# Patient Record
Sex: MALE | Race: BLACK OR AFRICAN AMERICAN | NOT HISPANIC OR LATINO | Employment: OTHER | URBAN - METROPOLITAN AREA
[De-identification: names, ages, dates, MRNs, and addresses within clinical notes are randomized per-mention and may not be internally consistent; named-entity substitution may affect disease eponyms.]

---

## 2023-11-25 ENCOUNTER — HOSPITAL ENCOUNTER (INPATIENT)
Facility: HOSPITAL | Age: 48
LOS: 3 days | Discharge: HOME | DRG: 177 | End: 2023-11-28
Attending: EMERGENCY MEDICINE | Admitting: STUDENT IN AN ORGANIZED HEALTH CARE EDUCATION/TRAINING PROGRAM
Payer: MEDICARE

## 2023-11-25 ENCOUNTER — APPOINTMENT (OUTPATIENT)
Dept: RADIOLOGY | Facility: HOSPITAL | Age: 48
DRG: 177 | End: 2023-11-25
Payer: MEDICARE

## 2023-11-25 ENCOUNTER — DOCUMENTATION (OUTPATIENT)
Dept: INTENSIVE CARE | Facility: HOSPITAL | Age: 48
End: 2023-11-25

## 2023-11-25 DIAGNOSIS — I50.33 ACUTE ON CHRONIC DIASTOLIC CONGESTIVE HEART FAILURE (MULTI): ICD-10-CM

## 2023-11-25 DIAGNOSIS — U07.1 COVID: Primary | ICD-10-CM

## 2023-11-25 DIAGNOSIS — J44.9 CHRONIC OBSTRUCTIVE PULMONARY DISEASE, UNSPECIFIED COPD TYPE (MULTI): ICD-10-CM

## 2023-11-25 PROBLEM — J45.909 UNSPECIFIED ASTHMA, UNCOMPLICATED (HHS-HCC): Status: ACTIVE | Noted: 2023-07-19

## 2023-11-25 PROBLEM — I50.9 CONGESTIVE HEART FAILURE (MULTI): Status: ACTIVE | Noted: 2023-07-12

## 2023-11-25 LAB
ANION GAP BLDV CALCULATED.4IONS-SCNC: -1 MMOL/L (ref 10–25)
ANION GAP BLDV CALCULATED.4IONS-SCNC: 2 MMOL/L (ref 10–25)
ANION GAP BLDV CALCULATED.4IONS-SCNC: 3 MMOL/L (ref 10–25)
ANION GAP BLDV CALCULATED.4IONS-SCNC: 5 MMOL/L (ref 10–25)
ANION GAP SERPL CALC-SCNC: 12 MMOL/L (ref 10–20)
BASE EXCESS BLDV CALC-SCNC: 10.1 MMOL/L (ref -2–3)
BASE EXCESS BLDV CALC-SCNC: 7.5 MMOL/L (ref -2–3)
BASE EXCESS BLDV CALC-SCNC: 9.2 MMOL/L (ref -2–3)
BASE EXCESS BLDV CALC-SCNC: 9.7 MMOL/L (ref -2–3)
BASOPHILS # BLD AUTO: 0.03 X10*3/UL (ref 0–0.1)
BASOPHILS NFR BLD AUTO: 0.5 %
BNP SERPL-MCNC: 59 PG/ML (ref 0–99)
BODY TEMPERATURE: 37 DEGREES CELSIUS
BUN SERPL-MCNC: 17 MG/DL (ref 6–23)
CA-I BLDV-SCNC: 1.02 MMOL/L (ref 1.1–1.33)
CA-I BLDV-SCNC: 1.05 MMOL/L (ref 1.1–1.33)
CA-I BLDV-SCNC: 1.07 MMOL/L (ref 1.1–1.33)
CA-I BLDV-SCNC: 1.11 MMOL/L (ref 1.1–1.33)
CALCIUM SERPL-MCNC: 8.4 MG/DL (ref 8.6–10.6)
CARDIAC TROPONIN I PNL SERPL HS: 39 NG/L (ref 0–53)
CARDIAC TROPONIN I PNL SERPL HS: 47 NG/L (ref 0–53)
CHLORIDE BLDV-SCNC: 95 MMOL/L (ref 98–107)
CHLORIDE BLDV-SCNC: 95 MMOL/L (ref 98–107)
CHLORIDE BLDV-SCNC: 96 MMOL/L (ref 98–107)
CHLORIDE BLDV-SCNC: 97 MMOL/L (ref 98–107)
CHLORIDE SERPL-SCNC: 96 MMOL/L (ref 98–107)
CO2 SERPL-SCNC: 35 MMOL/L (ref 21–32)
CREAT SERPL-MCNC: 1.15 MG/DL (ref 0.5–1.3)
EOSINOPHIL # BLD AUTO: 0.14 X10*3/UL (ref 0–0.7)
EOSINOPHIL NFR BLD AUTO: 2.4 %
ERYTHROCYTE [DISTWIDTH] IN BLOOD BY AUTOMATED COUNT: 12.6 % (ref 11.5–14.5)
FLUAV RNA RESP QL NAA+PROBE: NOT DETECTED
FLUBV RNA RESP QL NAA+PROBE: NOT DETECTED
GFR SERPL CREATININE-BSD FRML MDRD: 79 ML/MIN/1.73M*2
GLUCOSE BLDV-MCNC: 192 MG/DL (ref 74–99)
GLUCOSE BLDV-MCNC: 205 MG/DL (ref 74–99)
GLUCOSE BLDV-MCNC: 219 MG/DL (ref 74–99)
GLUCOSE BLDV-MCNC: 231 MG/DL (ref 74–99)
GLUCOSE SERPL-MCNC: 183 MG/DL (ref 74–99)
HCO3 BLDV-SCNC: 39.5 MMOL/L (ref 22–26)
HCO3 BLDV-SCNC: 40.1 MMOL/L (ref 22–26)
HCO3 BLDV-SCNC: 41.3 MMOL/L (ref 22–26)
HCO3 BLDV-SCNC: 42.5 MMOL/L (ref 22–26)
HCT VFR BLD AUTO: 51.5 % (ref 41–52)
HCT VFR BLD EST: 50 % (ref 41–52)
HCT VFR BLD EST: 51 % (ref 41–52)
HCT VFR BLD EST: 53 % (ref 41–52)
HCT VFR BLD EST: 53 % (ref 41–52)
HGB BLD-MCNC: 16.2 G/DL (ref 13.5–17.5)
HGB BLDV-MCNC: 16.6 G/DL (ref 13.5–17.5)
HGB BLDV-MCNC: 17.1 G/DL (ref 13.5–17.5)
HGB BLDV-MCNC: 17.5 G/DL (ref 13.5–17.5)
HGB BLDV-MCNC: 17.8 G/DL (ref 13.5–17.5)
HOLD SPECIMEN: NORMAL
HOLD SPECIMEN: NORMAL
IMM GRANULOCYTES # BLD AUTO: 0.03 X10*3/UL (ref 0–0.7)
IMM GRANULOCYTES NFR BLD AUTO: 0.5 % (ref 0–0.9)
INHALED O2 CONCENTRATION: 100 %
LACTATE BLDV-SCNC: 0.9 MMOL/L (ref 0.4–2)
LACTATE BLDV-SCNC: 0.9 MMOL/L (ref 0.4–2)
LACTATE BLDV-SCNC: 1.2 MMOL/L (ref 0.4–2)
LACTATE BLDV-SCNC: 1.4 MMOL/L (ref 0.4–2)
LYMPHOCYTES # BLD AUTO: 1.93 X10*3/UL (ref 1.2–4.8)
LYMPHOCYTES NFR BLD AUTO: 33.2 %
MAGNESIUM SERPL-MCNC: 1.67 MG/DL (ref 1.6–2.4)
MCH RBC QN AUTO: 31.6 PG (ref 26–34)
MCHC RBC AUTO-ENTMCNC: 31.5 G/DL (ref 32–36)
MCV RBC AUTO: 101 FL (ref 80–100)
MONOCYTES # BLD AUTO: 0.56 X10*3/UL (ref 0.1–1)
MONOCYTES NFR BLD AUTO: 9.6 %
NEUTROPHILS # BLD AUTO: 3.13 X10*3/UL (ref 1.2–7.7)
NEUTROPHILS NFR BLD AUTO: 53.8 %
NRBC BLD-RTO: 0.9 /100 WBCS (ref 0–0)
OXYHGB MFR BLDV: 76 % (ref 45–75)
OXYHGB MFR BLDV: 81.4 % (ref 45–75)
OXYHGB MFR BLDV: 92.9 % (ref 45–75)
OXYHGB MFR BLDV: 96 % (ref 45–75)
PCO2 BLDV: 76 MM HG (ref 41–51)
PCO2 BLDV: 90 MM HG (ref 41–51)
PCO2 BLDV: 90 MM HG (ref 41–51)
PCO2 BLDV: 97 MM HG (ref 41–51)
PH BLDV: 7.25 PH (ref 7.33–7.43)
PH BLDV: 7.25 PH (ref 7.33–7.43)
PH BLDV: 7.27 PH (ref 7.33–7.43)
PH BLDV: 7.33 PH (ref 7.33–7.43)
PLATELET # BLD AUTO: 239 X10*3/UL (ref 150–450)
PO2 BLDV: 122 MM HG (ref 35–45)
PO2 BLDV: 54 MM HG (ref 35–45)
PO2 BLDV: 63 MM HG (ref 35–45)
PO2 BLDV: 89 MM HG (ref 35–45)
POTASSIUM BLDV-SCNC: 4.3 MMOL/L (ref 3.5–5.3)
POTASSIUM BLDV-SCNC: 4.3 MMOL/L (ref 3.5–5.3)
POTASSIUM BLDV-SCNC: 4.6 MMOL/L (ref 3.5–5.3)
POTASSIUM BLDV-SCNC: 4.7 MMOL/L (ref 3.5–5.3)
POTASSIUM SERPL-SCNC: 3.9 MMOL/L (ref 3.5–5.3)
RBC # BLD AUTO: 5.12 X10*6/UL (ref 4.5–5.9)
SAO2 % BLDV: 79 % (ref 45–75)
SAO2 % BLDV: 84 % (ref 45–75)
SAO2 % BLDV: 96 % (ref 45–75)
SAO2 % BLDV: 99 % (ref 45–75)
SARS-COV-2 RNA RESP QL NAA+PROBE: DETECTED
SODIUM BLDV-SCNC: 133 MMOL/L (ref 136–145)
SODIUM BLDV-SCNC: 134 MMOL/L (ref 136–145)
SODIUM BLDV-SCNC: 135 MMOL/L (ref 136–145)
SODIUM BLDV-SCNC: 136 MMOL/L (ref 136–145)
SODIUM SERPL-SCNC: 139 MMOL/L (ref 136–145)
WBC # BLD AUTO: 5.8 X10*3/UL (ref 4.4–11.3)

## 2023-11-25 PROCEDURE — 99223 1ST HOSP IP/OBS HIGH 75: CPT

## 2023-11-25 PROCEDURE — 84132 ASSAY OF SERUM POTASSIUM: CPT

## 2023-11-25 PROCEDURE — 85025 COMPLETE CBC W/AUTO DIFF WBC: CPT | Performed by: EMERGENCY MEDICINE

## 2023-11-25 PROCEDURE — 94762 N-INVAS EAR/PLS OXIMTRY CONT: CPT

## 2023-11-25 PROCEDURE — 84484 ASSAY OF TROPONIN QUANT: CPT | Performed by: EMERGENCY MEDICINE

## 2023-11-25 PROCEDURE — 83735 ASSAY OF MAGNESIUM: CPT | Performed by: EMERGENCY MEDICINE

## 2023-11-25 PROCEDURE — 80048 BASIC METABOLIC PNL TOTAL CA: CPT | Performed by: EMERGENCY MEDICINE

## 2023-11-25 PROCEDURE — 71045 X-RAY EXAM CHEST 1 VIEW: CPT | Mod: FY

## 2023-11-25 PROCEDURE — 99285 EMERGENCY DEPT VISIT HI MDM: CPT | Performed by: EMERGENCY MEDICINE

## 2023-11-25 PROCEDURE — 93010 ELECTROCARDIOGRAM REPORT: CPT | Performed by: EMERGENCY MEDICINE

## 2023-11-25 PROCEDURE — 36415 COLL VENOUS BLD VENIPUNCTURE: CPT | Performed by: EMERGENCY MEDICINE

## 2023-11-25 PROCEDURE — 5A09357 ASSISTANCE WITH RESPIRATORY VENTILATION, LESS THAN 24 CONSECUTIVE HOURS, CONTINUOUS POSITIVE AIRWAY PRESSURE: ICD-10-PCS | Performed by: INTERNAL MEDICINE

## 2023-11-25 PROCEDURE — 87636 SARSCOV2 & INF A&B AMP PRB: CPT | Performed by: EMERGENCY MEDICINE

## 2023-11-25 PROCEDURE — 94660 CPAP INITIATION&MGMT: CPT

## 2023-11-25 PROCEDURE — 2020000001 HC ICU ROOM DAILY

## 2023-11-25 PROCEDURE — 2500000005 HC RX 250 GENERAL PHARMACY W/O HCPCS: Mod: SE | Performed by: EMERGENCY MEDICINE

## 2023-11-25 PROCEDURE — 36415 COLL VENOUS BLD VENIPUNCTURE: CPT | Performed by: STUDENT IN AN ORGANIZED HEALTH CARE EDUCATION/TRAINING PROGRAM

## 2023-11-25 PROCEDURE — 71045 X-RAY EXAM CHEST 1 VIEW: CPT | Performed by: STUDENT IN AN ORGANIZED HEALTH CARE EDUCATION/TRAINING PROGRAM

## 2023-11-25 PROCEDURE — 84132 ASSAY OF SERUM POTASSIUM: CPT | Performed by: EMERGENCY MEDICINE

## 2023-11-25 PROCEDURE — 83880 ASSAY OF NATRIURETIC PEPTIDE: CPT | Performed by: EMERGENCY MEDICINE

## 2023-11-25 PROCEDURE — 99291 CRITICAL CARE FIRST HOUR: CPT

## 2023-11-25 RX ORDER — IPRATROPIUM BROMIDE AND ALBUTEROL SULFATE 2.5; .5 MG/3ML; MG/3ML
SOLUTION RESPIRATORY (INHALATION)
Status: DISPENSED
Start: 2023-11-25 | End: 2023-11-26

## 2023-11-25 RX ORDER — FUROSEMIDE 10 MG/ML
INJECTION INTRAMUSCULAR; INTRAVENOUS
Status: DISPENSED
Start: 2023-11-25 | End: 2023-11-26

## 2023-11-25 RX ADMIN — Medication 60 PERCENT: at 19:15

## 2023-11-25 ASSESSMENT — COLUMBIA-SUICIDE SEVERITY RATING SCALE - C-SSRS
1. IN THE PAST MONTH, HAVE YOU WISHED YOU WERE DEAD OR WISHED YOU COULD GO TO SLEEP AND NOT WAKE UP?: NO
6. HAVE YOU EVER DONE ANYTHING, STARTED TO DO ANYTHING, OR PREPARED TO DO ANYTHING TO END YOUR LIFE?: NO
2. HAVE YOU ACTUALLY HAD ANY THOUGHTS OF KILLING YOURSELF?: NO

## 2023-11-26 ENCOUNTER — APPOINTMENT (OUTPATIENT)
Dept: CARDIOLOGY | Facility: HOSPITAL | Age: 48
DRG: 177 | End: 2023-11-26
Payer: MEDICARE

## 2023-11-26 LAB
ALBUMIN SERPL BCP-MCNC: 3.7 G/DL (ref 3.4–5)
ALBUMIN SERPL BCP-MCNC: 3.7 G/DL (ref 3.4–5)
ALP SERPL-CCNC: 67 U/L (ref 33–120)
ALT SERPL W P-5'-P-CCNC: 17 U/L (ref 10–52)
ANION GAP BLDV CALCULATED.4IONS-SCNC: 0 MMOL/L (ref 10–25)
ANION GAP BLDV CALCULATED.4IONS-SCNC: 2 MMOL/L (ref 10–25)
ANION GAP BLDV CALCULATED.4IONS-SCNC: 3 MMOL/L (ref 10–25)
ANION GAP BLDV CALCULATED.4IONS-SCNC: 3 MMOL/L (ref 10–25)
ANION GAP BLDV CALCULATED.4IONS-SCNC: 6 MMOL/L (ref 10–25)
ANION GAP SERPL CALC-SCNC: 15 MMOL/L (ref 10–20)
ANION GAP SERPL CALC-SCNC: 15 MMOL/L (ref 10–20)
AST SERPL W P-5'-P-CCNC: 12 U/L (ref 9–39)
ATRIAL RATE: 106 BPM
BASE EXCESS BLDV CALC-SCNC: 11.8 MMOL/L (ref -2–3)
BASE EXCESS BLDV CALC-SCNC: 7.3 MMOL/L (ref -2–3)
BASE EXCESS BLDV CALC-SCNC: 7.3 MMOL/L (ref -2–3)
BASE EXCESS BLDV CALC-SCNC: 9 MMOL/L (ref -2–3)
BASE EXCESS BLDV CALC-SCNC: 9.3 MMOL/L (ref -2–3)
BASOPHILS # BLD AUTO: 0.02 X10*3/UL (ref 0–0.1)
BASOPHILS NFR BLD AUTO: 0.4 %
BILIRUB DIRECT SERPL-MCNC: 0.1 MG/DL (ref 0–0.3)
BILIRUB SERPL-MCNC: 0.5 MG/DL (ref 0–1.2)
BODY TEMPERATURE: 37 DEGREES CELSIUS
BUN SERPL-MCNC: 21 MG/DL (ref 6–23)
BUN SERPL-MCNC: 26 MG/DL (ref 6–23)
CA-I BLDV-SCNC: 1.04 MMOL/L (ref 1.1–1.33)
CA-I BLDV-SCNC: 1.06 MMOL/L (ref 1.1–1.33)
CA-I BLDV-SCNC: 1.07 MMOL/L (ref 1.1–1.33)
CA-I BLDV-SCNC: 1.08 MMOL/L (ref 1.1–1.33)
CA-I BLDV-SCNC: 1.08 MMOL/L (ref 1.1–1.33)
CALCIUM SERPL-MCNC: 8.3 MG/DL (ref 8.6–10.6)
CALCIUM SERPL-MCNC: 8.5 MG/DL (ref 8.6–10.6)
CHLORIDE BLDV-SCNC: 94 MMOL/L (ref 98–107)
CHLORIDE BLDV-SCNC: 95 MMOL/L (ref 98–107)
CHLORIDE BLDV-SCNC: 96 MMOL/L (ref 98–107)
CHLORIDE SERPL-SCNC: 95 MMOL/L (ref 98–107)
CHLORIDE SERPL-SCNC: 96 MMOL/L (ref 98–107)
CO2 SERPL-SCNC: 32 MMOL/L (ref 21–32)
CO2 SERPL-SCNC: 34 MMOL/L (ref 21–32)
CREAT SERPL-MCNC: 1.19 MG/DL (ref 0.5–1.3)
CREAT SERPL-MCNC: 1.2 MG/DL (ref 0.5–1.3)
EOSINOPHIL # BLD AUTO: 0 X10*3/UL (ref 0–0.7)
EOSINOPHIL NFR BLD AUTO: 0 %
ERYTHROCYTE [DISTWIDTH] IN BLOOD BY AUTOMATED COUNT: 12.6 % (ref 11.5–14.5)
GFR SERPL CREATININE-BSD FRML MDRD: 75 ML/MIN/1.73M*2
GFR SERPL CREATININE-BSD FRML MDRD: 75 ML/MIN/1.73M*2
GLUCOSE BLD MANUAL STRIP-MCNC: 163 MG/DL (ref 74–99)
GLUCOSE BLD MANUAL STRIP-MCNC: 180 MG/DL (ref 74–99)
GLUCOSE BLD MANUAL STRIP-MCNC: 209 MG/DL (ref 74–99)
GLUCOSE BLD MANUAL STRIP-MCNC: 269 MG/DL (ref 74–99)
GLUCOSE BLDV-MCNC: 214 MG/DL (ref 74–99)
GLUCOSE BLDV-MCNC: 215 MG/DL (ref 74–99)
GLUCOSE BLDV-MCNC: 228 MG/DL (ref 74–99)
GLUCOSE BLDV-MCNC: 236 MG/DL (ref 74–99)
GLUCOSE BLDV-MCNC: 281 MG/DL (ref 74–99)
GLUCOSE SERPL-MCNC: 215 MG/DL (ref 74–99)
GLUCOSE SERPL-MCNC: 259 MG/DL (ref 74–99)
HCO3 BLDV-SCNC: 37.1 MMOL/L (ref 22–26)
HCO3 BLDV-SCNC: 39 MMOL/L (ref 22–26)
HCO3 BLDV-SCNC: 39.9 MMOL/L (ref 22–26)
HCO3 BLDV-SCNC: 40.3 MMOL/L (ref 22–26)
HCO3 BLDV-SCNC: 42.1 MMOL/L (ref 22–26)
HCT VFR BLD AUTO: 54.1 % (ref 41–52)
HCT VFR BLD EST: 50 % (ref 41–52)
HCT VFR BLD EST: 51 % (ref 41–52)
HCT VFR BLD EST: 52 % (ref 41–52)
HGB BLD-MCNC: 16.5 G/DL (ref 13.5–17.5)
HGB BLDV-MCNC: 16.6 G/DL (ref 13.5–17.5)
HGB BLDV-MCNC: 17.1 G/DL (ref 13.5–17.5)
HGB BLDV-MCNC: 17.2 G/DL (ref 13.5–17.5)
HGB BLDV-MCNC: 17.3 G/DL (ref 13.5–17.5)
HGB BLDV-MCNC: 17.4 G/DL (ref 13.5–17.5)
IMM GRANULOCYTES # BLD AUTO: 0.05 X10*3/UL (ref 0–0.7)
IMM GRANULOCYTES NFR BLD AUTO: 1 % (ref 0–0.9)
INHALED O2 CONCENTRATION: 100 %
INHALED O2 CONCENTRATION: 21 %
INHALED O2 CONCENTRATION: 60 %
INHALED O2 CONCENTRATION: 60 %
LACTATE BLDV-SCNC: 0.9 MMOL/L (ref 0.4–2)
LACTATE BLDV-SCNC: 1.1 MMOL/L (ref 0.4–2)
LACTATE BLDV-SCNC: 1.2 MMOL/L (ref 0.4–2)
LYMPHOCYTES # BLD AUTO: 1.44 X10*3/UL (ref 1.2–4.8)
LYMPHOCYTES NFR BLD AUTO: 28.3 %
MAGNESIUM SERPL-MCNC: 1.94 MG/DL (ref 1.6–2.4)
MAGNESIUM SERPL-MCNC: 1.98 MG/DL (ref 1.6–2.4)
MCH RBC QN AUTO: 31.3 PG (ref 26–34)
MCHC RBC AUTO-ENTMCNC: 30.5 G/DL (ref 32–36)
MCV RBC AUTO: 103 FL (ref 80–100)
MONOCYTES # BLD AUTO: 0.21 X10*3/UL (ref 0.1–1)
MONOCYTES NFR BLD AUTO: 4.1 %
NEUTROPHILS # BLD AUTO: 3.37 X10*3/UL (ref 1.2–7.7)
NEUTROPHILS NFR BLD AUTO: 66.2 %
NRBC BLD-RTO: 1.4 /100 WBCS (ref 0–0)
OXYHGB MFR BLDV: 78.2 % (ref 45–75)
OXYHGB MFR BLDV: 83 % (ref 45–75)
OXYHGB MFR BLDV: 91 % (ref 45–75)
OXYHGB MFR BLDV: 92.3 % (ref 45–75)
OXYHGB MFR BLDV: 93.6 % (ref 45–75)
P AXIS: 58 DEGREES
P OFFSET: 202 MS
P ONSET: 147 MS
PCO2 BLDV: 72 MM HG (ref 41–51)
PCO2 BLDV: 78 MM HG (ref 41–51)
PCO2 BLDV: 81 MM HG (ref 41–51)
PCO2 BLDV: 82 MM HG (ref 41–51)
PCO2 BLDV: 87 MM HG (ref 41–51)
PH BLDV: 7.26 PH (ref 7.33–7.43)
PH BLDV: 7.3 PH (ref 7.33–7.43)
PH BLDV: 7.3 PH (ref 7.33–7.43)
PH BLDV: 7.32 PH (ref 7.33–7.43)
PH BLDV: 7.34 PH (ref 7.33–7.43)
PHOSPHATE SERPL-MCNC: 4.2 MG/DL (ref 2.5–4.9)
PHOSPHATE SERPL-MCNC: 5.9 MG/DL (ref 2.5–4.9)
PLATELET # BLD AUTO: 216 X10*3/UL (ref 150–450)
PO2 BLDV: 57 MM HG (ref 35–45)
PO2 BLDV: 62 MM HG (ref 35–45)
PO2 BLDV: 75 MM HG (ref 35–45)
PO2 BLDV: 88 MM HG (ref 35–45)
PO2 BLDV: 89 MM HG (ref 35–45)
POTASSIUM BLDV-SCNC: 4.4 MMOL/L (ref 3.5–5.3)
POTASSIUM BLDV-SCNC: 4.5 MMOL/L (ref 3.5–5.3)
POTASSIUM BLDV-SCNC: 4.6 MMOL/L (ref 3.5–5.3)
POTASSIUM BLDV-SCNC: 4.7 MMOL/L (ref 3.5–5.3)
POTASSIUM BLDV-SCNC: 5.2 MMOL/L (ref 3.5–5.3)
POTASSIUM SERPL-SCNC: 4.6 MMOL/L (ref 3.5–5.3)
POTASSIUM SERPL-SCNC: 5.2 MMOL/L (ref 3.5–5.3)
PR INTERVAL: 134 MS
PROT SERPL-MCNC: 6.8 G/DL (ref 6.4–8.2)
Q ONSET: 214 MS
QRS COUNT: 17 BEATS
QRS DURATION: 82 MS
QT INTERVAL: 316 MS
QTC CALCULATION(BAZETT): 419 MS
QTC FREDERICIA: 381 MS
R AXIS: 5 DEGREES
RBC # BLD AUTO: 5.28 X10*6/UL (ref 4.5–5.9)
SAO2 % BLDV: 85 % (ref 45–75)
SAO2 % BLDV: 86 % (ref 45–75)
SAO2 % BLDV: 94 % (ref 45–75)
SAO2 % BLDV: 96 % (ref 45–75)
SAO2 % BLDV: 97 % (ref 45–75)
SODIUM BLDV-SCNC: 131 MMOL/L (ref 136–145)
SODIUM BLDV-SCNC: 133 MMOL/L (ref 136–145)
SODIUM BLDV-SCNC: 133 MMOL/L (ref 136–145)
SODIUM BLDV-SCNC: 134 MMOL/L (ref 136–145)
SODIUM BLDV-SCNC: 134 MMOL/L (ref 136–145)
SODIUM SERPL-SCNC: 137 MMOL/L (ref 136–145)
SODIUM SERPL-SCNC: 140 MMOL/L (ref 136–145)
T AXIS: 68 DEGREES
T OFFSET: 372 MS
VENTRICULAR RATE: 106 BPM
WBC # BLD AUTO: 5.1 X10*3/UL (ref 4.4–11.3)

## 2023-11-26 PROCEDURE — 94760 N-INVAS EAR/PLS OXIMETRY 1: CPT

## 2023-11-26 PROCEDURE — 83735 ASSAY OF MAGNESIUM: CPT

## 2023-11-26 PROCEDURE — 84132 ASSAY OF SERUM POTASSIUM: CPT

## 2023-11-26 PROCEDURE — 36415 COLL VENOUS BLD VENIPUNCTURE: CPT

## 2023-11-26 PROCEDURE — 3E0D73Z INTRODUCTION OF ANTI-INFLAMMATORY INTO MOUTH AND PHARYNX, VIA NATURAL OR ARTIFICIAL OPENING: ICD-10-PCS

## 2023-11-26 PROCEDURE — 96372 THER/PROPH/DIAG INJ SC/IM: CPT

## 2023-11-26 PROCEDURE — 84100 ASSAY OF PHOSPHORUS: CPT

## 2023-11-26 PROCEDURE — 82248 BILIRUBIN DIRECT: CPT

## 2023-11-26 PROCEDURE — 99291 CRITICAL CARE FIRST HOUR: CPT

## 2023-11-26 PROCEDURE — XW033E5 INTRODUCTION OF REMDESIVIR ANTI-INFECTIVE INTO PERIPHERAL VEIN, PERCUTANEOUS APPROACH, NEW TECHNOLOGY GROUP 5: ICD-10-PCS

## 2023-11-26 PROCEDURE — 2500000001 HC RX 250 WO HCPCS SELF ADMINISTERED DRUGS (ALT 637 FOR MEDICARE OP)

## 2023-11-26 PROCEDURE — 85025 COMPLETE CBC W/AUTO DIFF WBC: CPT

## 2023-11-26 PROCEDURE — 1200000002 HC GENERAL ROOM WITH TELEMETRY DAILY

## 2023-11-26 PROCEDURE — 82947 ASSAY GLUCOSE BLOOD QUANT: CPT

## 2023-11-26 PROCEDURE — 94799 UNLISTED PULMONARY SVC/PX: CPT

## 2023-11-26 PROCEDURE — 94640 AIRWAY INHALATION TREATMENT: CPT

## 2023-11-26 PROCEDURE — 82330 ASSAY OF CALCIUM: CPT

## 2023-11-26 PROCEDURE — 2500000002 HC RX 250 W HCPCS SELF ADMINISTERED DRUGS (ALT 637 FOR MEDICARE OP, ALT 636 FOR OP/ED)

## 2023-11-26 PROCEDURE — 2500000004 HC RX 250 GENERAL PHARMACY W/ HCPCS (ALT 636 FOR OP/ED)

## 2023-11-26 PROCEDURE — 2500000005 HC RX 250 GENERAL PHARMACY W/O HCPCS

## 2023-11-26 PROCEDURE — 94660 CPAP INITIATION&MGMT: CPT

## 2023-11-26 PROCEDURE — 93005 ELECTROCARDIOGRAM TRACING: CPT

## 2023-11-26 RX ORDER — ALBUTEROL SULFATE 90 UG/1
2 AEROSOL, METERED RESPIRATORY (INHALATION) EVERY 4 HOURS PRN
COMMUNITY
Start: 2023-11-06

## 2023-11-26 RX ORDER — DEXAMETHASONE 6 MG/1
6 TABLET ORAL DAILY
Status: DISCONTINUED | OUTPATIENT
Start: 2023-11-26 | End: 2023-11-27

## 2023-11-26 RX ORDER — INSULIN LISPRO 100 [IU]/ML
0-15 INJECTION, SOLUTION INTRAVENOUS; SUBCUTANEOUS
Status: DISCONTINUED | OUTPATIENT
Start: 2023-11-26 | End: 2023-11-29 | Stop reason: HOSPADM

## 2023-11-26 RX ORDER — SENNOSIDES 8.6 MG/1
1 TABLET ORAL
COMMUNITY
Start: 2023-07-26

## 2023-11-26 RX ORDER — CARBAMAZEPINE 100 MG/1
100 TABLET, CHEWABLE ORAL 2 TIMES DAILY
COMMUNITY
Start: 2023-11-06 | End: 2024-02-04

## 2023-11-26 RX ORDER — POLYETHYLENE GLYCOL 3350 17 G/17G
17 POWDER, FOR SOLUTION ORAL
COMMUNITY
Start: 2023-07-26

## 2023-11-26 RX ORDER — DICLOFENAC SODIUM 10 MG/G
2 GEL TOPICAL 4 TIMES DAILY
COMMUNITY
Start: 2023-10-17

## 2023-11-26 RX ORDER — PETROLATUM 420 MG/G
OINTMENT TOPICAL
Status: COMPLETED
Start: 2023-11-26 | End: 2023-11-26

## 2023-11-26 RX ORDER — PANTOPRAZOLE SODIUM 40 MG/1
40 TABLET, DELAYED RELEASE ORAL
Status: DISCONTINUED | OUTPATIENT
Start: 2023-11-26 | End: 2023-11-29 | Stop reason: HOSPADM

## 2023-11-26 RX ORDER — FUROSEMIDE 10 MG/ML
60 INJECTION INTRAMUSCULAR; INTRAVENOUS ONCE
Status: COMPLETED | OUTPATIENT
Start: 2023-11-26 | End: 2023-11-26

## 2023-11-26 RX ORDER — ATORVASTATIN CALCIUM 20 MG/1
20 TABLET, FILM COATED ORAL NIGHTLY
Status: DISCONTINUED | OUTPATIENT
Start: 2023-11-26 | End: 2023-11-29 | Stop reason: HOSPADM

## 2023-11-26 RX ORDER — ASPIRIN 81 MG/1
81 TABLET ORAL
COMMUNITY
Start: 2023-11-06 | End: 2024-05-04

## 2023-11-26 RX ORDER — INSULIN LISPRO 100 [IU]/ML
0-5 INJECTION, SOLUTION INTRAVENOUS; SUBCUTANEOUS
Status: DISCONTINUED | OUTPATIENT
Start: 2023-11-26 | End: 2023-11-26

## 2023-11-26 RX ORDER — IPRATROPIUM BROMIDE AND ALBUTEROL SULFATE 2.5; .5 MG/3ML; MG/3ML
3 SOLUTION RESPIRATORY (INHALATION)
Status: DISCONTINUED | OUTPATIENT
Start: 2023-11-26 | End: 2023-11-26

## 2023-11-26 RX ORDER — FUROSEMIDE 20 MG/1
20 TABLET ORAL
Status: ON HOLD | COMMUNITY
Start: 2023-11-06 | End: 2023-11-28 | Stop reason: SDUPTHER

## 2023-11-26 RX ORDER — HYDROCHLOROTHIAZIDE 25 MG/1
50 TABLET ORAL DAILY
Status: DISCONTINUED | OUTPATIENT
Start: 2023-11-26 | End: 2023-11-29 | Stop reason: HOSPADM

## 2023-11-26 RX ORDER — NAPROXEN SODIUM 220 MG/1
81 TABLET, FILM COATED ORAL DAILY
Status: DISCONTINUED | OUTPATIENT
Start: 2023-11-26 | End: 2023-11-29 | Stop reason: HOSPADM

## 2023-11-26 RX ORDER — MAGNESIUM SULFATE HEPTAHYDRATE 40 MG/ML
2 INJECTION, SOLUTION INTRAVENOUS ONCE
Status: COMPLETED | OUTPATIENT
Start: 2023-11-26 | End: 2023-11-26

## 2023-11-26 RX ORDER — METFORMIN HYDROCHLORIDE 1000 MG/1
1 TABLET ORAL
COMMUNITY
Start: 2023-11-06 | End: 2024-05-04

## 2023-11-26 RX ORDER — ERGOCALCIFEROL 1.25 MG/1
50000 CAPSULE ORAL WEEKLY
COMMUNITY
Start: 2023-11-01

## 2023-11-26 RX ORDER — LOSARTAN POTASSIUM 50 MG/1
50 TABLET ORAL DAILY
Status: DISCONTINUED | OUTPATIENT
Start: 2023-11-26 | End: 2023-11-29 | Stop reason: HOSPADM

## 2023-11-26 RX ORDER — ATORVASTATIN CALCIUM 20 MG/1
20 TABLET, FILM COATED ORAL
COMMUNITY
Start: 2023-11-06

## 2023-11-26 RX ORDER — AMLODIPINE BESYLATE 10 MG/1
10 TABLET ORAL DAILY
Status: DISCONTINUED | OUTPATIENT
Start: 2023-11-26 | End: 2023-11-29 | Stop reason: HOSPADM

## 2023-11-26 RX ORDER — ALBUTEROL SULFATE 90 UG/1
2 AEROSOL, METERED RESPIRATORY (INHALATION) 4 TIMES DAILY PRN
Status: DISCONTINUED | OUTPATIENT
Start: 2023-11-26 | End: 2023-11-29 | Stop reason: HOSPADM

## 2023-11-26 RX ORDER — AMLODIPINE BESYLATE 10 MG/1
10 TABLET ORAL
COMMUNITY
Start: 2023-11-06 | End: 2024-05-04

## 2023-11-26 RX ORDER — DEXTROSE 50 % IN WATER (D50W) INTRAVENOUS SYRINGE
25
Status: DISCONTINUED | OUTPATIENT
Start: 2023-11-26 | End: 2023-11-29 | Stop reason: HOSPADM

## 2023-11-26 RX ORDER — LOSARTAN POTASSIUM 50 MG/1
50 TABLET ORAL 2 TIMES DAILY
COMMUNITY
Start: 2023-11-06 | End: 2024-05-04

## 2023-11-26 RX ORDER — ENOXAPARIN SODIUM 100 MG/ML
60 INJECTION SUBCUTANEOUS EVERY 12 HOURS SCHEDULED
Status: DISCONTINUED | OUTPATIENT
Start: 2023-11-26 | End: 2023-11-29 | Stop reason: HOSPADM

## 2023-11-26 RX ORDER — DEXTROSE MONOHYDRATE 100 MG/ML
0.3 INJECTION, SOLUTION INTRAVENOUS ONCE AS NEEDED
Status: DISCONTINUED | OUTPATIENT
Start: 2023-11-26 | End: 2023-11-29 | Stop reason: HOSPADM

## 2023-11-26 RX ADMIN — LOSARTAN POTASSIUM 50 MG: 50 TABLET, FILM COATED ORAL at 11:34

## 2023-11-26 RX ADMIN — INSULIN LISPRO 1 UNITS: 100 INJECTION, SOLUTION INTRAVENOUS; SUBCUTANEOUS at 11:39

## 2023-11-26 RX ADMIN — INSULIN LISPRO 1 UNITS: 100 INJECTION, SOLUTION INTRAVENOUS; SUBCUTANEOUS at 09:04

## 2023-11-26 RX ADMIN — HYDROCHLOROTHIAZIDE 50 MG: 25 TABLET ORAL at 11:34

## 2023-11-26 RX ADMIN — IPRATROPIUM BROMIDE AND ALBUTEROL SULFATE 3 ML: .5; 3 SOLUTION RESPIRATORY (INHALATION) at 03:52

## 2023-11-26 RX ADMIN — DEXAMETHASONE 6 MG: 6 TABLET ORAL at 11:34

## 2023-11-26 RX ADMIN — FUROSEMIDE 60 MG: 10 INJECTION, SOLUTION INTRAVENOUS at 15:02

## 2023-11-26 RX ADMIN — ATORVASTATIN CALCIUM 20 MG: 20 TABLET, FILM COATED ORAL at 20:08

## 2023-11-26 RX ADMIN — INSULIN LISPRO 3 UNITS: 100 INJECTION, SOLUTION INTRAVENOUS; SUBCUTANEOUS at 16:44

## 2023-11-26 RX ADMIN — ENOXAPARIN SODIUM 60 MG: 100 INJECTION SUBCUTANEOUS at 14:03

## 2023-11-26 RX ADMIN — AMLODIPINE BESYLATE 10 MG: 10 TABLET ORAL at 08:36

## 2023-11-26 RX ADMIN — PANTOPRAZOLE SODIUM 40 MG: 40 TABLET, DELAYED RELEASE ORAL at 08:35

## 2023-11-26 RX ADMIN — MAGNESIUM SULFATE HEPTAHYDRATE 2 G: 40 INJECTION, SOLUTION INTRAVENOUS at 01:22

## 2023-11-26 RX ADMIN — ENOXAPARIN SODIUM 60 MG: 100 INJECTION SUBCUTANEOUS at 02:37

## 2023-11-26 RX ADMIN — FUROSEMIDE 60 MG: 10 INJECTION, SOLUTION INTRAMUSCULAR; INTRAVENOUS at 05:17

## 2023-11-26 RX ADMIN — ATORVASTATIN CALCIUM 20 MG: 20 TABLET, FILM COATED ORAL at 01:22

## 2023-11-26 RX ADMIN — ASPIRIN 81 MG CHEWABLE TABLET 81 MG: 81 TABLET CHEWABLE at 08:35

## 2023-11-26 RX ADMIN — REMDESIVIR 200 MG: 100 INJECTION, POWDER, LYOPHILIZED, FOR SOLUTION INTRAVENOUS at 02:37

## 2023-11-26 RX ADMIN — PETROLATUM: 1 OINTMENT TOPICAL at 09:15

## 2023-11-26 SDOH — SOCIAL STABILITY: SOCIAL INSECURITY: ARE YOU OR HAVE YOU BEEN THREATENED OR ABUSED PHYSICALLY, EMOTIONALLY, OR SEXUALLY BY ANYONE?: NO

## 2023-11-26 SDOH — SOCIAL STABILITY: SOCIAL INSECURITY: DO YOU FEEL UNSAFE GOING BACK TO THE PLACE WHERE YOU ARE LIVING?: NO

## 2023-11-26 SDOH — SOCIAL STABILITY: SOCIAL INSECURITY: DO YOU FEEL ANYONE HAS EXPLOITED OR TAKEN ADVANTAGE OF YOU FINANCIALLY OR OF YOUR PERSONAL PROPERTY?: NO

## 2023-11-26 SDOH — SOCIAL STABILITY: SOCIAL INSECURITY: ABUSE: ADULT

## 2023-11-26 SDOH — SOCIAL STABILITY: SOCIAL INSECURITY: ARE THERE ANY APPARENT SIGNS OF INJURIES/BEHAVIORS THAT COULD BE RELATED TO ABUSE/NEGLECT?: NO

## 2023-11-26 SDOH — SOCIAL STABILITY: SOCIAL INSECURITY: HAVE YOU HAD THOUGHTS OF HARMING ANYONE ELSE?: NO

## 2023-11-26 SDOH — SOCIAL STABILITY: SOCIAL INSECURITY: WERE YOU ABLE TO COMPLETE ALL THE BEHAVIORAL HEALTH SCREENINGS?: YES

## 2023-11-26 SDOH — SOCIAL STABILITY: SOCIAL INSECURITY: HAS ANYONE EVER THREATENED TO HURT YOUR FAMILY OR YOUR PETS?: NO

## 2023-11-26 SDOH — SOCIAL STABILITY: SOCIAL INSECURITY: DOES ANYONE TRY TO KEEP YOU FROM HAVING/CONTACTING OTHER FRIENDS OR DOING THINGS OUTSIDE YOUR HOME?: NO

## 2023-11-26 ASSESSMENT — ACTIVITIES OF DAILY LIVING (ADL)
DRESSING YOURSELF: INDEPENDENT
PATIENT'S MEMORY ADEQUATE TO SAFELY COMPLETE DAILY ACTIVITIES?: YES
FEEDING YOURSELF: INDEPENDENT
WALKS IN HOME: INDEPENDENT
HEARING - RIGHT EAR: FUNCTIONAL
TOILETING: INDEPENDENT
BATHING: INDEPENDENT
LACK_OF_TRANSPORTATION: NO
ADEQUATE_TO_COMPLETE_ADL: YES
JUDGMENT_ADEQUATE_SAFELY_COMPLETE_DAILY_ACTIVITIES: YES
HEARING - LEFT EAR: FUNCTIONAL
GROOMING: INDEPENDENT

## 2023-11-26 ASSESSMENT — PAIN - FUNCTIONAL ASSESSMENT
PAIN_FUNCTIONAL_ASSESSMENT: 0-10

## 2023-11-26 ASSESSMENT — PAIN SCALES - GENERAL
PAINLEVEL_OUTOF10: 0 - NO PAIN

## 2023-11-26 ASSESSMENT — COGNITIVE AND FUNCTIONAL STATUS - GENERAL
PATIENT BASELINE BEDBOUND: NO
DRESSING REGULAR LOWER BODY CLOTHING: A LOT
PERSONAL GROOMING: A LOT
DAILY ACTIVITIY SCORE: 12
DRESSING REGULAR UPPER BODY CLOTHING: A LOT
MOVING TO AND FROM BED TO CHAIR: A LOT
STANDING UP FROM CHAIR USING ARMS: A LOT
HELP NEEDED FOR BATHING: A LOT
TURNING FROM BACK TO SIDE WHILE IN FLAT BAD: A LOT
CLIMB 3 TO 5 STEPS WITH RAILING: A LOT
EATING MEALS: A LOT
TOILETING: A LOT
MOVING FROM LYING ON BACK TO SITTING ON SIDE OF FLAT BED WITH BEDRAILS: A LOT
WALKING IN HOSPITAL ROOM: A LOT
MOBILITY SCORE: 12

## 2023-11-26 ASSESSMENT — PATIENT HEALTH QUESTIONNAIRE - PHQ9
1. LITTLE INTEREST OR PLEASURE IN DOING THINGS: NOT AT ALL
SUM OF ALL RESPONSES TO PHQ9 QUESTIONS 1 & 2: 0
2. FEELING DOWN, DEPRESSED OR HOPELESS: NOT AT ALL

## 2023-11-26 ASSESSMENT — LIFESTYLE VARIABLES
AUDIT-C TOTAL SCORE: 0
SKIP TO QUESTIONS 9-10: 1
HOW OFTEN DO YOU HAVE A DRINK CONTAINING ALCOHOL: NEVER
HOW MANY STANDARD DRINKS CONTAINING ALCOHOL DO YOU HAVE ON A TYPICAL DAY: PATIENT DOES NOT DRINK
HOW OFTEN DO YOU HAVE 6 OR MORE DRINKS ON ONE OCCASION: NEVER
AUDIT-C TOTAL SCORE: 0

## 2023-11-26 NOTE — PROGRESS NOTES
"Eugene Hutson is a 48 y.o. male on day 1 of admission presenting with COVID.    Subjective   Stable overnight, transitioned to HFNC at 6L from BiPAP.    Objective     Physical Exam  Vitals reviewed.   Constitutional:       Appearance: He is not ill-appearing or toxic-appearing.   HENT:      Mouth/Throat:      Mouth: Mucous membranes are moist.   Cardiovascular:      Rate and Rhythm: Normal rate and regular rhythm.      Pulses: Normal pulses.      Heart sounds: No murmur heard.     No friction rub. No gallop.   Pulmonary:      Effort: Pulmonary effort is normal. No respiratory distress.      Breath sounds: No stridor. Rhonchi present. No wheezing or rales.      Comments: BiPAP in place on AM exam, mask not covering entire mouth  Diffuse rhonchi throughout with good air movement, no wheeze  Abdominal:      General: Abdomen is flat. There is no distension.      Palpations: Abdomen is soft. There is no mass.      Tenderness: There is no abdominal tenderness. There is no guarding.   Musculoskeletal:      Right lower leg: Edema present.      Left lower leg: Edema present.      Comments: 1+   Skin:     General: Skin is warm and dry.      Capillary Refill: Capillary refill takes less than 2 seconds.   Neurological:      General: No focal deficit present.      Mental Status: He is oriented to person, place, and time. Mental status is at baseline.         Last Recorded Vitals  Blood pressure 123/76, pulse 86, temperature 36.2 °C (97.2 °F), resp. rate 18, height 1.93 m (6' 4\"), weight (!) 222 kg (489 lb 10.3 oz), SpO2 94 %.  Intake/Output last 3 Shifts:  I/O last 3 completed shifts:  In: 375 (1.7 mL/kg) [P.O.:50; I.V.:75 (0.3 mL/kg); IV Piggyback:250]  Out: 400 (1.8 mL/kg) [Urine:400 (0.1 mL/kg/hr)]  Weight: 222.1 kg     Relevant Results  Please see results tab.    Assessment/Plan   A/P  Mr. Eugene Hutson is a 47 yo M w/ PMHx most pertinent for HFpEF (EF 55-60% 7/12/2023) likely 2/2 chronic HTN, chronic hypoxic/hypercapnic " respiratory failure likely 2/2 chart dx COPD versus chronic volume overload now on baseline LFNC 4 L/min, YANI now on CPAP qhs, and pre-T2DM/Hx T2DM. Presented to [facility] ED (11/26) w/ CC progressive dyspnea. Found to have acute on chronic hypoxic/hypercapnic respiratory failure (VBG: pH 7.25, pCO2 90 from baseline on ABG ) in s/o CXR w/ cardiomegaly + mild pulmonary venous congestion and covid pos. Most likely 2/2 acute decompensated HFpEF or acute covid infection versus less likely COPD exacerbation (no wheezing), ACS (troponin WNL x2), or PE (no CP, no sequelae DVT). Quickly weaned from BiPAP to NC over several hours - now stable on 6L and diuresing well, + managing COVID as below.     NEURO  -no active concerns     PULM  Acute on chronic hypoxic/hypercapnic respiratory failure  -chronic likely 2/2 chart dx COPD +/- chronic volume overload (baseline: LFNC 4 L/min, HCO3 >40, ABG pCO2 )  -acute likely 2/2 ADHF versus covid versus COPD exacerbation (however no wheezing on exam)  -no PFTs in EPIC  -on admission, BPAP 22/5, now weaning to daytime LFNC 4 L/min  > Will need BiPAP qhs for YANI/chronic hypercapnia)     Chart dx COPD  -no PFT in [facility], no CT C in EPIC  -pt not on home inhalers  -not wheezy on exam  -s/p methylprednisolone in ED  -duonebs q4 PRN     YANI  -adherent to auto-CPAP at bedtime at home  -Will switch to BiPAP qhs d/t chronic hypercapnia     Covid infection (pos 11/25)  -no evidence pneumonia on CXR  -acute on chronic hypoxic respiratory failure unclear if purely d/t overload or some contribution from covid  -high-risk comorbidities including HF, morbid obesity  -start remdesivir x5 days pending clinical improvement (*daily LFTs on remdesivir)  -start dexamethasone 6mg PO x5 days pending clinical improvement (*q4h SS, PPI while on CS)     CV  Acute decompensated HFpEF  -likely 2/2 chronic HTN  -last TTE (7/12/2023) w/ EF 55-60%, severely increased LV septal thickness, moderately enlarged RV  w/ mildly reduced RV systolic function, severely dilated IVC  -seemingly not on home diuresis 2/2 issue with medication access  -BNP 59 in s/o BMI 61 (baseline 88), Hx HFpEF not HFrEF  -f/u TTE  -hold home furosemide 20mg PO daily (*pt not actively taking so effectively diuretic naive)  -diuresis PRN for goal net -1 to -2 L in 24 hours   > S/p total of 120 IV lasix     HTN  -continue amlodipine 10mg PO daily  -continue losartan 50mg PO daily (Cr at baseline)  -continue hydrochlorothiazide 50mg PO daily     HLD  -continue atorvastatin 20mg PO daily     Primary prevention aspirin?  -continue aspirin 81mg PO daily     GI  -diabetic diet now that off BiPAP     RENAL  -no active concerns       -no active concerns     ENDO  Pre-T2DM, Hx T2DM  -A1c 6.2 (10/17/2023), no indication to recheck  -hold home metformin, dulaglutide while admitted  -start lispo SS q4h (while on CS), diabetic diet (when off NIPAP), hypoglycemia protocol     HEME/ONC  -no active concerns     ID  -covid as above     PSYCH/SOC  -no active concerns     Disposition  -barriers: new daytime BPAP  -destination: floor  -f/u: PCP, pulm     Diet: Diabetic diet  Electrolytes: K >4; Mg >2  DVT ppx: enoxaparin  GI ppx: pantoprazole (while on CS)  Lines/tubes: PIV x3  O2: BPAP 25/8/24/60 (weaning)  ggt: none  Restraints: none  Baseline Cr: 0.9-1.1  T+S: not indicated  Code status: full  Surrogate decision maker: Ysabel Hutson (mother) [phone]    Jeniffer Farooq MD  Internal Medicine and Pediatrics, PGY-1  Epic Chat

## 2023-11-26 NOTE — CARE PLAN
Fall prevention continued, bed wheels locked, side rails up x3, bed in lowest position, bed alarm on, room door open and lights on for ease of visibility.    Skin care plan continued, site care performed, minimal linens placed underneath and PT turned every two hours and PT checked for incontinence.        PT is in DROPLET Precaution. Personal protective equipment available outside of the pt room for application prior to entry. Isolation sign posted on door. Visitors have been educated and demonstrate understanding of isolation precautions.   COVID-19

## 2023-11-26 NOTE — ED TRIAGE NOTES
Pt to ED with c/o SOB and bilateral leg swelling x 2-3 days. Pt has hx of COPD and HF. Pt was sating 70% on RA when EMS arrived, placed on NR. Pt intermittently wears his 4L NC. Pt only endorsing SOB. Diminished lower lobes and wheezing in upper lobes. Pt 70% on 4L.

## 2023-11-26 NOTE — H&P
S  CC  Acute on chronic hypoxic/hypercapnic respiratory failure 2/2 acute decompensated HFpEF versus covid infection    HPI  Mr. Eugene Hutson is a 47 yo M w/ PMH most pertinent for HFpEF (EF 55-60% 7/12/2023) likely 2/2 chronic HTN, chronic hypoxic/hypercapnic respiratory failure likely 2/2 chart dx COPD versus chronic volume overload now on baseline LFNC 4 L/min, YANI now on CPAP qhs, morbid obesity, and pre-T2DM/Hx T2DM.    C/o acute dyspnea this AM. Lives w/ mom who called EMS. No recent sick contacts (covid pos in ED). Has had few days of new mild productive cough (clear sputum) w/o associated fevers, chills, sweats. Denies wheezing. Not actively taking home inhaler. Denies smoking hx. Denies clothes fitting tighter, denies worsening LE edema. Doesn't sleep on back but says for comfort not SOB. Does endorse orthopnea. No exertional dyspnea.    No LH/dizziness. No CP. SOB improved at time of interview. No abdominal pain, n/v/d/c. No dysuria. No LE pain.    Pt not sure what meds actively taking. Thinks taking some BP meds and dulaglutide weekly. Says not actively taking water pill (just recently picked up meds, haven't started yet). Wears auto-CPAP at bedtime, says adherent, not sure typical settings. Wears LFNC 4 L/min at night only (pt not sure if O2 is a bleed or if wears LFNC).    [facility] med list (11/6/2023):  -atorvastatin 20mg daily  -albuterol q4h PRN  -aspirin 81mg daily  -furosemide 20mg daily  -losartan 50mg daily  -metformin 1000mg BID  -amlodipine 10mg daily  -hydrochlorothiazide 50mg daily  -dulaglutide 0.75mg subcutaneous weekly  -carbamazepine 100mg BID (for diabetic neuropathy)    ED course:  VT: T 36.8, P 102, /80, SpO2 96 (on non-rebreather). CBC w/o leukocytosis (WBC 5.8), Hb at baseline (16.2, baseline 15-16), . BMP notable for HCO3 35 (baseline >40), Cr at baseline (1.05, baseline 0.9-1.1). VBG: pH 7.25, pCO2 90 (baseline on ABG ), lactate 1.4. BNP 59 in s/o BMI 61 (baseline  88). Troponin 39 to 47. Covid pos, flu A/B neg. CXR w/ cardiomegaly, mild pulmonary venous congestion. Received furosemide 60mg IV, methylprednisolone 125mg IV, duo-neb.    CARDs Hx:  TTE (7/12/2023):  1. Left ventricular systolic function is normal with a 55-60% estimated ejection fraction.  2. Severely increased left ventricular septal thickness.  3. The left ventricular posterior wall thickness is moderately increased.  4. Moderately enlarged right ventricle.  5. There is mildly reduced right ventricular systolic function.  6. The inferior vena cava appears severely dilated.  7. No previous available for comparison.    *No hx cardiac stress testing.    ROS  10-point ROS otherwise negative except for above.    PMH  -see HPI    ALL  No Known Allergies     MEDs  Current Outpatient Medications   Medication Instructions    hydroCHLOROthiazide (HYDRODiuril) 50 mg tablet TAKE 1 TABLET BY MOUTH ONCE DAILY      PSH  -reviewed (non-contributory)    SOC  -tobacco: denies  -alcohol: denies  -substance: denies  -barriers to healthcare: denies  -code status: full  -surrogate decision maker: Ysabel Hutson (mother) [phone]      O  VT  Temp:  [36.8 °C (98.2 °F)] 36.8 °C (98.2 °F)  Heart Rate:  [] 86  Resp:  [10-25] 24  BP: (105-142)/(75-90) 140/90  FiO2 (%):  [60 %] 60 %     RESP  FiO2 (%):  [60 %] 60 %  S RR:  [22] 22     I/O  No intake or output data in the 24 hours ending 11/25/23 2359     PE  General: no acute distress, on BPAP, mentating normally  Cardio: normal rate, regular rhythm, no rubs/murmurs, no S3/S4  Pulm: non-labored, no accessory muscle usage, BS symmetric, no crackles/wheezing/stridor  GI: non-distended, appropriately soft, no masses, non-tender  : no CVA tenderness  MSK: no joint swelling, grossly full active ROM  HEENT: grossly normocephalic  Neuro: grossly oriented, CN grossly intact, extremities moving symmetrically  Psych: appropriate affect  Volume: mucous membranes moist, mild LE pitting  edema  Perfusion: no cyanosis, distal extremities warm    LAB  Results for orders placed or performed during the hospital encounter of 11/25/23 (from the past 24 hour(s))   Blood Gas Venous Full Panel Unsolicited   Result Value Ref Range    POCT pH, Venous 7.25 (LL) 7.33 - 7.43 pH    POCT pCO2, Venous 90 (HH) 41 - 51 mm Hg    POCT pO2, Venous 63 (H) 35 - 45 mm Hg    POCT SO2, Venous 84 (H) 45 - 75 %    POCT Oxy Hemoglobin, Venous 81.4 (H) 45.0 - 75.0 %    POCT Hematocrit Calculated, Venous 51.0 41.0 - 52.0 %    POCT Sodium, Venous 135 (L) 136 - 145 mmol/L    POCT Potassium, Venous 4.3 3.5 - 5.3 mmol/L    POCT Chloride, Venous 95 (L) 98 - 107 mmol/L    POCT Ionized Calicum, Venous 1.02 (L) 1.10 - 1.33 mmol/L    POCT Glucose, Venous 192 (H) 74 - 99 mg/dL    POCT Lactate, Venous 1.4 0.4 - 2.0 mmol/L    POCT Base Excess, Venous 7.5 (H) -2.0 - 3.0 mmol/L    POCT HCO3 Calculated, Venous 39.5 (H) 22.0 - 26.0 mmol/L    POCT Hemoglobin, Venous 17.1 13.5 - 17.5 g/dL    POCT Anion Gap, Venous 5.0 (L) 10.0 - 25.0 mmol/L    Patient Temperature 37.0 degrees Celsius   CBC and Auto Differential   Result Value Ref Range    WBC 5.8 4.4 - 11.3 x10*3/uL    nRBC 0.9 (H) 0.0 - 0.0 /100 WBCs    RBC 5.12 4.50 - 5.90 x10*6/uL    Hemoglobin 16.2 13.5 - 17.5 g/dL    Hematocrit 51.5 41.0 - 52.0 %     (H) 80 - 100 fL    MCH 31.6 26.0 - 34.0 pg    MCHC 31.5 (L) 32.0 - 36.0 g/dL    RDW 12.6 11.5 - 14.5 %    Platelets 239 150 - 450 x10*3/uL    Neutrophils % 53.8 40.0 - 80.0 %    Immature Granulocytes %, Automated 0.5 0.0 - 0.9 %    Lymphocytes % 33.2 13.0 - 44.0 %    Monocytes % 9.6 2.0 - 10.0 %    Eosinophils % 2.4 0.0 - 6.0 %    Basophils % 0.5 0.0 - 2.0 %    Neutrophils Absolute 3.13 1.20 - 7.70 x10*3/uL    Immature Granulocytes Absolute, Automated 0.03 0.00 - 0.70 x10*3/uL    Lymphocytes Absolute 1.93 1.20 - 4.80 x10*3/uL    Monocytes Absolute 0.56 0.10 - 1.00 x10*3/uL    Eosinophils Absolute 0.14 0.00 - 0.70 x10*3/uL    Basophils  Absolute 0.03 0.00 - 0.10 x10*3/uL   Basic Metabolic Panel   Result Value Ref Range    Glucose 183 (H) 74 - 99 mg/dL    Sodium 139 136 - 145 mmol/L    Potassium 3.9 3.5 - 5.3 mmol/L    Chloride 96 (L) 98 - 107 mmol/L    Bicarbonate 35 (H) 21 - 32 mmol/L    Anion Gap 12 10 - 20 mmol/L    Urea Nitrogen 17 6 - 23 mg/dL    Creatinine 1.15 0.50 - 1.30 mg/dL    eGFR 79 >60 mL/min/1.73m*2    Calcium 8.4 (L) 8.6 - 10.6 mg/dL   Magnesium   Result Value Ref Range    Magnesium 1.67 1.60 - 2.40 mg/dL   B-Type Natriuretic Peptide   Result Value Ref Range    BNP 59 0 - 99 pg/mL   Troponin I, High Sensitivity, Initial   Result Value Ref Range    Troponin I, High Sensitivity 39 0 - 53 ng/L   Light Blue Top   Result Value Ref Range    Extra Tube Hold for add-ons.    SST TOP   Result Value Ref Range    Extra Tube Hold for add-ons.    Sars-CoV-2 PCR, Screen Asymptomatic   Result Value Ref Range    Coronavirus 2019, PCR Detected (A) Not Detected   Influenza A, and B PCR   Result Value Ref Range    Flu A Result Not Detected Not Detected    Flu B Result Not Detected Not Detected   Troponin, High Sensitivity, 1 Hour   Result Value Ref Range    Troponin I, High Sensitivity 47 0 - 53 ng/L   BLOOD GAS VENOUS FULL PANEL   Result Value Ref Range    POCT pH, Venous 7.33 7.33 - 7.43 pH    POCT pCO2, Venous 76 (HH) 41 - 51 mm Hg    POCT pO2, Venous 54 (H) 35 - 45 mm Hg    POCT SO2, Venous 79 (H) 45 - 75 %    POCT Oxy Hemoglobin, Venous 76.0 (H) 45.0 - 75.0 %    POCT Hematocrit Calculated, Venous 50.0 41.0 - 52.0 %    POCT Sodium, Venous 134 (L) 136 - 145 mmol/L    POCT Potassium, Venous 4.3 3.5 - 5.3 mmol/L    POCT Chloride, Venous 96 (L) 98 - 107 mmol/L    POCT Ionized Calicum, Venous 1.05 (L) 1.10 - 1.33 mmol/L    POCT Glucose, Venous 205 (H) 74 - 99 mg/dL    POCT Lactate, Venous 1.2 0.4 - 2.0 mmol/L    POCT Base Excess, Venous 10.1 (H) -2.0 - 3.0 mmol/L    POCT HCO3 Calculated, Venous 40.1 (H) 22.0 - 26.0 mmol/L    POCT Hemoglobin, Venous 16.6  13.5 - 17.5 g/dL    POCT Anion Gap, Venous 2.0 (L) 10.0 - 25.0 mmol/L    Patient Temperature 37.0 degrees Celsius    FiO2 100 %   Blood Gas Venous Full Panel Unsolicited   Result Value Ref Range    POCT pH, Venous 7.27 (L) 7.33 - 7.43 pH    POCT pCO2, Venous 90 (HH) 41 - 51 mm Hg    POCT pO2, Venous 89 (H) 35 - 45 mm Hg    POCT SO2, Venous 96 (H) 45 - 75 %    POCT Oxy Hemoglobin, Venous 92.9 (H) 45.0 - 75.0 %    POCT Hematocrit Calculated, Venous 53.0 (H) 41.0 - 52.0 %    POCT Sodium, Venous 133 (L) 136 - 145 mmol/L    POCT Potassium, Venous 4.6 3.5 - 5.3 mmol/L    POCT Chloride, Venous 97 (L) 98 - 107 mmol/L    POCT Ionized Calicum, Venous 1.07 (L) 1.10 - 1.33 mmol/L    POCT Glucose, Venous 219 (H) 74 - 99 mg/dL    POCT Lactate, Venous 0.9 0.4 - 2.0 mmol/L    POCT Base Excess, Venous 9.2 (H) -2.0 - 3.0 mmol/L    POCT HCO3 Calculated, Venous 41.3 (H) 22.0 - 26.0 mmol/L    POCT Hemoglobin, Venous 17.8 (H) 13.5 - 17.5 g/dL    POCT Anion Gap, Venous -1.0 (L) 10.0 - 25.0 mmol/L    Patient Temperature 37.0 degrees Celsius   Blood Gas Venous Full Panel Unsolicited   Result Value Ref Range    POCT pH, Venous 7.25 (LL) 7.33 - 7.43 pH    POCT pCO2, Venous 97 (HH) 41 - 51 mm Hg    POCT pO2, Venous 122 (H) 35 - 45 mm Hg    POCT SO2, Venous 99 (H) 45 - 75 %    POCT Oxy Hemoglobin, Venous 96.0 (H) 45.0 - 75.0 %    POCT Hematocrit Calculated, Venous 53.0 (H) 41.0 - 52.0 %    POCT Sodium, Venous 136 136 - 145 mmol/L    POCT Potassium, Venous 4.7 3.5 - 5.3 mmol/L    POCT Chloride, Venous 95 (L) 98 - 107 mmol/L    POCT Ionized Calicum, Venous 1.11 1.10 - 1.33 mmol/L    POCT Glucose, Venous 231 (H) 74 - 99 mg/dL    POCT Lactate, Venous 0.9 0.4 - 2.0 mmol/L    POCT Base Excess, Venous 9.7 (H) -2.0 - 3.0 mmol/L    POCT HCO3 Calculated, Venous 42.5 (H) 22.0 - 26.0 mmol/L    POCT Hemoglobin, Venous 17.5 13.5 - 17.5 g/dL    POCT Anion Gap, Venous 3.0 (L) 10.0 - 25.0 mmol/L    Patient Temperature 37.0 degrees Celsius      IMG  XR chest 1  view  Result Date: 11/25/2023  1. Cardiomegaly with suggestion of mild pulmonary venous congestion similar to prior.        A/P  Mr. Eugene Hutson is a 49 yo M w/ PMH most pertinent for HFpEF (EF 55-60% 7/12/2023) likely 2/2 chronic HTN, chronic hypoxic/hypercapnic respiratory failure likely 2/2 chart dx COPD versus chronic volume overload now on baseline LFNC 4 L/min, YANI now on CPAP qhs, and pre-T2DM/Hx T2DM. Presented to [facility] ED (11/26) w/ CC progressive dyspnea. Found to have acute on chronic hypoxic/hypercapnic respiratory failure (VBG: pH 7.25, pCO2 90 from baseline on ABG ) in s/o CXR w/ cardiomegaly + mild pulmonary venous congestion and covid pos. Most likely 2/2 acute decompensated HFpEF or acute covid infection versus less likely COPD exacerbation (no wheezing), ACS (troponin WNL x2), or PE (no CP, no sequelae DVT). Now diuresing, treating covid d/t acute hypoxia and high risk-factors. Weaning daytime BPAP as able.    NEURO  -no active concerns    PULM  Acute on chronic hypoxic/hypercapnic respiratory failure  -chronic likely 2/2 chart dx COPD +/- chronic volume overload (baseline: LFNC 4 L/min, HCO3 >40, ABG pCO2 )  -acute likely 2/2 ADHF versus covid versus COPD exacerbation (however no wheezing on exam)  -no PFTs in EPIC  -on admission, BPAP 22/5 (will try to wean to daytime LFNC 4 L/min, consider BPAP qhs for YANI/chronic hypercapnia)    Chart dx COPD  -no PFT in [facility], no CT C in EPIC  -pt not on home inhalers  -not wheezy on exam  -s/p methylprednisolone in ED  -start duobeb q4h (can change to PRN as clinical status improves)    YANI  -adherent to auto-CPAP at bedtime at home  -consider switching to BPAP qhs d/t chronic hypercapnia    Covid infection (pos 11/25)  -no evidence pneumonia on CXR  -acute on chronic hypoxic respiratory failure unclear if purely d/t overload or some contribution from covid  -high-risk comorbidities including HF, morbid obesity  -start remdesivir x5 days  pending clinical improvement (*daily LFTs on remdesivir)  -start dexamethasone 6mg PO x5 days pending clinical improvement (*q4h SS, PPI while on CS)    CV  Acute decompensated HFpEF  -likely 2/2 chronic HTN  -last TTE (7/12/2023) w/ EF 55-60%, severely increased LV septal thickness, moderately enlarged RV w/ mildly reduced RV systolic function, severely dilated IVC  -seemingly not on home diuresis  -BNP 59 in s/o BMI 61 (baseline 88), Hx HFpEF not HFrEF  -f/u TTE  -hold home furosemide 20mg PO daily (*pt not actively taking so effectively diuretic naive)  -diuresis PRN for goal net -1 to -2 L in 24 hours    HTN  -continue amlodipine 10mg PO daily  -continue losartan 50mg PO daily (Cr at baseline)  -continue hydrochlorothiazide 50mg PO daily    HLD  -continue atorvastatin 20mg PO daily    Primary prevention aspirin?  -continue aspirin 81mg PO daily    GI  -no active concerns    RENAL  -no active concerns      -no active concerns    ENDO  Pre-T2DM, Hx T2DM  -A1c 6.2 (10/17/2023), no indication to recheck  -hold home metformin, dulaglutide while admitted  -start lispo SS q4h (while on CS), diabetic diet (when off NIPAP), hypoglycemia protocol    HEME/ONC  -no active concerns    ID  -covid as above    PSYCH/SOC  -no active concerns    Disposition  -barriers: new daytime BPAP  -destination: floor  -f/u: PCP, pulm    Diet: NPO (diabetic when off daytime NIPAP)  Electrolytes: K >4; Mg >2  DVT ppx: enoxaparin  GI ppx: pantoprazole (while on CS)  Lines/tubes: PIV x3  O2: BPAP 25/8/24/60 (weaning)  ggt: none  Restraints: none  Baseline Cr: 0.9-1.1  T+S: not indicated  Code status: full  Surrogate decision maker: Ysabel Hutson (mother) [phone]      Wilmer ELAINE, PGY2

## 2023-11-26 NOTE — ED PROVIDER NOTES
HPI  Patient is a 48-year-old male with PMH of COPD (baseline 4 L NC), CHF (EF 55 to 60% July 2023), DM2, and YANI requiring CPAP presenting to the emergency department for shortness of breath.  Patient states that he has been feeling like this for the last 4 days or so.  He had a home pulse ox that he noticed that he was approximately 70% on his baseline 4 L.  Does state that he feels like he has been getting weight despite taking his diuretics    Physical Exam  VITALS: Vital signs reviewed in nursing triage note, EMR flow sheets, and at patient's bedside  CONSTITUTIONAL: Well-appearing, in no apparent distress  HEAD: NCAT  EYES: PERRL, EOMI  NECK: Supple, non-tender, full ROM  CARD: RRR, no m/r/g, no JVD  RESP: Diminished respiratory effort, diminished breath sounds bilaterally, intercostal retractions appreciated, wheezing appreciated in the left upper lobe  ABD: Bowel sounds present, non-distended, soft and non-tender, no palpable organomegaly, no masses, no guarding or rebound tenderness  BACK: No vertebral point or CVA tenderness, no obvious bony deformities, no spinal step-offs   EXT: Normal ROM in all four extremities, 2+ radial and DP pulses bilaterally, no edema  SKIN: Dry with appropriate turgor, no apparent rashes, suspicious lesions, or masses   NEURO: CNs II-XII grossly intact, AAOx4, sensation intact bilaterally, strength 5/5 on UEs and LEs bilaterally, speech is fluent and comprehensible  PSYCH: Appropriate mood and affect, no HI/SI, not responding to internal stimuli    Vitals:    11/25/23 2140   BP: 131/85   Pulse: 86   Resp: 22   Temp:    SpO2: 93%       Assessment/Plan/MDM  Patient in critical condition upon arrival to the emergency department.  Initial O2 sats noted to be 50% on room air.  He was put on nonrebreather, however only incremented to the mid 70%s.  Decision made to place on BiPAP and later switched to PCV at 20/5.  Bedside POCUS was performed that demonstrated B-lines in the left  lobe, and given the fact that he was in respiratory distress with a history of CHF was provided with Lasix 60 mg IV.  Additionally provided with DuoNebs x 3 and Solu-Medrol 125 mg IV for COPD coverage.  VBG at that time demonstrated respiratory acidosis, however patient was awake and oriented at that time.  Repeat VBG did demonstrate slight improvement of his acidosis.  EKG obtained without any significant ischemic changes.  Patient was sent for CBC, CMP, magnesium, BNP, delta troponin, as well as flu/COVID aside from respiratory acidosis, most of his laboratory workup was largely unremarkable.  Was noted to be COVID-positive, which was corroborated on x-ray with multiple bilateral infiltrates.  Afebrile, therefore do not believe that he requires antibiotics at this time.  Patient did have an episode of becoming slightly more somnolent at which point a VBG was obtained that demonstrated slight worsening of his acidosis as well as hypercapnia.  PCV was taken to 24/5 and patient was repositioned anatomically to improve ventilation with plans to repeat VBG.  Given his likelihood for deterioration should he come off BiPAP, persistent acidosis with hypercapnic respiratory failure, and COVID diagnosis, believe that patient is best suited in the MICU.  Will be admitted to them in stable condition for continued management of all the above.    Results  *See section(s) entitled ``Lab Results´´, ``Diagnostic Imaging Results Review´´ for entirety.  Notable results listed below  - Labs: I independently interpreted as initial VBG demonstrates pH 7.25 and pCO2 of 90, subsequent VBG demonstrates pH 7.33 with pCO2 76, after mentation change VBG was pH 7.3 with pCO2 of 86, COVID-positive, remainder of laboratory workup unremarkable  - Images: I independently interpreted as bilateral pulmonary infiltrates with pulmonary congestion    ED Course as of 11/25/23 2149   Sat Nov 25, 2023 2113 EKG obtained at 1908, normal sinus uncovered  106, all intervals normal length, normal axis, no ST elevations, isolated T wave version lead V1, no significant change compared to prior EKGs [JV]   2113 Coronavirus 2019, PCR(!): Detected [JV]   2113 POCT pCO2, Venous(!!): 76  VBG is improving, however given the degree of pulmonary comorbidities he has combined with his COVID diagnosis, patient would not benefit from being taken off BiPAP at this time.  If he would be taken off BiPAP, it would be to Airvo, in settings of still being appropriate for the floor.  Given his potential for decompensation, do not believe that he is floor appropriate, and as such we will admit to the MICU. [JV]      ED Course User Index  [JV] Cleveland Melton MD        Clinical Impression  COVID  COPD Exacerbation    Dispo  Admit to MICU    Patient seen and discussed with attending physician Dr. Ma.    Cleveland Melton MD  Emergency Medicine, PGY-3    Was dictated using Dragon dictation. Please excuse any errors found in the note.     Cleveland Melton MD  Resident  11/25/23 1838

## 2023-11-26 NOTE — HOSPITAL COURSE
Mr. Eugene Hutson is a 47 yo M w/ PMHx most pertinent for HFpEF (EF 55-60% 7/12/2023) likely 2/2 chronic HTN, chronic hypoxic/hypercapnic respiratory failure likely 2/2 chart dx COPD versus chronic volume overload now on baseline LFNC 4 L/min, YANI now on CPAP qhs, and pre-T2DM/Hx T2DM. Presented to [facility] ED (11/26) w/ CC progressive dyspnea. Found to have acute on chronic hypoxic/hypercapnic respiratory failure (VBG: pH 7.25, pCO2 90 from baseline on ABG ) in s/o CXR w/ cardiomegaly + mild pulmonary venous congestion and covid pos. Most likely 2/2 acute decompensated HFpEF or acute covid infection versus less likely COPD exacerbation (no wheezing), ACS (troponin WNL x2), or PE (no CP, no sequelae DVT). Received 125 of IV methylpred and duonebs in the ED, and began aggressive diuresis, with good UOP. Initially placed on BiPAP, weaned to NC after several hours. Course of dexamethasone and remdesivir started for COVID. Tolerating diet and on 6L NC (from baseline of 4L) on transfer to the floor. Pt improved with treatment. States his symptoms are better. He is back to his baseline oxygen requiremnet. Will increased his lasix from 20mg to 40mg on discharge.

## 2023-11-27 ENCOUNTER — APPOINTMENT (OUTPATIENT)
Dept: CARDIOLOGY | Facility: HOSPITAL | Age: 48
DRG: 177 | End: 2023-11-27
Payer: MEDICARE

## 2023-11-27 LAB
ALBUMIN SERPL BCP-MCNC: 3.2 G/DL (ref 3.4–5)
ANION GAP SERPL CALC-SCNC: 11 MMOL/L (ref 10–20)
BUN SERPL-MCNC: 25 MG/DL (ref 6–23)
CALCIUM SERPL-MCNC: 8.4 MG/DL (ref 8.6–10.6)
CHLORIDE SERPL-SCNC: 97 MMOL/L (ref 98–107)
CO2 SERPL-SCNC: 37 MMOL/L (ref 21–32)
CREAT SERPL-MCNC: 0.99 MG/DL (ref 0.5–1.3)
GFR SERPL CREATININE-BSD FRML MDRD: >90 ML/MIN/1.73M*2
GLUCOSE BLD MANUAL STRIP-MCNC: 184 MG/DL (ref 74–99)
GLUCOSE BLD MANUAL STRIP-MCNC: 192 MG/DL (ref 74–99)
GLUCOSE BLD MANUAL STRIP-MCNC: 257 MG/DL (ref 74–99)
GLUCOSE BLD MANUAL STRIP-MCNC: 446 MG/DL (ref 74–99)
GLUCOSE SERPL-MCNC: 217 MG/DL (ref 74–99)
MAGNESIUM SERPL-MCNC: 2.01 MG/DL (ref 1.6–2.4)
PHOSPHATE SERPL-MCNC: 4.8 MG/DL (ref 2.5–4.9)
POTASSIUM SERPL-SCNC: 4.4 MMOL/L (ref 3.5–5.3)
SODIUM SERPL-SCNC: 141 MMOL/L (ref 136–145)

## 2023-11-27 PROCEDURE — 99233 SBSQ HOSP IP/OBS HIGH 50: CPT | Performed by: INTERNAL MEDICINE

## 2023-11-27 PROCEDURE — 36415 COLL VENOUS BLD VENIPUNCTURE: CPT | Performed by: STUDENT IN AN ORGANIZED HEALTH CARE EDUCATION/TRAINING PROGRAM

## 2023-11-27 PROCEDURE — 1200000002 HC GENERAL ROOM WITH TELEMETRY DAILY

## 2023-11-27 PROCEDURE — 80069 RENAL FUNCTION PANEL: CPT | Performed by: STUDENT IN AN ORGANIZED HEALTH CARE EDUCATION/TRAINING PROGRAM

## 2023-11-27 PROCEDURE — 94660 CPAP INITIATION&MGMT: CPT

## 2023-11-27 PROCEDURE — 2500000005 HC RX 250 GENERAL PHARMACY W/O HCPCS

## 2023-11-27 PROCEDURE — 97165 OT EVAL LOW COMPLEX 30 MIN: CPT | Mod: GO | Performed by: OCCUPATIONAL THERAPIST

## 2023-11-27 PROCEDURE — 97161 PT EVAL LOW COMPLEX 20 MIN: CPT | Mod: GP | Performed by: PHYSICAL THERAPIST

## 2023-11-27 PROCEDURE — 82947 ASSAY GLUCOSE BLOOD QUANT: CPT

## 2023-11-27 PROCEDURE — 96372 THER/PROPH/DIAG INJ SC/IM: CPT

## 2023-11-27 PROCEDURE — 2500000004 HC RX 250 GENERAL PHARMACY W/ HCPCS (ALT 636 FOR OP/ED): Performed by: STUDENT IN AN ORGANIZED HEALTH CARE EDUCATION/TRAINING PROGRAM

## 2023-11-27 PROCEDURE — 2500000004 HC RX 250 GENERAL PHARMACY W/ HCPCS (ALT 636 FOR OP/ED)

## 2023-11-27 PROCEDURE — 83735 ASSAY OF MAGNESIUM: CPT | Performed by: STUDENT IN AN ORGANIZED HEALTH CARE EDUCATION/TRAINING PROGRAM

## 2023-11-27 PROCEDURE — 2500000002 HC RX 250 W HCPCS SELF ADMINISTERED DRUGS (ALT 637 FOR MEDICARE OP, ALT 636 FOR OP/ED): Performed by: STUDENT IN AN ORGANIZED HEALTH CARE EDUCATION/TRAINING PROGRAM

## 2023-11-27 PROCEDURE — 2500000001 HC RX 250 WO HCPCS SELF ADMINISTERED DRUGS (ALT 637 FOR MEDICARE OP): Performed by: STUDENT IN AN ORGANIZED HEALTH CARE EDUCATION/TRAINING PROGRAM

## 2023-11-27 PROCEDURE — 96372 THER/PROPH/DIAG INJ SC/IM: CPT | Performed by: STUDENT IN AN ORGANIZED HEALTH CARE EDUCATION/TRAINING PROGRAM

## 2023-11-27 RX ORDER — FUROSEMIDE 10 MG/ML
60 INJECTION INTRAMUSCULAR; INTRAVENOUS ONCE
Status: COMPLETED | OUTPATIENT
Start: 2023-11-27 | End: 2023-11-27

## 2023-11-27 RX ORDER — DEXAMETHASONE 6 MG/1
6 TABLET ORAL DAILY
Status: DISCONTINUED | OUTPATIENT
Start: 2023-11-28 | End: 2023-11-28

## 2023-11-27 RX ADMIN — INSULIN LISPRO 3 UNITS: 100 INJECTION, SOLUTION INTRAVENOUS; SUBCUTANEOUS at 10:29

## 2023-11-27 RX ADMIN — LOSARTAN POTASSIUM 50 MG: 50 TABLET, FILM COATED ORAL at 10:30

## 2023-11-27 RX ADMIN — ENOXAPARIN SODIUM 60 MG: 100 INJECTION SUBCUTANEOUS at 01:57

## 2023-11-27 RX ADMIN — INSULIN LISPRO 3 UNITS: 100 INJECTION, SOLUTION INTRAVENOUS; SUBCUTANEOUS at 18:19

## 2023-11-27 RX ADMIN — FUROSEMIDE 60 MG: 10 INJECTION, SOLUTION INTRAVENOUS at 13:32

## 2023-11-27 RX ADMIN — HYDROCHLOROTHIAZIDE 50 MG: 25 TABLET ORAL at 10:29

## 2023-11-27 RX ADMIN — ENOXAPARIN SODIUM 60 MG: 100 INJECTION SUBCUTANEOUS at 13:32

## 2023-11-27 RX ADMIN — ATORVASTATIN CALCIUM 20 MG: 20 TABLET, FILM COATED ORAL at 20:10

## 2023-11-27 RX ADMIN — AMLODIPINE BESYLATE 10 MG: 10 TABLET ORAL at 10:29

## 2023-11-27 RX ADMIN — INSULIN LISPRO 9 UNITS: 100 INJECTION, SOLUTION INTRAVENOUS; SUBCUTANEOUS at 13:32

## 2023-11-27 RX ADMIN — REMDESIVIR 100 MG: 100 INJECTION, POWDER, LYOPHILIZED, FOR SOLUTION INTRAVENOUS at 00:14

## 2023-11-27 RX ADMIN — ASPIRIN 81 MG CHEWABLE TABLET 81 MG: 81 TABLET CHEWABLE at 10:28

## 2023-11-27 ASSESSMENT — COGNITIVE AND FUNCTIONAL STATUS - GENERAL
HELP NEEDED FOR BATHING: A LITTLE
MOVING TO AND FROM BED TO CHAIR: A LITTLE
DRESSING REGULAR LOWER BODY CLOTHING: A LITTLE
STANDING UP FROM CHAIR USING ARMS: A LITTLE
CLIMB 3 TO 5 STEPS WITH RAILING: A LOT
TOILETING: A LITTLE
MOVING FROM LYING ON BACK TO SITTING ON SIDE OF FLAT BED WITH BEDRAILS: A LITTLE
DAILY ACTIVITIY SCORE: 22
HELP NEEDED FOR BATHING: A LITTLE
WALKING IN HOSPITAL ROOM: A LITTLE
DRESSING REGULAR UPPER BODY CLOTHING: A LITTLE
CLIMB 3 TO 5 STEPS WITH RAILING: A LOT
MOBILITY SCORE: 17
MOBILITY SCORE: 22
TURNING FROM BACK TO SIDE WHILE IN FLAT BAD: A LITTLE
DAILY ACTIVITIY SCORE: 20
DRESSING REGULAR LOWER BODY CLOTHING: A LITTLE

## 2023-11-27 ASSESSMENT — PAIN SCALES - GENERAL
PAINLEVEL_OUTOF10: 0 - NO PAIN

## 2023-11-27 ASSESSMENT — PAIN - FUNCTIONAL ASSESSMENT
PAIN_FUNCTIONAL_ASSESSMENT: 0-10

## 2023-11-27 ASSESSMENT — ACTIVITIES OF DAILY LIVING (ADL)
ADL_ASSISTANCE: INDEPENDENT
ADL_ASSISTANCE: INDEPENDENT

## 2023-11-27 NOTE — PROGRESS NOTES
Eugene Hutson is a 48 y.o. male on day 2 of admission presenting with COVID.    Hospital Course    Mr. Eugene Hutson is a 49 yo M w/ PMHx most pertinent for HFpEF (EF 55-60% 7/12/2023) likely 2/2 chronic HTN, YANI on cpap at night, chronic hypoxic/hypercapnic respiratory failure on 4L NC at baseline who presented with acute on chronic hypoxic/hypercapnic respiratory requiring micu admission for BIPAP.  Found to be covid positive.  As well concern for acute decompensated heart failure.  Received IV diuresis in the ED.  In the MICU he was started on remdesivir and dexamethasone.  He was shortly weaned off BIPAP in the MICU down to 6L NC.  Received in additional 60mg of IV Lasix in the MICU with good response.      Subjective   Patient is resting comfortably in bed on 6L by NC.  States he is feeling much better from when he first arrived.  Endorsed some occasional muscle spasms in his right arm that have been going on prior to this admission.       Objective     Physical Exam  Constitutional:       General: He is not in acute distress.     Appearance: He is obese.   HENT:      Head: Normocephalic and atraumatic.      Mouth/Throat:      Mouth: Mucous membranes are moist.   Eyes:      Extraocular Movements: Extraocular movements intact.      Conjunctiva/sclera: Conjunctivae normal.   Cardiovascular:      Rate and Rhythm: Normal rate and regular rhythm.      Heart sounds: No murmur heard.     No friction rub. No gallop.   Pulmonary:      Effort: Pulmonary effort is normal. No respiratory distress.      Breath sounds: No wheezing.   Abdominal:      General: Bowel sounds are normal. There is no distension.      Palpations: Abdomen is soft.      Tenderness: There is no abdominal tenderness.   Musculoskeletal:         General: Normal range of motion.      Cervical back: Normal range of motion.      Right lower leg: No edema.      Left lower leg: Edema present.      Comments: Trace pitting edema in left foot and below knee  "  Skin:     General: Skin is warm.   Neurological:      General: No focal deficit present.      Mental Status: He is alert.   Psychiatric:         Mood and Affect: Mood normal.         Last Recorded Vitals  Blood pressure 121/64, pulse 80, temperature 36.1 °C (97 °F), resp. rate 13, height 1.93 m (6' 4\"), weight (!) 222 kg (489 lb 10.3 oz), SpO2 96 %.  Intake/Output last 3 Shifts:  I/O last 3 completed shifts:  In: 785 (3.5 mL/kg) [P.O.:450; I.V.:85 (0.4 mL/kg); IV Piggyback:250]  Out: 1450 (6.5 mL/kg) [Urine:1450 (0.2 mL/kg/hr)]  Weight: 222.1 kg     Relevant Results  Results for orders placed or performed during the hospital encounter of 11/25/23 (from the past 24 hour(s))   ECG 12 lead   Result Value Ref Range    Ventricular Rate 106 BPM    Atrial Rate 106 BPM    IL Interval 134 ms    QRS Duration 82 ms    QT Interval 316 ms    QTC Calculation(Bazett) 419 ms    P Axis 58 degrees    R Axis 5 degrees    T Axis 68 degrees    QRS Count 17 beats    Q Onset 214 ms    P Onset 147 ms    P Offset 202 ms    T Offset 372 ms    QTC Fredericia 381 ms   CBC and Auto Differential   Result Value Ref Range    WBC 5.1 4.4 - 11.3 x10*3/uL    nRBC 1.4 (H) 0.0 - 0.0 /100 WBCs    RBC 5.28 4.50 - 5.90 x10*6/uL    Hemoglobin 16.5 13.5 - 17.5 g/dL    Hematocrit 54.1 (H) 41.0 - 52.0 %     (H) 80 - 100 fL    MCH 31.3 26.0 - 34.0 pg    MCHC 30.5 (L) 32.0 - 36.0 g/dL    RDW 12.6 11.5 - 14.5 %    Platelets 216 150 - 450 x10*3/uL    Neutrophils % 66.2 40.0 - 80.0 %    Immature Granulocytes %, Automated 1.0 (H) 0.0 - 0.9 %    Lymphocytes % 28.3 13.0 - 44.0 %    Monocytes % 4.1 2.0 - 10.0 %    Eosinophils % 0.0 0.0 - 6.0 %    Basophils % 0.4 0.0 - 2.0 %    Neutrophils Absolute 3.37 1.20 - 7.70 x10*3/uL    Immature Granulocytes Absolute, Automated 0.05 0.00 - 0.70 x10*3/uL    Lymphocytes Absolute 1.44 1.20 - 4.80 x10*3/uL    Monocytes Absolute 0.21 0.10 - 1.00 x10*3/uL    Eosinophils Absolute 0.00 0.00 - 0.70 x10*3/uL    Basophils Absolute " 0.02 0.00 - 0.10 x10*3/uL   Magnesium   Result Value Ref Range    Magnesium 1.98 1.60 - 2.40 mg/dL   Hepatic function panel   Result Value Ref Range    Albumin 3.7 3.4 - 5.0 g/dL    Bilirubin, Total 0.5 0.0 - 1.2 mg/dL    Bilirubin, Direct 0.1 0.0 - 0.3 mg/dL    Alkaline Phosphatase 67 33 - 120 U/L    ALT 17 10 - 52 U/L    AST 12 9 - 39 U/L    Total Protein 6.8 6.4 - 8.2 g/dL   Blood Gas Venous Full Panel   Result Value Ref Range    POCT pH, Venous 7.26 (L) 7.33 - 7.43 pH    POCT pCO2, Venous 87 (HH) 41 - 51 mm Hg    POCT pO2, Venous 75 (H) 35 - 45 mm Hg    POCT SO2, Venous 94 (H) 45 - 75 %    POCT Oxy Hemoglobin, Venous 91.0 (H) 45.0 - 75.0 %    POCT Hematocrit Calculated, Venous 52.0 41.0 - 52.0 %    POCT Sodium, Venous 133 (L) 136 - 145 mmol/L    POCT Potassium, Venous 4.5 3.5 - 5.3 mmol/L    POCT Chloride, Venous 96 (L) 98 - 107 mmol/L    POCT Ionized Calicum, Venous 1.06 (L) 1.10 - 1.33 mmol/L    POCT Glucose, Venous 236 (H) 74 - 99 mg/dL    POCT Lactate, Venous 1.1 0.4 - 2.0 mmol/L    POCT Base Excess, Venous 7.3 (H) -2.0 - 3.0 mmol/L    POCT HCO3 Calculated, Venous 39.0 (H) 22.0 - 26.0 mmol/L    POCT Hemoglobin, Venous 17.2 13.5 - 17.5 g/dL    POCT Anion Gap, Venous 3.0 (L) 10.0 - 25.0 mmol/L    Patient Temperature 37.0 degrees Celsius    FiO2 60 %   Phosphorus   Result Value Ref Range    Phosphorus 5.9 (H) 2.5 - 4.9 mg/dL   Basic Metabolic Panel   Result Value Ref Range    Glucose 215 (H) 74 - 99 mg/dL    Sodium 140 136 - 145 mmol/L    Potassium 4.6 3.5 - 5.3 mmol/L    Chloride 96 (L) 98 - 107 mmol/L    Bicarbonate 34 (H) 21 - 32 mmol/L    Anion Gap 15 10 - 20 mmol/L    Urea Nitrogen 21 6 - 23 mg/dL    Creatinine 1.19 0.50 - 1.30 mg/dL    eGFR 75 >60 mL/min/1.73m*2    Calcium 8.5 (L) 8.6 - 10.6 mg/dL   POCT GLUCOSE   Result Value Ref Range    POCT Glucose 209 (H) 74 - 99 mg/dL   Blood Gas Venous Full Panel   Result Value Ref Range    POCT pH, Venous 7.30 (L) 7.33 - 7.43 pH    POCT pCO2, Venous 82 (HH) 41 -  51 mm Hg    POCT pO2, Venous 57 (H) 35 - 45 mm Hg    POCT SO2, Venous 85 (H) 45 - 75 %    POCT Oxy Hemoglobin, Venous 83.0 (H) 45.0 - 75.0 %    POCT Hematocrit Calculated, Venous 51.0 41.0 - 52.0 %    POCT Sodium, Venous 134 (L) 136 - 145 mmol/L    POCT Potassium, Venous 4.6 3.5 - 5.3 mmol/L    POCT Chloride, Venous 96 (L) 98 - 107 mmol/L    POCT Ionized Calicum, Venous 1.07 (L) 1.10 - 1.33 mmol/L    POCT Glucose, Venous 214 (H) 74 - 99 mg/dL    POCT Lactate, Venous 1.2 0.4 - 2.0 mmol/L    POCT Base Excess, Venous 9.3 (H) -2.0 - 3.0 mmol/L    POCT HCO3 Calculated, Venous 40.3 (H) 22.0 - 26.0 mmol/L    POCT Hemoglobin, Venous 17.1 13.5 - 17.5 g/dL    POCT Anion Gap, Venous 2.0 (L) 10.0 - 25.0 mmol/L    Patient Temperature 37.0 degrees Celsius    FiO2 60 %   POCT GLUCOSE   Result Value Ref Range    POCT Glucose 180 (H) 74 - 99 mg/dL   POCT GLUCOSE   Result Value Ref Range    POCT Glucose 163 (H) 74 - 99 mg/dL   POCT GLUCOSE   Result Value Ref Range    POCT Glucose 269 (H) 74 - 99 mg/dL   Renal function panel   Result Value Ref Range    Glucose 259 (H) 74 - 99 mg/dL    Sodium 137 136 - 145 mmol/L    Potassium 5.2 3.5 - 5.3 mmol/L    Chloride 95 (L) 98 - 107 mmol/L    Bicarbonate 32 21 - 32 mmol/L    Anion Gap 15 10 - 20 mmol/L    Urea Nitrogen 26 (H) 6 - 23 mg/dL    Creatinine 1.20 0.50 - 1.30 mg/dL    eGFR 75 >60 mL/min/1.73m*2    Calcium 8.3 (L) 8.6 - 10.6 mg/dL    Phosphorus 4.2 2.5 - 4.9 mg/dL    Albumin 3.7 3.4 - 5.0 g/dL   Magnesium   Result Value Ref Range    Magnesium 1.94 1.60 - 2.40 mg/dL   Blood Gas Venous Full Panel   Result Value Ref Range    POCT pH, Venous 7.34 7.33 - 7.43 pH    POCT pCO2, Venous 78 (HH) 41 - 51 mm Hg    POCT pO2, Venous 62 (H) 35 - 45 mm Hg    POCT SO2, Venous 86 (H) 45 - 75 %    POCT Oxy Hemoglobin, Venous 78.2 (H) 45.0 - 75.0 %    POCT Hematocrit Calculated, Venous 50.0 41.0 - 52.0 %    POCT Sodium, Venous 131 (L) 136 - 145 mmol/L    POCT Potassium, Venous 5.2 3.5 - 5.3 mmol/L    POCT  Chloride, Venous 94 (L) 98 - 107 mmol/L    POCT Ionized Calicum, Venous 1.08 (L) 1.10 - 1.33 mmol/L    POCT Glucose, Venous 281 (H) 74 - 99 mg/dL    POCT Lactate, Venous 1.2 0.4 - 2.0 mmol/L    POCT Base Excess, Venous 11.8 (H) -2.0 - 3.0 mmol/L    POCT HCO3 Calculated, Venous 42.1 (H) 22.0 - 26.0 mmol/L    POCT Hemoglobin, Venous 16.6 13.5 - 17.5 g/dL    POCT Anion Gap, Venous 0.0 (L) 10.0 - 25.0 mmol/L    Patient Temperature 37.0 degrees Celsius    FiO2 21 %        ECG 12 lead    Result Date: 11/26/2023  Sinus tachycardia Otherwise normal ECG When compared with ECG of 11-JUL-2023 08:53, No significant change was found See ED provider note for full interpretation and clinical correlation Confirmed by Mecca Simons ([id number]) on 11/26/2023 1:40:37 AM    XR chest 1 view    Result Date: 11/25/2023       1. Cardiomegaly with suggestion of mild pulmonary venous congestion similar to prior.     Signed by: Lamin Allen 11/25/2023 7:46 PM Dictation workstation:   [id number]      Assessment/Plan   Principal Problem:    COVID    Mr. Eugene Hutson is a 49 yo M w/ PMHx most pertinent for HFpEF (EF 55-60% 7/12/2023) likely 2/2 chronic HTN, chronic hypoxic/hypercapnic respiratory failure likely 2/2 chart dx COPD versus chronic volume overload now on baseline LFNC 4 L/min, YANI now on CPAP qhs, and pre-T2DM/Hx T2DM. Presented to [facility] ED (11/26) w/ CC progressive dyspnea. Found to have acute on chronic hypoxic/hypercapnic respiratory failure (VBG: pH 7.25, pCO2 90 from baseline on ABG ) in s/o CXR w/ cardiomegaly + mild pulmonary venous congestion and covid pos. Most likely 2/2 acute decompensated HFpEF and acute covid infection.  Quickly weaned from BiPAP to NC over several hours - now stable on 6L and diuresing well.  Receiving remdesivir and dex for Covid.  Transferred to the floor for ongoing mgmt..    #Acute on chronic hypoxic/hypercapneic respiratory failure  #Covid PNA  #HFpEF (EF 55-60% 7/12/23)  ::baseline O2  requirement of 4L via NC  ::at home uses CPAP at night (does not know settings)  ::BNP 59 (basline around 88) of note patient has BMI of 59.6  ::s/p IV Lasix 60mg x2  ::patient was prescribed Lasix 20mg at home but had not initiated yet  -per charting patient is approx. net neg. 750cc  -cont. bipap at night  -daily weights  -ctm Is/Os and diurese as needed  -once back to baseline 4L consider trial of oral Lasix prescribed dose of 20mg  -remdesivir and dex first dose 11/26/2023  -daily LFTs with remdesivir  -plan for 5 days of remdesivir or less with clinical improvement  -plan for 5 days of dex pending clinical improvement     #Pre-T2DM, Hx of T2DM  ::a1c 6.2 (10/17/23)  ::holding home metformin and dilaglutide  -SSI while inpatient  -diabetic diet  -hypoglycemia protocol ordered    #HTN  #HLD  -cont. amlodipine 10mg daily  -cont. losartan 50mg daily  -cont. hctz 50mg daily  -cont. atorvastatin 20mg daily  -pt. taking aspirin 81mg daily     F: gentle given HFpEF  E: replete prn  N: diabetic diet  A: PIVs  DVT PPX: lovenox  NOK: Ysabel Hutson (mother) [phone]    CODE STATUS: FULL CODE (confirmed on admission)           Una York MD

## 2023-11-27 NOTE — NURSING NOTE
PT is being transferred to  70 via Bed. Report called to RN.  All belongings that are present in the room are bagged and labeled with PT ID and are being transported to the new PT room. PT is on O2 + ECG monitoring and RN is going with the PT

## 2023-11-27 NOTE — PROGRESS NOTES
Physical Therapy    Physical Therapy Evaluation    Patient Name: Eugene Hutson  MRN: [id number]  Today's Date: 11/27/2023   Time Calculation  Start Time: 1057  Stop Time: 1116  Time Calculation (min): 19 min    Assessment/Plan   PT Assessment  PT Assessment Results: Decreased strength, Decreased endurance, Impaired balance, Decreased safety awareness  Rehab Prognosis: Good  End of Session Communication: Bedside nurse  End of Session Patient Position: Bed, 3 rail up, Alarm off, not on at start of session  IP OR SWING BED PT PLAN  Inpatient or Swing Bed: Inpatient  PT Plan  Treatment/Interventions: Bed mobility, Transfer training, Gait training, Stair training, Balance training, Strengthening, Endurance training, Home exercise program, Therapeutic exercise  PT Plan: Skilled PT  PT Frequency: 3 times per week  PT Discharge Recommendations: Low intensity level of continued care  PT Recommended Transfer Status: Assist x1  PT - OK to Discharge: Yes      Subjective   General Visit Information:  General  Reason for Referral: Covid  Past Medical History Relevant to Rehab: PMHx most pertinent for HFpEF (EF 55-60% 7/12/2023) likely 2/2 chronic HTN, chronic hypoxic/hypercapnic respiratory failure likely 2/2 chart dx COPD versus chronic volume overload now on baseline LFNC 4 L/min, YANI now on CPAP qhs, and pre-T2DM/Hx T2DM. Presented to [facility] ED (11/26) w/ CC progressive dyspnea. Found to have acute on chronic hypoxic/hypercapnic respiratory failure (VBG: pH 7.25, pCO2 90 from baseline on ABG ) in s/o CXR w/ cardiomegaly + mild pulmonary venous congestion and covid pos. Most likely 2/2 acute decompensated HFpEF or acute covid infection versus less likely COPD exacerbation (no wheezing), ACS (troponin WNL x2), or PE (no CP, no sequelae DVT). Quickly weaned from BiPAP to NC over several hours - now stable on 6L and diuresing well, + managing COVID  Co-Treatment: OT  Co-Treatment Reason: cotx with OT for safety during  mobility and for d/c  Prior to Session Communication: Bedside nurse  Patient Position Received:  (Seated EOB upon arrival)  General Comment: Pt seated EOB upon arrival and agreeable to participate in Therapy session.  Home Living:  Home Living  Type of Home: House  Lives With: Alone  Home Layout: Two level, Bed/bath upstairs  Alternate Level Stairs-Number of Steps: ~14  Home Access: Stairs to enter without rails  Entrance Stairs-Rails: None  Bathroom Shower/Tub: Tub/shower unit  Prior Level of Function:  Prior Function Per Pt/Caregiver Report  Level of [place]: Independent with ADLs and functional transfers, Independent with homemaking with ambulation  ADL Assistance: Independent  Homemaking Assistance: Needs assistance  Ambulatory Assistance: Independent  Vocational:  (recently released from detention, not working)  Prior Function Comments: -ve drives, takes bus and da helps  Precautions:  Precautions  Medical Precautions: Fall precautions, Oxygen therapy device and L/min (Covid -19 precuations, reports 2 falls overnight prior to admission 2/2 tripping on ext cord)  Vital Signs:  Vital Signs  SpO2: 94 %    Objective   Pain:  Pain Assessment  Pain Assessment: 0-10  Pain Score: 0 - No pain  Cognition:  Cognition  Overall Cognitive Status: Within Functional Limits  Orientation Level: Oriented X4    General Assessments:                   Sensation  Light Touch: No apparent deficits    Strength  Strength Comments: BLE's WFL                 Static Sitting Balance  Static Sitting-Level of Assistance: Independent  Dynamic Sitting Balance  Dynamic Sitting-Balance:  (Independent)    Static Standing Balance  Static Standing-Level of Assistance: Close supervision  Dynamic Standing Balance  Dynamic Standing-Balance:  (SBA)  Functional Assessments:  Bed Mobility 1  Bed Mobility 1: Sitting to supine  Level of Assistance 1: Distant supervision  Bed Mobility Comments 1: HOB elevated, bedrail    Transfers  Transfer: Yes  Transfer  1  Transfer From 1: Sit to  Transfer to 1: Stand  Technique 1: Sit to stand  Transfer Level of Assistance 1: Contact guard  Transfers 2  Transfer From 2: Stand to  Transfer to 2: Sit  Technique 2: Stand to sit  Transfer Level of Assistance 2: Close supervision    Ambulation/Gait Training  Ambulation/Gait Training Performed: Yes  Ambulation/Gait Training 1  Device 1: No device  Assistance 1: Close supervision  Comments/Distance (ft) 1: 2x12 ft  Extremity/Trunk Assessments:  RLE   RLE : Within Functional Limits  LLE   LLE : Within Functional Limits  Outcome Measures:  [facility] Basic Mobility  Turning from your back to your side while in a flat bed without using bedrails: A little  Moving from lying on your back to sitting on the side of a flat bed without using bedrails: A little  Moving to and from bed to chair (including a wheelchair): A little  Standing up from a chair using your arms (e.g. wheelchair or bedside chair): A little  To walk in hospital room: A little  Climbing 3-5 steps with railing: A lot  Basic Mobility - Total Score: 17    Encounter Problems       Encounter Problems (Active)       Balance       Patient will score>/=24/28 on Tineti to demonstrate decreased falls risk (Progressing)       Start:  11/27/23    Expected End:  12/11/23               Mobility       Patient will be Lydia ascend/descend 14 steps with LRAD (Progressing)       Start:  11/27/23    Expected End:  12/11/23            Patient will be Independent with ambulation 100 ft (Progressing)       Start:  11/27/23    Expected End:  12/11/23                       Transfers       Patient will be Independent with bed mobility (Progressing)       Start:  11/27/23    Expected End:  12/11/23            Patient will be Independent with sit to stand and bed to chair transfers (Progressing)       Start:  11/27/23    Expected End:  12/11/23                   Education Documentation  Precautions, taught by Mónica Kaur PT at 11/27/2023  3:14 PM.  Learner:  Patient  Readiness: Acceptance  Method: Explanation  Response: Verbalizes Understanding    Mobility Training, taught by Mónica Kaur PT at 11/27/2023  3:14 PM.  Learner: Patient  Readiness: Acceptance  Method: Explanation  Response: Verbalizes Understanding    Education Comments  No comments found.

## 2023-11-27 NOTE — CARE PLAN
The patient's goals for the shift include      The clinical goals for the shift include patient will remain without pain for remainder of shift    Over the shift, the patient did not make progress toward the following goals. Barriers to progression include none. Recommendations to address these barriers include none.

## 2023-11-27 NOTE — CARE PLAN
Fall prevention continued, bed wheels locked, side rails up x3, bed in lowest position, bed alarm on, room door open and lights on for ease of visibility.    Skin care plan continued, site care performed, minimal linens placed underneath and PT turned every two hours and PT checked for incontinence.        PT is in Droplet + Precaution. Personal protective equipment available outside of the pt room for application prior to entry. Isolation sign posted on door. Visitors have been educated and demonstrate understanding of isolation precautions.   Patient Infection Status       Infection Onset Added Last Indicated Last Indicated By Review Planned Expiration Resolved Resolved By    COVID-19 11/25/23 11/25/23 11/25/23 Sars-CoV-2 PCR, Screen Asymptomatic 12/05/23 12/06/23

## 2023-11-27 NOTE — SIGNIFICANT EVENT
Floor Readiness Note       I, personally, evaluated Eugene Hutson prior to transfer to the floor, including reviewing all current laboratory and imaging studies. The patient remains appropriate for transfer to the floor. Bedside nurse and respiratory therapy are also in agreement of patient's readiness for the floor.     Brief summary:  Eugene Hutson is a 48 y.o. male who was admitted to the MICU on 11/25 for 9 yo M w/ PMHx most pertinent for HFpEF (EF 55-60% 7/12/2023) likely 2/2 chronic HTN, chronic hypoxic/hypercapnic respiratory failure likely 2/2 chart dx COPD versus chronic volume overload now on baseline LFNC 4 L/min, YANI now on CPAP qhs, and pre-T2DM/Hx T2DM. Presented to [facility] ED (11/26) w/ CC progressive dyspnea. Found to have acute on chronic hypoxic/hypercapnic respiratory failure (VBG: pH 7.25, pCO2 90 from baseline on ABG ) in s/o CXR w/ cardiomegaly + mild pulmonary venous congestion and covid pos. Most likely 2/2 acute decompensated HFpEF or acute covid infection versus less likely COPD exacerbation (no wheezing), ACS (troponin WNL x2), or PE (no CP, no sequelae DVT). Quickly weaned from BiPAP to NC over several hours - now stable on 6L     Updated focused Physical Exam:  Lung: Diffuse rhonchi throughout with good air movement, no wheeze   Neuro: A and 0 times 3  No change from morning exam  Pt stable for floor    Current Vital Signs:  Heart Rate: 81 (11/26/23 2200 : Trevor Holliday, RN)  BP: 119/64 (11/26/23 2200 : Trevor Holliday, RN)  Temp: 36.3 °C (97.3 °F) (11/26/23 2000 : Trevor Holliday, RN)  Resp: 17 (11/26/23 2200 : Trevor Holliday, RN)  SpO2: 90 % (11/26/23 2200 : Trevor Holliday, RN)    Relevant updates since rounds:  As above    Accepting team, Jeb, received verbal sign out and the Provider Care team/Attending has been updated. Bedside nurse will now call accepting nurse for report and patient will be transferred to [facility]    Maurice Madison MD

## 2023-11-27 NOTE — PROGRESS NOTES
Occupational Therapy    Evaluation    Patient Name: Eugene Hutson  MRN: [id number]  Today's Date: 11/27/2023  Time Calculation  Start Time: 1056  Stop Time: 1116  Time Calculation (min): 20 min    Assessment  IP OT Assessment  OT Assessment: Pt presents to OT near functional baseline, completing ADLs up in bathroom with SBA progressing to SUP, anticipate pt safe to return home with additional support of family prn.  No further acute care OT needs identified at this time, please re-consult should there be a change in pt status  Prognosis: Good  Barriers to Discharge: Inaccessible home environment  Evaluation/Treatment Tolerance: Patient tolerated treatment well  Medical Staff Made Aware: Yes  End of Session Communication: Bedside nurse  End of Session Patient Position: Bed, 3 rail up, Alarm off, not on at start of session  Plan:  No Skilled OT: Safe to return home  OT Discharge Recommendations: No further acute OT  OT - OK to Discharge: Yes    Subjective   Current Problem:  1. COVID  Referral to Primary Care      2. Acute on chronic diastolic congestive heart failure (CMS/HCC)  Transthoracic Echo (TTE) Complete    Transthoracic Echo (TTE) Complete    Referral to Advanced Heart Failure Program      3. Chronic obstructive pulmonary disease, unspecified COPD type (CMS/HCC)  Referral to Pulmonology        General:  Reason for Referral: covid  Past Medical History Relevant to Rehab: PMHx most pertinent for HFpEF (EF 55-60% 7/12/2023) likely 2/2 chronic HTN, chronic hypoxic/hypercapnic respiratory failure likely 2/2 chart dx COPD versus chronic volume overload now on baseline LFNC 4 L/min, YANI now on CPAP qhs, and pre-T2DM/Hx T2DM. Presented to [facility] ED (11/26) w/ CC progressive dyspnea. Found to have acute on chronic hypoxic/hypercapnic respiratory failure (VBG: pH 7.25, pCO2 90 from baseline on ABG ) in s/o CXR w/ cardiomegaly + mild pulmonary venous congestion and covid pos. Most likely 2/2 acute decompensated  HFpEF or acute covid infection versus less likely COPD exacerbation (no wheezing), ACS (troponin WNL x2), or PE (no CP, no sequelae DVT). Quickly weaned from BiPAP to NC over several hours - now stable on 6L and diuresing well, + managing COVID  Prior to Session Communication: Bedside nurse  Patient Position Received: Bed, 3 rail up (EOB)  General Comment: Pt met EOB, willing to participate, presenting near functional baseline   Precautions:  Medical Precautions: Fall precautions, Oxygen therapy device and L/min  Vital Signs:  SpO2: 94 %      Objective   Cognition:  Overall Cognitive Status: Within Functional Limits  Orientation Level: Oriented X4           Home Living:  Type of Home: House  Lives With: Alone  Home Adaptive Equipment:  (oxygen 4L)  Home Layout: Two level, Bed/bath upstairs  Home Access: Stairs to enter without rails  Bathroom Shower/Tub: Tub/shower unit  Home Living Comments: reports 2 falls overnight prior to admission 2/2 tripping on ext cord   Prior Function:  Level of Liberty: Independent with ADLs and functional transfers  ADL Assistance: Independent  Homemaking Assistance: Independent  Ambulatory Assistance: Independent  Vocational:  (recently released from penitentiary, not working)  Prior Function Comments: -drive, takes bus or dad helps  IADL History:  Homemaking Responsibilities: Yes  ADL:  Grooming Assistance:  (SUP-hand hygiene standing sinkside)  Toileting Assistance with Device:  (SUP standing at toilet)  Activity Tolerance:  Endurance: Tolerates 10 - 20 min exercise with multiple rests  Bed Mobility/Transfers: Bed Mobility  Bed Mobility: Yes  Bed Mobility 1  Bed Mobility 1: Sitting to supine  Level of Assistance 1: Distant supervision  Bed Mobility Comments 1: HOB raised, use of bedrails   and Transfers  Transfer: Yes  Transfer 1  Technique 1: Sit to stand  Transfer Level of Assistance 1: Contact guard  Trials/Comments 1: from EOB  Transfers 2  Technique 2: Stand to sit  Transfer Level  of Assistance 2: Close supervision       IADL's:   Homemaking Responsibilities: Yes  Vision: Vision - Basic Assessment  Current Vision: No visual deficits   and    Sensation:  Light Touch: No apparent deficits  Strength:  Strength Comments: DAVIE VIDAL  Perception:  Inattention/Neglect: Appears intact  Coordination:  Movements are Fluid and Coordinated: Yes   Hand Function:  Hand Function  Gross Grasp: Functional  Coordination: Functional  Extremities: RUE   RUE : Within Functional Limits, LUE   LUE: Within Functional Limits,     Outcome Measures: [facility] Daily Activity  Putting on and taking off regular lower body clothing: A little  Bathing (including washing, rinsing, drying): A little  Putting on and taking off regular upper body clothing: A little  Toileting, which includes using toilet, bedpan or urinal: A little  Taking care of personal grooming such as brushing teeth: None  Eating Meals: None  Daily Activity - Total Score: 20         ,          Education Documentation  Precautions, taught by Jasmina Matthews OT at 11/27/2023  2:36 PM.  Learner: Patient  Readiness: Acceptance  Method: Explanation  Response: Verbalizes Understanding    ADL Training, taught by Jasmina Matthews OT at 11/27/2023  2:36 PM.  Learner: Patient  Readiness: Acceptance  Method: Explanation  Response: Verbalizes Understanding    Education Comments  No comments found.          11/27/23 at 2:44 PM   Jasmina Matthews OT   Rehab Office: [phone]

## 2023-11-27 NOTE — SIGNIFICANT EVENT
Mr. Eugene Hutson is a 49 yo M w/ PMHx most pertinent for HFpEF (EF 55-60% 7/12/2023) likely 2/2 chronic HTN, chronic hypoxic/hypercapnic respiratory failure likely 2/2 chart dx COPD versus chronic volume overload now on baseline LFNC 4 L/min, YANI now on CPAP qhs, and pre-T2DM/Hx T2DM. Presented to [facility] ED (11/26) w/ CC progressive dyspnea. Found to have acute on chronic hypoxic/hypercapnic respiratory failure (VBG: pH 7.25, pCO2 90 from baseline on ABG ) in s/o CXR w/ cardiomegaly + mild pulmonary venous congestion and covid pos. Most likely 2/2 acute decompensated HFpEF or acute covid infection versus less likely COPD exacerbation (no wheezing), ACS (troponin WNL x2), or PE (no CP, no sequelae DVT). Quickly weaned from BiPAP to NC over several hours - now stable on 6L and diuresing well, + managing COVID. Pt was transferred form the MICU to cardiology today     Updates 11/27  - FU ECHO   - LFT  - Reasses diuretics after assessing output    #Acute decompensated HFpEF  -likely 2/2 chronic HTN  -last TTE (7/12/2023) w/ EF 55-60%, severely increased LV septal thickness, moderately enlarged RV w/ mildly reduced RV systolic function, severely dilated IVC  -seemingly not on home diuresis 2/2 issue with medication access  -BNP 59 in s/o BMI 61 (baseline 88), Hx HFpEF not HFrEF  -f/u TTE today  -Pt received 120mg lasix in MICU followed by 60mg this AM. Will reassess the fluid status and the need for additional diuresis    # Acute on chronic hypoxic/hypercapnic respiratory failure  -chronic likely 2/2 chart dx COPD +/- chronic volume overload (baseline: LFNC 4 L/min, HCO3 >40, ABG pCO2 )  -acute likely 2/2 ADHF versus covid versus COPD exacerbation (however no wheezing on exam)  -no PFTs in EPIC  -on admission, BPAP 22/5, now weaning to daytime LFNC 4 L/min  - Will need BiPAP qhs for YANI/chronic hypercapnia  -pt not on home inhalers  -s/p methylprednisolone in ED. Follow-up s.glu     # YANI  -adherent to  auto-CPAP at bedtime at home  -Will switch to BiPAP qhs d/t chronic hypercapnia     #Covid infection (pos 11/25)  -no evidence pneumonia on CXR  -acute on chronic hypoxic respiratory failure unclear if purely d/t overload or some contribution from covid  -high-risk comorbidities including HF, morbid obesity  -started remdesivir x5 days pending clinical improvement (*daily LFTs on remdesivir)  -start dexamethasone 6mg PO x5 days pending clinical improvement (*q4h SS, PPI while on CS)    #HTN  -continue amlodipine 10mg PO daily  -continue losartan 50mg PO daily (Cr at baseline)  -continue hydrochlorothiazide 50mg PO daily     #HLD  -continue atorvastatin 20mg PO daily     #Pre-T2DM, Hx T2DM  -A1c 6.2 (10/17/2023), no indication to recheck  -hold home metformin, dulaglutide while admitted  -start lispo SS q4h (while on CS), diabetic diet (when off NIPAP), hypoglycemia protocol    Diet: Diabetic diet  Electrolytes: K >4; Mg >2  DVT ppx: enoxaparin  GI ppx: pantoprazole (while on CS)  Lines/tubes: PIV x3  Code status: full  Surrogate decision maker: Ysabel Hutson (mother) [phone]    Pt seen and discussed with Dr. Thom Fair  PGY-1  Anesthesiology

## 2023-11-28 ENCOUNTER — APPOINTMENT (OUTPATIENT)
Dept: CARDIOLOGY | Facility: HOSPITAL | Age: 48
DRG: 177 | End: 2023-11-28
Payer: MEDICARE

## 2023-11-28 VITALS
TEMPERATURE: 97 F | HEIGHT: 76 IN | RESPIRATION RATE: 17 BRPM | SYSTOLIC BLOOD PRESSURE: 155 MMHG | BODY MASS INDEX: 38.36 KG/M2 | WEIGHT: 315 LBS | OXYGEN SATURATION: 94 % | HEART RATE: 76 BPM | DIASTOLIC BLOOD PRESSURE: 90 MMHG

## 2023-11-28 LAB
ALBUMIN SERPL BCP-MCNC: 3.1 G/DL (ref 3.4–5)
ALP SERPL-CCNC: 53 U/L (ref 33–120)
ALT SERPL W P-5'-P-CCNC: 12 U/L (ref 10–52)
ANION GAP SERPL CALC-SCNC: 10 MMOL/L (ref 10–20)
AORTIC VALVE PEAK VELOCITY: 1.21
AST SERPL W P-5'-P-CCNC: 12 U/L (ref 9–39)
AV PEAK GRADIENT: 5.9
AVA (PEAK VEL): 3.29
BASOPHILS # BLD AUTO: 0.02 X10*3/UL (ref 0–0.1)
BASOPHILS NFR BLD AUTO: 0.3 %
BILIRUB DIRECT SERPL-MCNC: 0.1 MG/DL (ref 0–0.3)
BILIRUB SERPL-MCNC: 0.6 MG/DL (ref 0–1.2)
BUN SERPL-MCNC: 28 MG/DL (ref 6–23)
CALCIUM SERPL-MCNC: 8.3 MG/DL (ref 8.6–10.6)
CHLORIDE SERPL-SCNC: 94 MMOL/L (ref 98–107)
CO2 SERPL-SCNC: 43 MMOL/L (ref 21–32)
CREAT SERPL-MCNC: 1.03 MG/DL (ref 0.5–1.3)
EOSINOPHIL # BLD AUTO: 0.06 X10*3/UL (ref 0–0.7)
EOSINOPHIL NFR BLD AUTO: 1 %
ERYTHROCYTE [DISTWIDTH] IN BLOOD BY AUTOMATED COUNT: 12.8 % (ref 11.5–14.5)
GFR SERPL CREATININE-BSD FRML MDRD: 90 ML/MIN/1.73M*2
GLUCOSE BLD MANUAL STRIP-MCNC: 165 MG/DL (ref 74–99)
GLUCOSE BLD MANUAL STRIP-MCNC: 227 MG/DL (ref 74–99)
GLUCOSE BLD MANUAL STRIP-MCNC: 268 MG/DL (ref 74–99)
GLUCOSE SERPL-MCNC: 167 MG/DL (ref 74–99)
HCT VFR BLD AUTO: 53.2 % (ref 41–52)
HGB BLD-MCNC: 16.2 G/DL (ref 13.5–17.5)
IMM GRANULOCYTES # BLD AUTO: 0.02 X10*3/UL (ref 0–0.7)
IMM GRANULOCYTES NFR BLD AUTO: 0.3 % (ref 0–0.9)
LEFT VENTRICLE INTERNAL DIMENSION DIASTOLE: 4.98 (ref 3.5–6)
LEFT VENTRICULAR OUTFLOW TRACT DIAMETER: 2.4
LYMPHOCYTES # BLD AUTO: 2.42 X10*3/UL (ref 1.2–4.8)
LYMPHOCYTES NFR BLD AUTO: 39.6 %
MAGNESIUM SERPL-MCNC: 1.88 MG/DL (ref 1.6–2.4)
MCH RBC QN AUTO: 32.1 PG (ref 26–34)
MCHC RBC AUTO-ENTMCNC: 30.5 G/DL (ref 32–36)
MCV RBC AUTO: 105 FL (ref 80–100)
MITRAL VALVE E/A RATIO: 1.07
MITRAL VALVE E/E' RATIO: 10.01
MONOCYTES # BLD AUTO: 0.56 X10*3/UL (ref 0.1–1)
MONOCYTES NFR BLD AUTO: 9.2 %
NEUTROPHILS # BLD AUTO: 3.03 X10*3/UL (ref 1.2–7.7)
NEUTROPHILS NFR BLD AUTO: 49.6 %
NRBC BLD-RTO: 0 /100 WBCS (ref 0–0)
PHOSPHATE SERPL-MCNC: 4.7 MG/DL (ref 2.5–4.9)
PLATELET # BLD AUTO: 207 X10*3/UL (ref 150–450)
POTASSIUM SERPL-SCNC: 4.2 MMOL/L (ref 3.5–5.3)
PROT SERPL-MCNC: 5.6 G/DL (ref 6.4–8.2)
RBC # BLD AUTO: 5.05 X10*6/UL (ref 4.5–5.9)
RIGHT VENTRICLE FREE WALL PEAK S': 15.9
SODIUM SERPL-SCNC: 143 MMOL/L (ref 136–145)
TRICUSPID ANNULAR PLANE SYSTOLIC EXCURSION: 1.8
WBC # BLD AUTO: 6.1 X10*3/UL (ref 4.4–11.3)

## 2023-11-28 PROCEDURE — 82947 ASSAY GLUCOSE BLOOD QUANT: CPT

## 2023-11-28 PROCEDURE — 2500000001 HC RX 250 WO HCPCS SELF ADMINISTERED DRUGS (ALT 637 FOR MEDICARE OP): Performed by: STUDENT IN AN ORGANIZED HEALTH CARE EDUCATION/TRAINING PROGRAM

## 2023-11-28 PROCEDURE — 94660 CPAP INITIATION&MGMT: CPT

## 2023-11-28 PROCEDURE — 85025 COMPLETE CBC W/AUTO DIFF WBC: CPT

## 2023-11-28 PROCEDURE — 2500000004 HC RX 250 GENERAL PHARMACY W/ HCPCS (ALT 636 FOR OP/ED): Performed by: STUDENT IN AN ORGANIZED HEALTH CARE EDUCATION/TRAINING PROGRAM

## 2023-11-28 PROCEDURE — 93306 TTE W/DOPPLER COMPLETE: CPT

## 2023-11-28 PROCEDURE — 93306 TTE W/DOPPLER COMPLETE: CPT | Performed by: INTERNAL MEDICINE

## 2023-11-28 PROCEDURE — 96372 THER/PROPH/DIAG INJ SC/IM: CPT | Performed by: STUDENT IN AN ORGANIZED HEALTH CARE EDUCATION/TRAINING PROGRAM

## 2023-11-28 PROCEDURE — 2500000004 HC RX 250 GENERAL PHARMACY W/ HCPCS (ALT 636 FOR OP/ED)

## 2023-11-28 PROCEDURE — 83735 ASSAY OF MAGNESIUM: CPT

## 2023-11-28 PROCEDURE — 2500000005 HC RX 250 GENERAL PHARMACY W/O HCPCS: Performed by: STUDENT IN AN ORGANIZED HEALTH CARE EDUCATION/TRAINING PROGRAM

## 2023-11-28 PROCEDURE — 82248 BILIRUBIN DIRECT: CPT

## 2023-11-28 PROCEDURE — 99239 HOSP IP/OBS DSCHRG MGMT >30: CPT

## 2023-11-28 PROCEDURE — 36415 COLL VENOUS BLD VENIPUNCTURE: CPT

## 2023-11-28 PROCEDURE — 84100 ASSAY OF PHOSPHORUS: CPT

## 2023-11-28 RX ORDER — FUROSEMIDE 20 MG/1
40 TABLET ORAL DAILY
Qty: 60 TABLET | Refills: 1 | Status: SHIPPED | OUTPATIENT
Start: 2023-11-28 | End: 2024-01-27

## 2023-11-28 RX ORDER — FUROSEMIDE 20 MG/1
40 TABLET ORAL DAILY
Qty: 60 TABLET | Refills: 1 | Status: SHIPPED | OUTPATIENT
Start: 2023-11-28 | End: 2023-11-28 | Stop reason: SDUPTHER

## 2023-11-28 RX ADMIN — PERFLUTREN 10 ML OF DILUTION: 6.52 INJECTION, SUSPENSION INTRAVENOUS at 14:06

## 2023-11-28 RX ADMIN — ENOXAPARIN SODIUM 60 MG: 100 INJECTION SUBCUTANEOUS at 15:06

## 2023-11-28 RX ADMIN — INSULIN LISPRO 3 UNITS: 100 INJECTION, SOLUTION INTRAVENOUS; SUBCUTANEOUS at 10:44

## 2023-11-28 RX ADMIN — PANTOPRAZOLE SODIUM 40 MG: 40 TABLET, DELAYED RELEASE ORAL at 06:17

## 2023-11-28 RX ADMIN — ENOXAPARIN SODIUM 60 MG: 100 INJECTION SUBCUTANEOUS at 01:16

## 2023-11-28 RX ADMIN — LOSARTAN POTASSIUM 50 MG: 50 TABLET, FILM COATED ORAL at 10:43

## 2023-11-28 RX ADMIN — REMDESIVIR 100 MG: 100 INJECTION, POWDER, LYOPHILIZED, FOR SOLUTION INTRAVENOUS at 01:16

## 2023-11-28 RX ADMIN — DEXAMETHASONE 6 MG: 6 TABLET ORAL at 10:43

## 2023-11-28 RX ADMIN — INSULIN LISPRO 6 UNITS: 100 INJECTION, SOLUTION INTRAVENOUS; SUBCUTANEOUS at 15:06

## 2023-11-28 RX ADMIN — INSULIN LISPRO 9 UNITS: 100 INJECTION, SOLUTION INTRAVENOUS; SUBCUTANEOUS at 17:50

## 2023-11-28 RX ADMIN — HYDROCHLOROTHIAZIDE 50 MG: 25 TABLET ORAL at 10:43

## 2023-11-28 RX ADMIN — AMLODIPINE BESYLATE 10 MG: 10 TABLET ORAL at 10:43

## 2023-11-28 RX ADMIN — ASPIRIN 81 MG CHEWABLE TABLET 81 MG: 81 TABLET CHEWABLE at 10:43

## 2023-11-28 ASSESSMENT — COGNITIVE AND FUNCTIONAL STATUS - GENERAL
CLIMB 3 TO 5 STEPS WITH RAILING: A LOT
DAILY ACTIVITIY SCORE: 24
MOBILITY SCORE: 22

## 2023-11-28 ASSESSMENT — PAIN SCALES - GENERAL: PAINLEVEL_OUTOF10: 0 - NO PAIN

## 2023-11-28 NOTE — PROGRESS NOTES
11/28/2023 Care coordination  Discussed PT recs for Home care. Pt declined.  I  confirmed that he has home 02 and in working order  Does not recall supplier.  Team aware,

## 2023-11-28 NOTE — CARE PLAN
The patient's goals for the shift include      The clinical goals for the shift include patient will stay on 4L for remainder of shift    Over the shift, the patient did not make progress toward the following goals. Barriers to progression include  none. Recommendations to address these barriers include none.

## 2023-11-28 NOTE — PROGRESS NOTES
11/28/2023 Care coordination  47 yo M w/ PMHx most pertinent for HFpEF (EF 55-60% 7/12/2023) likely 2/2 chronic HTN, YANI on cpap at night, chronic hypoxic/hypercapnic respiratory failure on 4L NC at baseline who presented with acute on chronic hypoxic/hypercapnic respiratory requiring micu admission for BIPAP.  Found to be covid positive.  As well concern for acute decompensated heart failure. .  PT recs Home PT.   Team will initiate referral.  Pt states he has 02 at home, does not recall the supplier.

## 2023-11-28 NOTE — PROGRESS NOTES
"Eugene Hutson is a 48 y.o. male on day 3 of admission w/ PMHx most pertinent for HFpEF (EF 55-60% 7/12/2023) likely 2/2 chronic HTN, YANI on cpap at night, chronic hypoxic/hypercapnic respiratory failure on 4L NC at baseline who presented with acute on chronic hypoxic/hypercapnic respiratory with COVID.    Received IV diuresis in the ED.  In the MICU he was started on remdesivir and dexamethasone.  He was shortly weaned off BIPAP in the MICU down to 6L NC.  Received in additional 60mg of IV Lasix in the MICU with good response.      Subjective   Patient was walking around comfortably. States he is feeling much better from when he first arrived.         Objective     Physical Exam  Constitutional:       General: He is not in acute distress.     Appearance: He is obese.   HENT:      Head: Normocephalic and atraumatic.      Mouth/Throat:      Mouth: Mucous membranes are moist.   Eyes:      Extraocular Movements: Extraocular movements intact.      Conjunctiva/sclera: Conjunctivae normal.   Cardiovascular:      Rate and Rhythm: Normal rate and regular rhythm.      Heart sounds: No murmur heard.     No friction rub. No gallop.   Pulmonary:      Effort: Pulmonary effort is normal. No respiratory distress.      Breath sounds: No wheezing.   Abdominal:      General: Bowel sounds are normal. There is no distension.      Palpations: Abdomen is soft.      Tenderness: There is no abdominal tenderness.   Musculoskeletal:         General: Normal range of motion.      Cervical back: Normal range of motion.      Right lower leg: No edema.      Left lower leg: Edema present.      Comments: Pitting edema in left foot and below knee   Skin:     General: Skin is warm.   Neurological:      General: No focal deficit present.      Mental Status: He is alert.   Psychiatric:         Mood and Affect: Mood normal.         Last Recorded Vitals  Blood pressure 155/90, pulse 76, temperature 36.1 °C (97 °F), resp. rate 17, height 1.93 m (6' 4\"), " weight (!) 221 kg (486 lb 8 oz), SpO2 94 %.  Intake/Output last 3 Shifts:  I/O last 3 completed shifts:  In: 310 (1.4 mL/kg) [P.O.:300; I.V.:10 (0 mL/kg)]  Out: 2050 (9.3 mL/kg) [Urine:2050 (0.3 mL/kg/hr)]  Weight: 220.7 kg     Relevant Results  Results for orders placed or performed during the hospital encounter of 11/25/23 (from the past 24 hour(s))   POCT GLUCOSE   Result Value Ref Range    POCT Glucose 446 (H) 74 - 99 mg/dL   POCT GLUCOSE   Result Value Ref Range    POCT Glucose 184 (H) 74 - 99 mg/dL   CBC and Auto Differential   Result Value Ref Range    WBC 6.1 4.4 - 11.3 x10*3/uL    nRBC 0.0 0.0 - 0.0 /100 WBCs    RBC 5.05 4.50 - 5.90 x10*6/uL    Hemoglobin 16.2 13.5 - 17.5 g/dL    Hematocrit 53.2 (H) 41.0 - 52.0 %     (H) 80 - 100 fL    MCH 32.1 26.0 - 34.0 pg    MCHC 30.5 (L) 32.0 - 36.0 g/dL    RDW 12.8 11.5 - 14.5 %    Platelets 207 150 - 450 x10*3/uL    Neutrophils % 49.6 40.0 - 80.0 %    Immature Granulocytes %, Automated 0.3 0.0 - 0.9 %    Lymphocytes % 39.6 13.0 - 44.0 %    Monocytes % 9.2 2.0 - 10.0 %    Eosinophils % 1.0 0.0 - 6.0 %    Basophils % 0.3 0.0 - 2.0 %    Neutrophils Absolute 3.03 1.20 - 7.70 x10*3/uL    Immature Granulocytes Absolute, Automated 0.02 0.00 - 0.70 x10*3/uL    Lymphocytes Absolute 2.42 1.20 - 4.80 x10*3/uL    Monocytes Absolute 0.56 0.10 - 1.00 x10*3/uL    Eosinophils Absolute 0.06 0.00 - 0.70 x10*3/uL    Basophils Absolute 0.02 0.00 - 0.10 x10*3/uL   Hepatic Function Panel   Result Value Ref Range    Albumin 3.1 (L) 3.4 - 5.0 g/dL    Bilirubin, Total 0.6 0.0 - 1.2 mg/dL    Bilirubin, Direct 0.1 0.0 - 0.3 mg/dL    Alkaline Phosphatase 53 33 - 120 U/L    ALT 12 10 - 52 U/L    AST 12 9 - 39 U/L    Total Protein 5.6 (L) 6.4 - 8.2 g/dL   Magnesium   Result Value Ref Range    Magnesium 1.88 1.60 - 2.40 mg/dL   Phosphorus   Result Value Ref Range    Phosphorus 4.7 2.5 - 4.9 mg/dL   Basic Metabolic Panel   Result Value Ref Range    Glucose 167 (H) 74 - 99 mg/dL    Sodium 143  136 - 145 mmol/L    Potassium 4.2 3.5 - 5.3 mmol/L    Chloride 94 (L) 98 - 107 mmol/L    Bicarbonate 43 (HH) 21 - 32 mmol/L    Anion Gap 10 10 - 20 mmol/L    Urea Nitrogen 28 (H) 6 - 23 mg/dL    Creatinine 1.03 0.50 - 1.30 mg/dL    eGFR 90 >60 mL/min/1.73m*2    Calcium 8.3 (L) 8.6 - 10.6 mg/dL   POCT GLUCOSE   Result Value Ref Range    POCT Glucose 165 (H) 74 - 99 mg/dL   Transthoracic Echo (TTE) Complete   Result Value Ref Range    BSA 3.44 m2   POCT GLUCOSE   Result Value Ref Range    POCT Glucose 227 (H) 74 - 99 mg/dL      ECG 12 lead  Result Date: 11/26/2023  Sinus tachycardia Otherwise normal ECG When compared with ECG of 11-JUL-2023 08:53, No significant change was found See ED provider note for full interpretation and clinical correlation Confirmed by Mecca Simons ([id number]) on 11/26/2023 1:40:37 AM    XR chest 1 view  Result Date: 11/25/2023     1. Cardiomegaly with suggestion of mild pulmonary venous congestion similar to prior.     Signed by: Lamin Allen 11/25/2023 7:46 PM Dictation workstation:   [id number]      Assessment/Plan   Principal Problem:    COVID    Mr. Eugene Hutson is a 47 yo M w/ PMHx most pertinent for HFpEF (EF 55-60% 7/12/2023) likely 2/2 chronic HTN, chronic hypoxic/hypercapnic respiratory failure likely 2/2 chart dx COPD versus chronic volume overload now on baseline LFNC 4 L/min, YANI now on CPAP qhs, and pre-T2DM/Hx T2DM. Presented to [facility] ED (11/26) w/ CC progressive dyspnea. Found to have acute on chronic hypoxic/hypercapnic respiratory failure (VBG: pH 7.25, pCO2 90 from baseline on ABG ) in s/o CXR w/ cardiomegaly + mild pulmonary venous congestion and covid pos. Most likely 2/2 acute decompensated HFpEF and acute covid infection.  Quickly weaned from BiPAP to NC over several hours - now stable on 6L and diuresing well.  Receiving remdesivir and dex for Covid.  Transferred to the floor for ongoing mgmt.    Updates 11/28  - FU ECHO   - LFT daily  - Reasses diuretics after  assessing output  - Walking pulse ox  - Check decadron dose     #Acute decompensated HFpEF  -likely 2/2 chronic HTN  -last TTE (7/12/2023) w/ EF 55-60%, severely increased LV septal thickness, moderately enlarged RV w/ mildly reduced RV systolic function, severely dilated IVC  -seemingly not on home diuresis 2/2 issue with medication access  -BNP 59 in s/o BMI 61 (baseline 88), Hx HFpEF not HFrEF  -f/u TTE awaited  -Pt to be discharged on 40mg PO Lasix.      # Acute on chronic hypoxic/hypercapnic respiratory failure  -chronic likely 2/2 chart dx COPD +/- chronic volume overload (baseline: LFNC 4 L/min, HCO3 >40, ABG pCO2 )  -acute likely 2/2 ADHF versus covid versus COPD exacerbation (however no wheezing on exam)  -no PFTs in EPIC  -on admission, BPAP 22/5, now weaning to daytime LFNC 4 L/min  - Will need BiPAP qhs for YANI/chronic hypercapnia  -pt not on home inhalers  -s/p methylprednisolone in ED. Follow-up s.glu     # YANI  -adherent to auto-CPAP at bedtime at home  - Chronic retainer  -Will switch to BiPAP qhs d/t chronic hypercapnia     #Covid infection (pos 11/25)  -no evidence pneumonia on CXR  -acute on chronic hypoxic respiratory failure unclear if purely d/t overload or some contribution from covid  -high-risk comorbidities including HF, morbid obesity  -started remdesivir x5 days pending clinical improvement (*daily LFTs on remdesivir)  -start dexamethasone 6mg PO x5 days pending clinical improvement (*q4h SS, PPI while on CS)     #HTN  -continue amlodipine 10mg PO daily  -continue losartan 50mg PO daily (Cr at baseline)  -continue hydrochlorothiazide 50mg PO daily     #HLD  -continue atorvastatin 20mg PO daily     #Pre-T2DM, Hx T2DM  -A1c 6.2 (10/17/2023), no indication to recheck  -hold home metformin, dulaglutide while admitted  -start lispo SS q4h (while on CS), diabetic diet (when off NIPAP), hypoglycemia protocol     Diet: Diabetic diet  Electrolytes: K >4; Mg >2  DVT ppx: enoxaparin  GI ppx:  pantoprazole (while on CS)  Lines/tubes: PIV x3  Code status: full  Surrogate decision maker: Ysabel Hutson (mother) [phone]     Pt seen and discussed with Dr. Thom Fair MD  PGY-1  Anesthesiology

## 2023-11-28 NOTE — CARE PLAN
The patient's goals for the shift include  rest comfortably     The clinical goals for the shift include Spo2 above 95%    Problem: Pain  Goal: My pain/discomfort is manageable  Outcome: Progressing     Problem: Safety  Goal: Patient will be injury free during hospitalization  Outcome: Progressing  Goal: I will remain free of falls  Outcome: Progressing     Problem: Daily Care  Goal: Daily care needs are met  Outcome: Progressing     Problem: Psychosocial Needs  Goal: Demonstrates ability to cope with hospitalization/illness  Outcome: Progressing  Goal: Collaborate with me, my family, and caregiver to identify my specific goals  Outcome: Progressing     Problem: Discharge Barriers  Goal: My discharge needs are met  Outcome: Progressing     Problem: Fall/Injury  Goal: Not fall by end of shift  Outcome: Progressing  Goal: Be free from injury by end of the shift  Outcome: Progressing  Goal: Verbalize understanding of personal risk factors for fall in the hospital  Outcome: Progressing  Goal: Verbalize understanding of risk factor reduction measures to prevent injury from fall in the home  Outcome: Progressing  Goal: Use assistive devices by end of the shift  Outcome: Progressing  Goal: Pace activities to prevent fatigue by end of the shift  Outcome: Progressing     Problem: Skin  Goal: Decreased wound size/increased tissue granulation at next dressing change  Outcome: Progressing  Goal: Participates in plan/prevention/treatment measures  Outcome: Progressing  Goal: Prevent/manage excess moisture  Outcome: Progressing  Goal: Prevent/minimize sheer/friction injuries  Outcome: Progressing  Goal: Promote/optimize nutrition  Outcome: Progressing  Goal: Promote skin healing  Outcome: Progressing     Problem: Pain  Goal: Takes deep breaths with improved pain control throughout the shift  Outcome: Progressing  Goal: Turns in bed with improved pain control throughout the shift  Outcome: Progressing  Goal: Walks with improved  pain control throughout the shift  Outcome: Progressing  Goal: Performs ADL's with improved pain control throughout shift  Outcome: Progressing  Goal: Participates in PT with improved pain control throughout the shift  Outcome: Progressing  Goal: Free from opioid side effects throughout the shift  Outcome: Progressing  Goal: Free from acute confusion related to pain meds throughout the shift  Outcome: Progressing     Problem: Isolation  Goal: No cross contamination to visitors, employees, or other residents  Outcome: Progressing

## 2023-11-29 NOTE — DISCHARGE SUMMARY
Discharge Diagnosis  COVID    Issues Requiring Follow-Up  [facility] cardiology/[facility] failure clinic    Test Results Pending At Discharge  Pending Labs       No current pending labs.            Hospital Course  Mr. Eugene Hutson is a 49 yo M w/ PMHx most pertinent for HFpEF (EF 55-60% 7/12/2023) likely 2/2 chronic HTN, chronic hypoxic/hypercapnic respiratory failure likely 2/2 chart dx COPD versus chronic volume overload now on baseline LFNC 4 L/min, YANI now on CPAP qhs, and pre-T2DM/Hx T2DM. Presented to [facility] ED (11/26) w/ CC progressive dyspnea. Found to have acute on chronic hypoxic/hypercapnic respiratory failure (VBG: pH 7.25, pCO2 90 from baseline on ABG ) in s/o CXR w/ cardiomegaly + mild pulmonary venous congestion and covid pos. Most likely 2/2 acute decompensated HFpEF or acute covid infection versus less likely COPD exacerbation (no wheezing), ACS (troponin WNL x2), or PE (no CP, no sequelae DVT). Received 125 of IV methylpred and duonebs in the ED, and began aggressive diuresis, with good UOP. Initially placed on BiPAP, weaned to NC after several hours. Course of dexamethasone and remdesivir started for COVID. Tolerating diet and on 6L NC (from baseline of 4L) on transfer to the floor. Pt improved with treatment. States his symptoms are better. He is back to his baseline oxygen requiremnet. Will increased his lasix from 20mg to 40mg on discharge.  Pt has an established care at [facility].    Pertinent Physical Exam At Time of Discharge  Physical Exam  Vitals reviewed.   Constitutional:       General: He is not in acute distress.     Appearance: He is obese.   HENT:      Head: Normocephalic and atraumatic.   Eyes:      Extraocular Movements: Extraocular movements intact.      Pupils: Pupils are equal, round, and reactive to light.   Cardiovascular:      Rate and Rhythm: Normal rate and regular rhythm.   Pulmonary:      Effort: Pulmonary effort is normal.      Breath sounds: Normal breath sounds.    Abdominal:      Palpations: Abdomen is soft.   Musculoskeletal:         General: Normal range of motion.   Neurological:      General: No focal deficit present.      Mental Status: He is alert and oriented to person, place, and time.   Psychiatric:         Mood and Affect: Mood normal.         Behavior: Behavior normal.         Home Medications     Medication List      CHANGE how you take these medications     furosemide 20 mg tablet; Commonly known as: Lasix; Take 2 tablets (40   mg) by mouth once daily.; What changed: how much to take, when to take   this     CONTINUE taking these medications     albuterol 90 mcg/actuation inhaler   amLODIPine 10 mg tablet; Commonly known as: Norvasc   aspirin 81 mg EC tablet   atorvastatin 20 mg tablet; Commonly known as: Lipitor   carBAMazepine 100 mg chewable tablet; Commonly known as: TEGretol   diclofenac sodium 1 % gel gel; Commonly known as: Voltaren   dulaglutide 0.75 mg/0.5 mL pen injector; Commonly known as: Trulicity   ergocalciferol 1.25 MG (47018 UT) capsule; Commonly known as: Vitamin   D-2   hydroCHLOROthiazide 50 mg tablet; Commonly known as: HYDRODiuril; TAKE 1   TABLET BY MOUTH ONCE DAILY   losartan 50 mg tablet; Commonly known as: Cozaar   metFORMIN 1,000 mg tablet; Commonly known as: Glucophage   polyethylene glycol 17 gram packet; Commonly known as: Glycolax, Miralax   sennosides 8.6 mg tablet; Commonly known as: Senokot       Outpatient Follow-Up  No future appointments.    Ria Fair MD

## 2023-11-29 NOTE — DISCHARGE INSTRUCTIONS
Mr. Hutson,   You were admitted due to fluid overload, which required an escalation in your oxygen requirements while Covid positive. For this, we treated you with diuretics (medications to help you pee the fluid off) which improved your oxygenation back to baseline. In addition, we treated your Covid with multiple days of remdesivir and dexamethasone. We also obtained a repeat ultrasound of your heart, which shows stable heart function.      Medication changes  -We have changed your home Lasix 20mg PO daily to 40mg PO daily     Please take your medications as prescribed, and continue to follow up at your appointments.

## 2024-04-05 ENCOUNTER — CLINICAL SUPPORT (OUTPATIENT)
Dept: EMERGENCY MEDICINE | Facility: HOSPITAL | Age: 49
End: 2024-04-05
Payer: COMMERCIAL

## 2024-04-05 ENCOUNTER — HOSPITAL ENCOUNTER (EMERGENCY)
Facility: HOSPITAL | Age: 49
Discharge: HOME | End: 2024-04-05
Attending: EMERGENCY MEDICINE
Payer: COMMERCIAL

## 2024-04-05 ENCOUNTER — APPOINTMENT (OUTPATIENT)
Dept: RADIOLOGY | Facility: HOSPITAL | Age: 49
End: 2024-04-05
Payer: COMMERCIAL

## 2024-04-05 VITALS
SYSTOLIC BLOOD PRESSURE: 142 MMHG | RESPIRATION RATE: 19 BRPM | OXYGEN SATURATION: 94 % | TEMPERATURE: 96.5 F | DIASTOLIC BLOOD PRESSURE: 89 MMHG | HEART RATE: 98 BPM

## 2024-04-05 DIAGNOSIS — J44.1 COPD WITH ACUTE EXACERBATION (MULTI): ICD-10-CM

## 2024-04-05 DIAGNOSIS — J18.9 PNEUMONIA DUE TO INFECTIOUS ORGANISM, UNSPECIFIED LATERALITY, UNSPECIFIED PART OF LUNG: Primary | ICD-10-CM

## 2024-04-05 DIAGNOSIS — J02.0 STREP PHARYNGITIS: ICD-10-CM

## 2024-04-05 LAB
ALBUMIN SERPL BCP-MCNC: 3.8 G/DL (ref 3.4–5)
ALP SERPL-CCNC: 78 U/L (ref 33–120)
ALT SERPL W P-5'-P-CCNC: 10 U/L (ref 10–52)
ANION GAP BLDV CALCULATED.4IONS-SCNC: -2 MMOL/L (ref 10–25)
ANION GAP SERPL CALC-SCNC: 12 MMOL/L (ref 10–20)
AST SERPL W P-5'-P-CCNC: 12 U/L (ref 9–39)
BASE EXCESS BLDV CALC-SCNC: 13 MMOL/L (ref -2–3)
BASOPHILS # BLD AUTO: 0.02 X10*3/UL (ref 0–0.1)
BASOPHILS NFR BLD AUTO: 0.2 %
BILIRUB SERPL-MCNC: 1.2 MG/DL (ref 0–1.2)
BNP SERPL-MCNC: 5 PG/ML (ref 0–99)
BODY TEMPERATURE: 37 DEGREES CELSIUS
BUN SERPL-MCNC: 8 MG/DL (ref 6–23)
CA-I BLDV-SCNC: 1.1 MMOL/L (ref 1.1–1.33)
CALCIUM SERPL-MCNC: 8.7 MG/DL (ref 8.6–10.6)
CARDIAC TROPONIN I PNL SERPL HS: 4 NG/L (ref 0–53)
CARDIAC TROPONIN I PNL SERPL HS: 5 NG/L (ref 0–53)
CHLORIDE BLDV-SCNC: 100 MMOL/L (ref 98–107)
CHLORIDE SERPL-SCNC: 99 MMOL/L (ref 98–107)
CO2 SERPL-SCNC: 32 MMOL/L (ref 21–32)
CREAT SERPL-MCNC: 0.88 MG/DL (ref 0.5–1.3)
D DIMER PPP FEU-MCNC: 562 NG/ML FEU
EGFRCR SERPLBLD CKD-EPI 2021: >90 ML/MIN/1.73M*2
EOSINOPHIL # BLD AUTO: 0.17 X10*3/UL (ref 0–0.7)
EOSINOPHIL NFR BLD AUTO: 1.7 %
ERYTHROCYTE [DISTWIDTH] IN BLOOD BY AUTOMATED COUNT: 12 % (ref 11.5–14.5)
FLUAV RNA RESP QL NAA+PROBE: NOT DETECTED
FLUBV RNA RESP QL NAA+PROBE: NOT DETECTED
GLUCOSE BLDV-MCNC: 179 MG/DL (ref 74–99)
GLUCOSE SERPL-MCNC: 180 MG/DL (ref 74–99)
HCO3 BLDV-SCNC: 40.5 MMOL/L (ref 22–26)
HCT VFR BLD AUTO: 45.4 % (ref 41–52)
HCT VFR BLD EST: 46 % (ref 41–52)
HGB BLD-MCNC: 15.2 G/DL (ref 13.5–17.5)
HGB BLDV-MCNC: 15.3 G/DL (ref 13.5–17.5)
IMM GRANULOCYTES # BLD AUTO: 0.04 X10*3/UL (ref 0–0.7)
IMM GRANULOCYTES NFR BLD AUTO: 0.4 % (ref 0–0.9)
INHALED O2 CONCENTRATION: 21 %
INR PPP: 1.1 (ref 0.9–1.1)
LACTATE BLDV-SCNC: 0.8 MMOL/L (ref 0.4–2)
LYMPHOCYTES # BLD AUTO: 1.44 X10*3/UL (ref 1.2–4.8)
LYMPHOCYTES NFR BLD AUTO: 14.2 %
MAGNESIUM SERPL-MCNC: 1.45 MG/DL (ref 1.6–2.4)
MCH RBC QN AUTO: 30.8 PG (ref 26–34)
MCHC RBC AUTO-ENTMCNC: 33.5 G/DL (ref 32–36)
MCV RBC AUTO: 92 FL (ref 80–100)
MONOCYTES # BLD AUTO: 0.65 X10*3/UL (ref 0.1–1)
MONOCYTES NFR BLD AUTO: 6.4 %
NEUTROPHILS # BLD AUTO: 7.81 X10*3/UL (ref 1.2–7.7)
NEUTROPHILS NFR BLD AUTO: 77.1 %
NRBC BLD-RTO: 0 /100 WBCS (ref 0–0)
OXYHGB MFR BLDV: 86.4 % (ref 45–75)
PCO2 BLDV: 61 MM HG (ref 41–51)
PH BLDV: 7.43 PH (ref 7.33–7.43)
PLATELET # BLD AUTO: 226 X10*3/UL (ref 150–450)
PO2 BLDV: 60 MM HG (ref 35–45)
POTASSIUM BLDV-SCNC: 4.8 MMOL/L (ref 3.5–5.3)
POTASSIUM SERPL-SCNC: 3.8 MMOL/L (ref 3.5–5.3)
PROT SERPL-MCNC: 7.2 G/DL (ref 6.4–8.2)
PROTHROMBIN TIME: 12.7 SECONDS (ref 9.8–12.8)
RBC # BLD AUTO: 4.94 X10*6/UL (ref 4.5–5.9)
RSV RNA RESP QL NAA+PROBE: NOT DETECTED
S PYO DNA THROAT QL NAA+PROBE: DETECTED
SAO2 % BLDV: 90 % (ref 45–75)
SARS-COV-2 RNA RESP QL NAA+PROBE: NOT DETECTED
SODIUM BLDV-SCNC: 134 MMOL/L (ref 136–145)
SODIUM SERPL-SCNC: 139 MMOL/L (ref 136–145)
TSH SERPL-ACNC: 1.51 MIU/L (ref 0.44–3.98)
WBC # BLD AUTO: 10.1 X10*3/UL (ref 4.4–11.3)

## 2024-04-05 PROCEDURE — 83880 ASSAY OF NATRIURETIC PEPTIDE: CPT | Performed by: EMERGENCY MEDICINE

## 2024-04-05 PROCEDURE — 84484 ASSAY OF TROPONIN QUANT: CPT | Performed by: EMERGENCY MEDICINE

## 2024-04-05 PROCEDURE — 93005 ELECTROCARDIOGRAM TRACING: CPT

## 2024-04-05 PROCEDURE — 84443 ASSAY THYROID STIM HORMONE: CPT | Performed by: STUDENT IN AN ORGANIZED HEALTH CARE EDUCATION/TRAINING PROGRAM

## 2024-04-05 PROCEDURE — 84132 ASSAY OF SERUM POTASSIUM: CPT | Performed by: EMERGENCY MEDICINE

## 2024-04-05 PROCEDURE — 85025 COMPLETE CBC W/AUTO DIFF WBC: CPT | Performed by: EMERGENCY MEDICINE

## 2024-04-05 PROCEDURE — 2500000004 HC RX 250 GENERAL PHARMACY W/ HCPCS (ALT 636 FOR OP/ED): Performed by: STUDENT IN AN ORGANIZED HEALTH CARE EDUCATION/TRAINING PROGRAM

## 2024-04-05 PROCEDURE — 99284 EMERGENCY DEPT VISIT MOD MDM: CPT | Mod: 25

## 2024-04-05 PROCEDURE — 96375 TX/PRO/DX INJ NEW DRUG ADDON: CPT

## 2024-04-05 PROCEDURE — 71045 X-RAY EXAM CHEST 1 VIEW: CPT | Performed by: RADIOLOGY

## 2024-04-05 PROCEDURE — 83735 ASSAY OF MAGNESIUM: CPT | Performed by: EMERGENCY MEDICINE

## 2024-04-05 PROCEDURE — 94640 AIRWAY INHALATION TREATMENT: CPT

## 2024-04-05 PROCEDURE — 87637 SARSCOV2&INF A&B&RSV AMP PRB: CPT | Performed by: STUDENT IN AN ORGANIZED HEALTH CARE EDUCATION/TRAINING PROGRAM

## 2024-04-05 PROCEDURE — 36415 COLL VENOUS BLD VENIPUNCTURE: CPT | Performed by: EMERGENCY MEDICINE

## 2024-04-05 PROCEDURE — 85610 PROTHROMBIN TIME: CPT | Performed by: EMERGENCY MEDICINE

## 2024-04-05 PROCEDURE — 2500000001 HC RX 250 WO HCPCS SELF ADMINISTERED DRUGS (ALT 637 FOR MEDICARE OP): Performed by: STUDENT IN AN ORGANIZED HEALTH CARE EDUCATION/TRAINING PROGRAM

## 2024-04-05 PROCEDURE — 87651 STREP A DNA AMP PROBE: CPT | Performed by: STUDENT IN AN ORGANIZED HEALTH CARE EDUCATION/TRAINING PROGRAM

## 2024-04-05 PROCEDURE — 96374 THER/PROPH/DIAG INJ IV PUSH: CPT

## 2024-04-05 PROCEDURE — 71045 X-RAY EXAM CHEST 1 VIEW: CPT

## 2024-04-05 PROCEDURE — 2500000002 HC RX 250 W HCPCS SELF ADMINISTERED DRUGS (ALT 637 FOR MEDICARE OP, ALT 636 FOR OP/ED): Performed by: STUDENT IN AN ORGANIZED HEALTH CARE EDUCATION/TRAINING PROGRAM

## 2024-04-05 PROCEDURE — 85379 FIBRIN DEGRADATION QUANT: CPT | Performed by: STUDENT IN AN ORGANIZED HEALTH CARE EDUCATION/TRAINING PROGRAM

## 2024-04-05 PROCEDURE — 96372 THER/PROPH/DIAG INJ SC/IM: CPT | Performed by: STUDENT IN AN ORGANIZED HEALTH CARE EDUCATION/TRAINING PROGRAM

## 2024-04-05 PROCEDURE — 99285 EMERGENCY DEPT VISIT HI MDM: CPT | Performed by: EMERGENCY MEDICINE

## 2024-04-05 RX ORDER — AZITHROMYCIN 500 MG/1
250 TABLET, FILM COATED ORAL DAILY
Qty: 2 TABLET | Refills: 0 | Status: SHIPPED | OUTPATIENT
Start: 2024-04-05 | End: 2024-04-05 | Stop reason: SDUPTHER

## 2024-04-05 RX ORDER — DEXAMETHASONE SODIUM PHOSPHATE 100 MG/10ML
10 INJECTION INTRAMUSCULAR; INTRAVENOUS ONCE
Status: COMPLETED | OUTPATIENT
Start: 2024-04-05 | End: 2024-04-05

## 2024-04-05 RX ORDER — AZITHROMYCIN 500 MG/1
250 TABLET, FILM COATED ORAL DAILY
Qty: 2 TABLET | Refills: 0 | Status: SHIPPED | OUTPATIENT
Start: 2024-04-05 | End: 2024-04-09

## 2024-04-05 RX ORDER — DEXAMETHASONE 6 MG/1
9 TABLET ORAL ONCE
Qty: 1.5 TABLET | Refills: 0 | Status: SHIPPED | OUTPATIENT
Start: 2024-04-05 | End: 2024-04-05

## 2024-04-05 RX ORDER — KETOROLAC TROMETHAMINE 15 MG/ML
15 INJECTION, SOLUTION INTRAMUSCULAR; INTRAVENOUS ONCE
Status: COMPLETED | OUTPATIENT
Start: 2024-04-05 | End: 2024-04-05

## 2024-04-05 RX ORDER — DEXAMETHASONE 6 MG/1
9 TABLET ORAL ONCE
Qty: 1.5 TABLET | Refills: 0 | Status: SHIPPED | OUTPATIENT
Start: 2024-04-05 | End: 2024-04-05 | Stop reason: SDUPTHER

## 2024-04-05 RX ORDER — AZITHROMYCIN 500 MG/1
500 TABLET, FILM COATED ORAL ONCE
Status: COMPLETED | OUTPATIENT
Start: 2024-04-05 | End: 2024-04-05

## 2024-04-05 RX ORDER — ACETAMINOPHEN 325 MG/1
975 TABLET ORAL ONCE
Status: DISCONTINUED | OUTPATIENT
Start: 2024-04-05 | End: 2024-04-05

## 2024-04-05 RX ORDER — MAGNESIUM SULFATE HEPTAHYDRATE 40 MG/ML
2 INJECTION, SOLUTION INTRAVENOUS ONCE
Status: DISCONTINUED | OUTPATIENT
Start: 2024-04-05 | End: 2024-04-05

## 2024-04-05 RX ORDER — LANOLIN ALCOHOL/MO/W.PET/CERES
800 CREAM (GRAM) TOPICAL ONCE
Status: COMPLETED | OUTPATIENT
Start: 2024-04-05 | End: 2024-04-05

## 2024-04-05 RX ORDER — IPRATROPIUM BROMIDE AND ALBUTEROL SULFATE 2.5; .5 MG/3ML; MG/3ML
3 SOLUTION RESPIRATORY (INHALATION) EVERY 20 MIN
Status: COMPLETED | OUTPATIENT
Start: 2024-04-05 | End: 2024-04-05

## 2024-04-05 RX ADMIN — IPRATROPIUM BROMIDE AND ALBUTEROL SULFATE 3 ML: .5; 3 SOLUTION RESPIRATORY (INHALATION) at 11:20

## 2024-04-05 RX ADMIN — KETOROLAC TROMETHAMINE 15 MG: 15 INJECTION, SOLUTION INTRAMUSCULAR; INTRAVENOUS at 11:23

## 2024-04-05 RX ADMIN — AZITHROMYCIN DIHYDRATE 500 MG: 500 TABLET ORAL at 13:10

## 2024-04-05 RX ADMIN — Medication 800 MG: at 13:10

## 2024-04-05 RX ADMIN — IPRATROPIUM BROMIDE AND ALBUTEROL SULFATE 3 ML: .5; 3 SOLUTION RESPIRATORY (INHALATION) at 11:40

## 2024-04-05 RX ADMIN — DEXAMETHASONE SODIUM PHOSPHATE 10 MG: 10 INJECTION INTRAMUSCULAR; INTRAVENOUS at 11:56

## 2024-04-05 RX ADMIN — IPRATROPIUM BROMIDE AND ALBUTEROL SULFATE 3 ML: .5; 3 SOLUTION RESPIRATORY (INHALATION) at 11:05

## 2024-04-05 RX ADMIN — PENICILLIN G BENZATHINE 1.2 MILLION UNITS: 1200000 INJECTION, SUSPENSION INTRAMUSCULAR at 13:10

## 2024-04-05 ASSESSMENT — PAIN - FUNCTIONAL ASSESSMENT: PAIN_FUNCTIONAL_ASSESSMENT: 0-10

## 2024-04-05 ASSESSMENT — PAIN DESCRIPTION - LOCATION: LOCATION: THROAT

## 2024-04-05 NOTE — ED PROVIDER NOTES
CC: Chest Pain     HPI:  Patient is a 49-year-old male with PMH of HFpEF, HTN, chronic hypercapnic and hypoxic respiratory failure secondary to COPD on baseline 4 L nasal cannula and YANI, presenting to the ED with chest pain and sore throat.  Patient states he is mainly here due to his sore throat.  Has had nasal congestion rhinorrhea and sore throat for couple of days.  Has not tried anything for his symptoms.  Denies any fevers or chills.  States that chest discomfort feels like it secondary to his breathing.  Endorses baseline cough.  No radiation of the pain or exertional component.      Limitations to History: None    Additional History Obtained from: Documentation    Records Reviewed: Recent available ED and inpatient notes reviewed in EMR.    PMHx/PSHx:  Per HPI.   - has a past medical history of CHF (congestive heart failure) (CMS/[facility]) and COPD (chronic obstructive pulmonary disease) (CMS/[facility]).  - has no past surgical history on file.    Medications: Reviewed in EMR. See EMR for complete list of medications and doses.    Allergies:  Patient has no known allergies.    Social History:  - Tobacco:  reports that he has never smoked. He has never been exposed to tobacco smoke. He has never used smokeless tobacco.   - Alcohol:  reports that he does not currently use alcohol.   - Illicit Drugs:  reports no history of drug use.   ???????????????????????????????????????????????????????????????  Triage Vitals:  T 35.8 °C (96.5 °F)  HR (!) 108  BP (!) 152/96  RR 20  O2 (!) 91 % (pt has hx of COPD, 4 L baseline, on 5 L in triage) Supplemental oxygen (6L)    PHYSICAL EXAM:   VS: As documented in the triage note and EMR flowsheet from this visit were reviewed.  Gen: obese male sitting in bed NAD.   Eyes: PERRL, EOMI. Clear scerla.  HENT: Posterior pharyngeal erythema without exudate or unilateral fullness/tonsillar deviation  Neck: No notable cervical adenopathy, full range of motion of the neck without  restriction  Resp: Nonlabored breathing on 4 L nasal cannula, diminished air movement with expiratory wheezes  CV: RRR, nl S1, S2, no murmurs  Abd: Soft, non-distended, non-tender, no rebound or guarding  Ext:  pulses full and equal  Skin: WWP. No systemic rashes or lesions.  MSK: normal muscle bulk, no obvious joint swelling in extremities  Neuro:  AAOx3, speech fluent, MAEx4, no focal deficit  Psych:  Appropriate mood and affect  ???????????????????????????????????????????????????????????????    ED Labs/Imaging:   Labs Reviewed   GROUP A STREPTOCOCCUS, PCR - Abnormal       Result Value    Group A Strep PCR Detected (*)    CBC WITH AUTO DIFFERENTIAL - Abnormal    WBC 10.1      nRBC 0.0      RBC 4.94      Hemoglobin 15.2      Hematocrit 45.4      MCV 92      MCH 30.8      MCHC 33.5      RDW 12.0      Platelets 226      Neutrophils % 77.1      Immature Granulocytes %, Automated 0.4      Lymphocytes % 14.2      Monocytes % 6.4      Eosinophils % 1.7      Basophils % 0.2      Neutrophils Absolute 7.81 (*)     Immature Granulocytes Absolute, Automated 0.04      Lymphocytes Absolute 1.44      Monocytes Absolute 0.65      Eosinophils Absolute 0.17      Basophils Absolute 0.02     COMPREHENSIVE METABOLIC PANEL - Abnormal    Glucose 180 (*)     Sodium 139      Potassium 3.8      Chloride 99      Bicarbonate 32      Anion Gap 12      Urea Nitrogen 8      Creatinine 0.88      eGFR >90      Calcium 8.7      Albumin 3.8      Alkaline Phosphatase 78      Total Protein 7.2      AST 12      Bilirubin, Total 1.2      ALT 10     MAGNESIUM - Abnormal    Magnesium 1.45 (*)    BLOOD GAS VENOUS FULL PANEL - Abnormal    POCT pH, Venous 7.43      POCT pCO2, Venous 61 (*)     POCT pO2, Venous 60 (*)     POCT SO2, Venous 90 (*)     POCT Oxy Hemoglobin, Venous 86.4 (*)     POCT Hematocrit Calculated, Venous 46.0      POCT Sodium, Venous 134 (*)     POCT Potassium, Venous 4.8      POCT Chloride, Venous 100      POCT Ionized Calicum, Venous  1.10      POCT Glucose, Venous 179 (*)     POCT Lactate, Venous 0.8      POCT Base Excess, Venous 13.0 (*)     POCT HCO3 Calculated, Venous 40.5 (*)     POCT Hemoglobin, Venous 15.3      POCT Anion Gap, Venous -2.0 (*)     Patient Temperature 37.0      FiO2 21     D-DIMER, VTE EXCLUSION - Abnormal    D-Dimer, Quantitative VTE Exclusion 562 (*)     Narrative:     The VTE Exclusion D-Dimer assay is reported in ng/mL Fibrinogen Equivalent Units (FEU).    Per 's instructions for use, a value of less than 500 ng/mL (FEU) may help to exclude DVT or PE in outpatients when the assay is used with a clinical pretest probability assessment.([facility] must utilize and document eCalc 'Wells Score Deep Vein Thrombosis Risk' for DVT exclusion only. Emergency Department should utilize  Guidelines for Emergency Department Use of the VTE Exclusion D-Dimer and Clinical Pretest probability assessment model for DVT or PE exclusion.)   B-TYPE NATRIURETIC PEPTIDE - Normal    BNP 5      Narrative:        <100 pg/mL - Heart failure unlikely  100-299 pg/mL - Intermediate probability of acute heart                  failure exacerbation. Correlate with clinical                  context and patient history.    >=300 pg/mL - Heart Failure likely. Correlate with clinical                  context and patient history.     Biotin interference may cause falsely decreased results. Patients taking a Biotin dose of up to 5 mg/day should refrain from taking Biotin for 24 hours before sample  collection. Providers may contact their local laboratory for further information.   PROTIME-INR - Normal    Protime 12.7      INR 1.1     SERIAL TROPONIN-INITIAL - Normal    Troponin I, High Sensitivity 5      Narrative:     Less than 99th percentile of normal range cutoff-  Female and children under 18 years old <35 ng/L; Male <54 ng/L: Negative  Repeat testing should be performed if clinically indicated.     Female and children under 18 years old   ng/L; Male  ng/L:  Consistent with possible cardiac damage and possible increased clinical   risk. Serial measurements may help to assess extent of myocardial damage.     >120 ng/L: Consistent with cardiac damage, increased clinical risk and  myocardial infarction. Serial measurements may help assess extent of   myocardial damage.      NOTE: Children less than 1 year old may have higher baseline troponin   levels and results should be interpreted in conjunction with the overall   clinical context.    NOTE: Troponin I testing is performed using a different   testing methodology at [facility] than at other   [facility] hospitals. Direct result comparisons should only   be made within the same method.     SERIAL TROPONIN, 1 HOUR - Normal    Troponin I, High Sensitivity 4      Narrative:     Less than 99th percentile of normal range cutoff-  Female and children under 18 years old <35 ng/L; Male <54 ng/L: Negative  Repeat testing should be performed if clinically indicated.     Female and children under 18 years old  ng/L; Male  ng/L:  Consistent with possible cardiac damage and possible increased clinical   risk. Serial measurements may help to assess extent of myocardial damage.     >120 ng/L: Consistent with cardiac damage, increased clinical risk and  myocardial infarction. Serial measurements may help assess extent of   myocardial damage.      NOTE: Children less than 1 year old may have higher baseline troponin   levels and results should be interpreted in conjunction with the overall   clinical context.    NOTE: Troponin I testing is performed using a different   testing methodology at [facility] than at other   [facility]. Direct result comparisons should only   be made within the same method.     TSH WITH REFLEX TO FREE T4 IF ABNORMAL - Normal    Thyroid Stimulating Hormone 1.51      Narrative:     TSH testing is performed using different testing methodology at  [facility] than at other [facility]. Direct result comparisons should only be made within the same method.     SARS-COV-2 AND INFLUENZA A/B PCR - Normal    Flu A Result Not Detected      Flu B Result Not Detected      Coronavirus 2019, PCR Not Detected      Narrative:     This assay has received FDA Emergency Use Authorization (EUA) and  is only authorized for the duration of time that circumstances exist to justify the authorization of the emergency use of in vitro diagnostic tests for the detection of SARS-CoV-2 virus and/or diagnosis of COVID-19 infection under section 564(b)(1) of the Act, 21 U.S.C. 360bbb-3(b)(1). Testing for SARS-CoV-2 is only recommended for patients who meet current clinical and/or epidemiological criteria as defined by federal, state, or local public health directives. This assay is an in vitro diagnostic nucleic acid amplification test for the qualitative detection of SARS-CoV-2, Influenza A, and Influenza B from nasopharyngeal specimens and has been validated for use at [facility]. Negative results do not preclude COVID-19 infections or Influenza A/B infections, and should not be used as the sole basis for diagnosis, treatment, or other management decisions. If Influenza A/B and RSV PCR results are negative, testing for Parainfluenza virus, Adenovirus and Metapneumovirus is routinely performed for [facility] pediatric oncology and intensive care inpatients, and is available on other patients by placing an add-on request.    RSV PCR - Normal    RSV PCR Not Detected      Narrative:     This assay is an FDA-cleared, in vitro diagnostic nucleic acid amplification test for the detection of RSV from nasopharyngeal specimens, and has been validated for use at [facility]. Negative results do not preclude RSV infections, and should not be used as the sole basis for diagnosis, treatment, or other management decisions. If Influenza A/B and  RSV PCR results are negative, testing for Parainfluenza virus, Adenovirus and Metapneumovirus is routinely performed for pediatric oncology and intensive care inpatients at [facility], and is available on other patients by placing an add-on request.       TROPONIN SERIES- (INITIAL, 1 HR)    Narrative:     The following orders were created for panel order Troponin I Series, High Sensitivity (0, 1 HR).  Procedure                               Abnormality         Status                     ---------                               -----------         ------                     Troponin I, High Sensiti...[[id number]]  Normal              Final result               Troponin, High Sensitivi...[[id number]]  Normal              Final result                 Please view results for these tests on the individual orders.     XR chest 1 view   Final Result   Mild interstitial pulmonary edema. Somewhat more confluent airspace   disease at the right lung base likely component of the edema with   superimposed pneumonia difficult to exclude             I personally reviewed the image(s)/study and resident interpretation.   I agree with the findings as stated by resident Lonny Dang.   Data analyzed and images interpreted at [facility], [place], [place].        MACRO:   None        Signed by: Octavio Nunes 4/5/2024 12:50 PM   Dictation workstation:   [id number]          ED Course & [facility]   ED Course as of 04/07/24 1701 Fri Apr 05, 2024   1226 I independently interpreted the EKG:  Tachycardic.  Sinus rhythm with occasional PVC.  Normal NE, QRS, and Qtc intervals.   No ST segment elevations, depressions, or T wave inversions.  Impression: Sinus tachycardia, no STEMI. [KR]      ED Course User Index  [KR] Lois Maldonado MD         Diagnoses as of 04/07/24 1701   Pneumonia due to infectious organism, unspecified laterality, unspecified part of lung   COPD with acute exacerbation (CMS/[facility])   Strep  pharyngitis       Medical Decision Making:  This is a 49-year-old male with above-stated history presenting to the ED with chest discomfort and sore throat.  Patient hemodynamically stable not in acute distress on arrival.  Nontoxic appearance. No trismus.  Roof of mouth soft. Posterior oropharynx without unilateral fullness or uvular deviation. No airway compromise. No change in voice, exudates, enlarged lymph nodes. Able to tolerate PO. Given History and Exam I have low suspicion for this presentation being caused by PTA, RPA, Ludwigs angina, Epiglottitis or Bacterial Tracheitis.  Patient was found to have strep pharyngitis which was treated with IM penicillin.  Patient also given Toradol and Tylenol which improved his symptoms.    Given patient's chest pain, EKG normal sinus rhythm and troponin negative, do not suspect ACS. Was initially requiring 6 L nasal cannula but after bronchodilators treatment and steroids was weaned back to his baseline 4 L nasal cannula.  Chest x-ray with questionable pneumonia for which a CT scan was ordered however patient unable to tolerate it due to claustrophobia when laying flat.  After risk-benefit discussion, patient opted to be treated prophylactically for community-acquired pneumonia rather than try again to obtain a CAT scan.  Patient also was offered observation admission, however due to his birthday tomorrow declined hospital admission at this time.  Patient was given strict return precautions and agrees to come back with any new or worsening symptoms.  Discharged in stable condition.    Social Determinants Limiting Care:  None identified    Disposition:  As a result of the work-up, patient was discharged home.  They were informed of their diagnosis and instructed to come back with any concerns or worsening of condition and was agreeable to the plan as discussed above.  The patient was given the opportunity to ask questions.  All of the patient's questions were answered.   The patient remained stable under my care.    Patient seen and discussed with attending physician.    Lois Maldonado MD PGY3  Emergency Medicine      Procedures ? Smart[employer] last updated 4/7/2024 5:01 PM          Lois Maldonado MD  Resident  04/07/24 9841

## 2024-04-05 NOTE — ED TRIAGE NOTES
Patient came to ED with c/o chest pain on and off for 3 days. Patient reports midsternal pain, non radiating. Pt additionally reports sore throat. Patient is on 4L O2 at baseline, pt placed on 5 L O2 in triage due to SPO2 @ 85%. Pt reports some shortness of breath, pt speaking in full sentences. Pt has hx of COPD, DM, HTN.

## 2024-04-06 LAB
ATRIAL RATE: 104 BPM
P AXIS: -38 DEGREES
P OFFSET: 198 MS
P ONSET: 135 MS
PR INTERVAL: 160 MS
Q ONSET: 215 MS
QRS COUNT: 17 BEATS
QRS DURATION: 82 MS
QT INTERVAL: 330 MS
QTC CALCULATION(BAZETT): 433 MS
QTC FREDERICIA: 396 MS
R AXIS: 91 DEGREES
T AXIS: 9 DEGREES
T OFFSET: 380 MS
VENTRICULAR RATE: 104 BPM

## 2024-09-21 ENCOUNTER — APPOINTMENT (OUTPATIENT)
Dept: RADIOLOGY | Facility: HOSPITAL | Age: 49
End: 2024-09-21
Payer: MEDICARE

## 2024-09-21 ENCOUNTER — CLINICAL SUPPORT (OUTPATIENT)
Dept: EMERGENCY MEDICINE | Facility: HOSPITAL | Age: 49
End: 2024-09-21
Payer: MEDICARE

## 2024-09-21 ENCOUNTER — HOSPITAL ENCOUNTER (EMERGENCY)
Facility: HOSPITAL | Age: 49
Discharge: HOME | End: 2024-09-21
Attending: STUDENT IN AN ORGANIZED HEALTH CARE EDUCATION/TRAINING PROGRAM
Payer: MEDICARE

## 2024-09-21 VITALS
DIASTOLIC BLOOD PRESSURE: 82 MMHG | TEMPERATURE: 97.5 F | BODY MASS INDEX: 38.36 KG/M2 | WEIGHT: 315 LBS | OXYGEN SATURATION: 94 % | HEIGHT: 76 IN | RESPIRATION RATE: 19 BRPM | HEART RATE: 80 BPM | SYSTOLIC BLOOD PRESSURE: 139 MMHG

## 2024-09-21 DIAGNOSIS — I50.33 ACUTE ON CHRONIC DIASTOLIC CONGESTIVE HEART FAILURE: Primary | ICD-10-CM

## 2024-09-21 LAB
ALBUMIN SERPL BCP-MCNC: 3.4 G/DL (ref 3.4–5)
ALP SERPL-CCNC: 75 U/L (ref 33–120)
ALT SERPL W P-5'-P-CCNC: 21 U/L (ref 10–52)
ANION GAP BLDV CALCULATED.4IONS-SCNC: 0 MMOL/L (ref 10–25)
ANION GAP SERPL CALC-SCNC: 11 MMOL/L (ref 10–20)
AST SERPL W P-5'-P-CCNC: 16 U/L (ref 9–39)
ATRIAL RATE: 87 BPM
BASE EXCESS BLDV CALC-SCNC: 8.8 MMOL/L (ref -2–3)
BASOPHILS # BLD AUTO: 0.03 X10*3/UL (ref 0–0.1)
BASOPHILS NFR BLD AUTO: 0.5 %
BILIRUB SERPL-MCNC: 0.6 MG/DL (ref 0–1.2)
BNP SERPL-MCNC: 30 PG/ML (ref 0–99)
BODY TEMPERATURE: 37 DEGREES CELSIUS
BUN SERPL-MCNC: 14 MG/DL (ref 6–23)
CA-I BLDV-SCNC: 1.08 MMOL/L (ref 1.1–1.33)
CALCIUM SERPL-MCNC: 8 MG/DL (ref 8.6–10.6)
CARDIAC TROPONIN I PNL SERPL HS: 6 NG/L (ref 0–53)
CARDIAC TROPONIN I PNL SERPL HS: 7 NG/L (ref 0–53)
CHLORIDE BLDV-SCNC: 101 MMOL/L (ref 98–107)
CHLORIDE SERPL-SCNC: 100 MMOL/L (ref 98–107)
CO2 SERPL-SCNC: 33 MMOL/L (ref 21–32)
CREAT SERPL-MCNC: 0.98 MG/DL (ref 0.5–1.3)
EGFRCR SERPLBLD CKD-EPI 2021: >90 ML/MIN/1.73M*2
EOSINOPHIL # BLD AUTO: 0.14 X10*3/UL (ref 0–0.7)
EOSINOPHIL NFR BLD AUTO: 2.2 %
ERYTHROCYTE [DISTWIDTH] IN BLOOD BY AUTOMATED COUNT: 13 % (ref 11.5–14.5)
GLUCOSE BLD MANUAL STRIP-MCNC: 234 MG/DL (ref 74–99)
GLUCOSE BLDV-MCNC: 376 MG/DL (ref 74–99)
GLUCOSE SERPL-MCNC: 345 MG/DL (ref 74–99)
HCO3 BLDV-SCNC: 38 MMOL/L (ref 22–26)
HCT VFR BLD AUTO: 48 % (ref 41–52)
HCT VFR BLD EST: 45 % (ref 41–52)
HGB BLD-MCNC: 14.5 G/DL (ref 13.5–17.5)
HGB BLDV-MCNC: 15 G/DL (ref 13.5–17.5)
HOLD SPECIMEN: NORMAL
IMM GRANULOCYTES # BLD AUTO: 0.03 X10*3/UL (ref 0–0.7)
IMM GRANULOCYTES NFR BLD AUTO: 0.5 % (ref 0–0.9)
INHALED O2 CONCENTRATION: 36 %
LACTATE BLDV-SCNC: 1.3 MMOL/L (ref 0.4–2)
LYMPHOCYTES # BLD AUTO: 1.89 X10*3/UL (ref 1.2–4.8)
LYMPHOCYTES NFR BLD AUTO: 30 %
MCH RBC QN AUTO: 30.9 PG (ref 26–34)
MCHC RBC AUTO-ENTMCNC: 30.2 G/DL (ref 32–36)
MCV RBC AUTO: 102 FL (ref 80–100)
MONOCYTES # BLD AUTO: 0.45 X10*3/UL (ref 0.1–1)
MONOCYTES NFR BLD AUTO: 7.2 %
NEUTROPHILS # BLD AUTO: 3.75 X10*3/UL (ref 1.2–7.7)
NEUTROPHILS NFR BLD AUTO: 59.6 %
NRBC BLD-RTO: 0.5 /100 WBCS (ref 0–0)
OXYHGB MFR BLDV: 88.7 % (ref 45–75)
P AXIS: 46 DEGREES
P OFFSET: 206 MS
P ONSET: 143 MS
PCO2 BLDV: 72 MM HG (ref 41–51)
PH BLDV: 7.33 PH (ref 7.33–7.43)
PLATELET # BLD AUTO: 222 X10*3/UL (ref 150–450)
PO2 BLDV: 68 MM HG (ref 35–45)
POTASSIUM BLDV-SCNC: 4.4 MMOL/L (ref 3.5–5.3)
POTASSIUM SERPL-SCNC: 4.2 MMOL/L (ref 3.5–5.3)
PR INTERVAL: 142 MS
PROT SERPL-MCNC: 6.5 G/DL (ref 6.4–8.2)
Q ONSET: 214 MS
QRS COUNT: 15 BEATS
QRS DURATION: 78 MS
QT INTERVAL: 356 MS
QTC CALCULATION(BAZETT): 428 MS
QTC FREDERICIA: 402 MS
R AXIS: 5 DEGREES
RBC # BLD AUTO: 4.7 X10*6/UL (ref 4.5–5.9)
SAO2 % BLDV: 92 % (ref 45–75)
SARS-COV-2 RNA RESP QL NAA+PROBE: NOT DETECTED
SODIUM BLDV-SCNC: 135 MMOL/L (ref 136–145)
SODIUM SERPL-SCNC: 140 MMOL/L (ref 136–145)
T AXIS: 15 DEGREES
T OFFSET: 392 MS
VENTRICULAR RATE: 87 BPM
WBC # BLD AUTO: 6.3 X10*3/UL (ref 4.4–11.3)

## 2024-09-21 PROCEDURE — 83880 ASSAY OF NATRIURETIC PEPTIDE: CPT | Performed by: STUDENT IN AN ORGANIZED HEALTH CARE EDUCATION/TRAINING PROGRAM

## 2024-09-21 PROCEDURE — 94640 AIRWAY INHALATION TREATMENT: CPT

## 2024-09-21 PROCEDURE — 85025 COMPLETE CBC W/AUTO DIFF WBC: CPT | Performed by: STUDENT IN AN ORGANIZED HEALTH CARE EDUCATION/TRAINING PROGRAM

## 2024-09-21 PROCEDURE — 36415 COLL VENOUS BLD VENIPUNCTURE: CPT | Performed by: STUDENT IN AN ORGANIZED HEALTH CARE EDUCATION/TRAINING PROGRAM

## 2024-09-21 PROCEDURE — 2500000004 HC RX 250 GENERAL PHARMACY W/ HCPCS (ALT 636 FOR OP/ED): Mod: SE | Performed by: STUDENT IN AN ORGANIZED HEALTH CARE EDUCATION/TRAINING PROGRAM

## 2024-09-21 PROCEDURE — 96376 TX/PRO/DX INJ SAME DRUG ADON: CPT | Mod: 59

## 2024-09-21 PROCEDURE — 76604 US EXAM CHEST: CPT | Performed by: STUDENT IN AN ORGANIZED HEALTH CARE EDUCATION/TRAINING PROGRAM

## 2024-09-21 PROCEDURE — 93010 ELECTROCARDIOGRAM REPORT: CPT

## 2024-09-21 PROCEDURE — 99285 EMERGENCY DEPT VISIT HI MDM: CPT | Performed by: STUDENT IN AN ORGANIZED HEALTH CARE EDUCATION/TRAINING PROGRAM

## 2024-09-21 PROCEDURE — 84484 ASSAY OF TROPONIN QUANT: CPT | Performed by: STUDENT IN AN ORGANIZED HEALTH CARE EDUCATION/TRAINING PROGRAM

## 2024-09-21 PROCEDURE — 96374 THER/PROPH/DIAG INJ IV PUSH: CPT | Mod: 59

## 2024-09-21 PROCEDURE — 99284 EMERGENCY DEPT VISIT MOD MDM: CPT | Mod: 25

## 2024-09-21 PROCEDURE — 87635 SARS-COV-2 COVID-19 AMP PRB: CPT | Performed by: STUDENT IN AN ORGANIZED HEALTH CARE EDUCATION/TRAINING PROGRAM

## 2024-09-21 PROCEDURE — 93005 ELECTROCARDIOGRAM TRACING: CPT

## 2024-09-21 PROCEDURE — 84132 ASSAY OF SERUM POTASSIUM: CPT | Performed by: STUDENT IN AN ORGANIZED HEALTH CARE EDUCATION/TRAINING PROGRAM

## 2024-09-21 PROCEDURE — 82947 ASSAY GLUCOSE BLOOD QUANT: CPT | Mod: 59

## 2024-09-21 PROCEDURE — 71045 X-RAY EXAM CHEST 1 VIEW: CPT | Mod: FOREIGN READ | Performed by: RADIOLOGY

## 2024-09-21 PROCEDURE — 2500000002 HC RX 250 W HCPCS SELF ADMINISTERED DRUGS (ALT 637 FOR MEDICARE OP, ALT 636 FOR OP/ED): Mod: SE | Performed by: STUDENT IN AN ORGANIZED HEALTH CARE EDUCATION/TRAINING PROGRAM

## 2024-09-21 PROCEDURE — 93308 TTE F-UP OR LMTD: CPT | Performed by: STUDENT IN AN ORGANIZED HEALTH CARE EDUCATION/TRAINING PROGRAM

## 2024-09-21 PROCEDURE — 71045 X-RAY EXAM CHEST 1 VIEW: CPT

## 2024-09-21 RX ORDER — FUROSEMIDE 10 MG/ML
40 INJECTION INTRAMUSCULAR; INTRAVENOUS ONCE
Status: DISCONTINUED | OUTPATIENT
Start: 2024-09-21 | End: 2024-09-21

## 2024-09-21 RX ORDER — FUROSEMIDE 10 MG/ML
20 INJECTION INTRAMUSCULAR; INTRAVENOUS ONCE
Status: COMPLETED | OUTPATIENT
Start: 2024-09-21 | End: 2024-09-21

## 2024-09-21 RX ORDER — AMLODIPINE BESYLATE 5 MG/1
10 TABLET ORAL DAILY
Status: DISCONTINUED | OUTPATIENT
Start: 2024-09-21 | End: 2024-09-21

## 2024-09-21 RX ORDER — DEXAMETHASONE SODIUM PHOSPHATE 10 MG/ML
10 INJECTION INTRAMUSCULAR; INTRAVENOUS ONCE
Status: DISCONTINUED | OUTPATIENT
Start: 2024-09-21 | End: 2024-09-21

## 2024-09-21 RX ORDER — MAGNESIUM SULFATE HEPTAHYDRATE 40 MG/ML
2 INJECTION, SOLUTION INTRAVENOUS ONCE
Status: DISCONTINUED | OUTPATIENT
Start: 2024-09-21 | End: 2024-09-21

## 2024-09-21 RX ORDER — AMLODIPINE BESYLATE 10 MG/1
10 TABLET ORAL DAILY
Qty: 30 TABLET | Refills: 0 | Status: SHIPPED | OUTPATIENT
Start: 2024-09-21 | End: 2024-10-22

## 2024-09-21 RX ORDER — FUROSEMIDE 10 MG/ML
40 INJECTION INTRAMUSCULAR; INTRAVENOUS ONCE
Status: COMPLETED | OUTPATIENT
Start: 2024-09-21 | End: 2024-09-21

## 2024-09-21 RX ORDER — ATORVASTATIN CALCIUM 20 MG/1
20 TABLET, FILM COATED ORAL DAILY
Qty: 30 TABLET | Refills: 0 | Status: SHIPPED | OUTPATIENT
Start: 2024-09-21 | End: 2024-10-21

## 2024-09-21 RX ORDER — IPRATROPIUM BROMIDE AND ALBUTEROL SULFATE 2.5; .5 MG/3ML; MG/3ML
3 SOLUTION RESPIRATORY (INHALATION) ONCE
Status: COMPLETED | OUTPATIENT
Start: 2024-09-21 | End: 2024-09-21

## 2024-09-21 RX ORDER — LOSARTAN POTASSIUM 50 MG/1
50 TABLET ORAL ONCE
Status: DISCONTINUED | OUTPATIENT
Start: 2024-09-21 | End: 2024-09-21

## 2024-09-21 RX ORDER — FUROSEMIDE 20 MG/1
TABLET ORAL
Qty: 20 TABLET | Refills: 0 | Status: SHIPPED | OUTPATIENT
Start: 2024-09-21 | End: 2024-10-07

## 2024-09-21 RX ORDER — LOSARTAN POTASSIUM 50 MG/1
50 TABLET ORAL DAILY
Qty: 30 TABLET | Refills: 0 | Status: SHIPPED | OUTPATIENT
Start: 2024-09-21 | End: 2024-10-22

## 2024-09-21 RX ORDER — ALBUTEROL SULFATE 90 UG/1
2 INHALANT RESPIRATORY (INHALATION) EVERY 4 HOURS PRN
Qty: 18 G | Refills: 0 | Status: SHIPPED | OUTPATIENT
Start: 2024-09-21

## 2024-09-21 RX ADMIN — FUROSEMIDE 20 MG: 10 INJECTION, SOLUTION INTRAVENOUS at 20:40

## 2024-09-21 RX ADMIN — IPRATROPIUM BROMIDE AND ALBUTEROL SULFATE 3 ML: .5; 3 SOLUTION RESPIRATORY (INHALATION) at 19:00

## 2024-09-21 RX ADMIN — FUROSEMIDE 40 MG: 10 INJECTION, SOLUTION INTRAVENOUS at 18:23

## 2024-09-21 ASSESSMENT — LIFESTYLE VARIABLES
EVER HAD A DRINK FIRST THING IN THE MORNING TO STEADY YOUR NERVES TO GET RID OF A HANGOVER: NO
EVER FELT BAD OR GUILTY ABOUT YOUR DRINKING: NO
HAVE YOU EVER FELT YOU SHOULD CUT DOWN ON YOUR DRINKING: NO
HAVE PEOPLE ANNOYED YOU BY CRITICIZING YOUR DRINKING: NO
TOTAL SCORE: 0

## 2024-09-21 ASSESSMENT — COLUMBIA-SUICIDE SEVERITY RATING SCALE - C-SSRS
6. HAVE YOU EVER DONE ANYTHING, STARTED TO DO ANYTHING, OR PREPARED TO DO ANYTHING TO END YOUR LIFE?: NO
1. IN THE PAST MONTH, HAVE YOU WISHED YOU WERE DEAD OR WISHED YOU COULD GO TO SLEEP AND NOT WAKE UP?: NO
2. HAVE YOU ACTUALLY HAD ANY THOUGHTS OF KILLING YOURSELF?: NO

## 2024-09-21 ASSESSMENT — PAIN - FUNCTIONAL ASSESSMENT: PAIN_FUNCTIONAL_ASSESSMENT: 0-10

## 2024-09-21 ASSESSMENT — PAIN SCALES - GENERAL
PAINLEVEL_OUTOF10: 0 - NO PAIN

## 2024-09-21 ASSESSMENT — PAIN DESCRIPTION - PROGRESSION: CLINICAL_PROGRESSION: NOT CHANGED

## 2024-09-21 NOTE — ED PROVIDER NOTES
"HPI   Chief Complaint   Patient presents with    Leg Swelling       HPI  49M patient who presents with SOB and increased bilateral leg swelling for several days. Arrived to triage with significant increased WOB, and saturating at 74%. Patient had reported to triage that he was on \"24 LPM\" oxygen at home, but because he lives close, thought he could make it here without his oxygen. Normally has oxygen bottle with him, does not have it today. Does endorse missing a few doses of his water pill. Does have inhalers at home that he uses irregularly.     Reviewed discharge summary from 11/28/23 at which time patient was admitted with chronic hypoxic/hypercapnic respiratory failure (pCO2 between 70s and 90s at baseline), COVID+, HFpEF decompensated, and was discharged on lasix increase from 20 to 40 mg, with plan for [facility] outpatient follow up.     I reviewed Care Everywhere, it appears that patient has been missing his appointments, and that on 8/26/24 a message was sent asking patient to schedule an appointment to have refills done again.    Called pharmacy, they indicate that has not filled diuretics for some time. Hydrochlorothiazide 50 mg 90 tablets filled 6/6/24, lasix 20 mg tablet 90 tablets filled 4/2/24. Trulicity and gabapentin last filled 8/16 and 8/20. Full list copied and pasted from secure chat below.     metformin 1,000 mg tablet 04/02/2024 90 days supply / 180 table  losartan 50 mg tablet 06/06/2024 90 180 tablet  hydrochlorothiazide 50 mg tablet 06/06/2024 90 90 tablet  gabapentin 100 mg capsule 08/20/2024 30 90 capsule --> data consistent with OARRS report   furosemide 20 mg tablet 04/02/2024 90 90 tablet  Trulicity 1.5 mg/0.5 mL subcutaneous pen injector 08/16/2024 28 2 mL  carbamazepine 100 mg chewable tablet 01/03/2024 90 180 tablet  atorvastatin 20 mg tablet 08/06/2024 90 90 tablet  atorvastatin 20 mg tablet 08/06/2024 90 90 tablet  amlodipine 10 mg tablet 06/06/2024 90 90 tablet  albuterol " sulfate HFA 90 mcg/actuation aerosol inhaler 01/03/2024 17 8.5 g    Patient History   Past Medical History:   Diagnosis Date    CHF (congestive heart failure) (Multi)     COPD (chronic obstructive pulmonary disease) (Multi)      No past surgical history on file.  No family history on file.  Social History     Tobacco Use    Smoking status: Never     Passive exposure: Never    Smokeless tobacco: Never   Vaping Use    Vaping status: Never Used    Passive vaping exposure: Yes   Substance Use Topics    Alcohol use: Not Currently    Drug use: Never       Physical Exam   ED Triage Vitals [09/21/24 1800]   Temperature Heart Rate Respirations BP   36.4 °C (97.5 °F) 88 20 (!) 180/93      Pulse Ox Temp src Heart Rate Source Patient Position   (!) 74 % -- Monitor Sitting      BP Location FiO2 (%)     Right arm --       Physical Exam  Constitutional:       Appearance: He is obese.      Comments: Significant increased WOB and deoxygenation while on RA, improves markedly when increased to 6 LPM (home 4 LPM O2)   HENT:      Head: Normocephalic and atraumatic.      Right Ear: Tympanic membrane normal.      Left Ear: Tympanic membrane normal.      Mouth/Throat:      Mouth: Mucous membranes are moist.   Cardiovascular:      Rate and Rhythm: Normal rate and regular rhythm.      Pulses: Normal pulses.      Heart sounds: Normal heart sounds.   Pulmonary:      Effort: Respiratory distress (improves with increase in O2) present.      Breath sounds: Rales (mild in bases) present.      Comments: Mild crackles in bases, no wheezing or delayed expiratory phase noted  Abdominal:      General: Abdomen is flat. Bowel sounds are normal.      Palpations: Abdomen is soft.   Musculoskeletal:      Cervical back: Normal range of motion and neck supple.   Skin:     General: Skin is warm and dry.      Capillary Refill: Capillary refill takes less than 2 seconds.   Neurological:      Mental Status: He is alert and oriented to person, place, and time.  Mental status is at baseline.   Psychiatric:         Mood and Affect: Mood normal.         ED Course & MDM   ED Course as of 09/21/24 2000   Sat Sep 21, 2024   1842 Pulse Ox(!): 74 %  Improved to 92% on home O2 4 LPM, holding on Bipap with patient's WOB WNL and pCO2 near baseline []   1854 POCT Glucose, Venous(!): 376  Repeat was 234 without intervention []   1856 Holding on steroids as patient has no wheezing, this appears to be a fluid overload situation and do not want to cause significant difficult to control hyperglycemia if not indicated []   1953 Patient had approx 3 L UO within 1.5 hours of lasix 40 mg. His oxygenation and work of breathing is improving. I will give patient an additional 20 mg IV lasix here, and then send home on lasix 2 week course, with plan for him to follow up with his PCP []      ED Course User Index  [] Ion Brady MD         Diagnoses as of 09/21/24 2000   Acute on chronic diastolic congestive heart failure (Multi)                 No data recorded     [place] Coma Scale Score: 15 (09/21/24 1802 : Moiz Melendez RN)                   Medical Decision Making  49M who presents with progressive dyspnea with exertion, normally on 4 LPM O2 at home, here on RA, immediately placed on 6 LPM O2 NC and improved, did not require Bipap. Chronic hypoxic, hypercapneic respiratory failure with acute hypoxic component. pCO2 at baseline. Troponin WNL, BNP WNL. Discussed with Pharmacy and patient, it appears that patient ran out of diuretics at least two weeks ago, if not 2 months ago. Performed POCUS, B lines on right but did not meet criteria for pulmonary edema as only one lung field; no effusions noted, EF in PSL and PSS at baseline preserved without signs of pericardial effusion. SX and A4C views not attainable with body habitus. Downtitrated patient back to home 4 LPM O2, gave 40 mg lasix, has 3 L UO, gave duonebs, home BP meds, and additional 20 mg IV lasix after patient centered  discussion, generally normal CXR with cephalization improved from prior CXR. Patient reports has follow up with [facility] PCP scheduled September 26th. I have some suspicion that patient will miss this appointment given prior history. Writing for 5 days of 40 mg oral lasix followed by down two 20 mg lasix for 10 days, in addition to home amlodipine and losartan for 30 days. Patient back to O2 baseline, appropriate for discharge.    CI: Acute on chronic diastolic heart failure  Dispo: Discharge  Condition: Stable, improved    Procedure  Procedures     Ion Brady MD  09/21/24 2018

## 2024-09-22 ENCOUNTER — PHARMACY VISIT (OUTPATIENT)
Dept: PHARMACY | Facility: CLINIC | Age: 49
End: 2024-09-22
Payer: COMMERCIAL

## 2024-09-22 PROCEDURE — RXMED WILLOW AMBULATORY MEDICATION CHARGE

## 2024-09-22 NOTE — ED PROCEDURE NOTE
Procedure    Performed by: Ion Brady MD  Authorized by: Ion Brady MD  Cardiac Indications: fluid overload                Procedure: Cardiac Ultrasound    Findings:    The pericardial space was visualized and was NEGATIVE for a significant pericardial effusion.  Activity: Ventricular contractions were visualized.  LV: LV systolic function was NORMAL.  RV: RV size was NORMAL.    Impression:  Cardiac: The focused cardiac ultrasound exam was NORMAL.  Procedure: Thoracic Ultrasound    Findings:  R Lung Sliding: The RIGHT chest was evaluated and LUNG SLIDING was visualized.  L Lung Sliding: The LEFT chest was evaluated and LUNG SLIDING was visualized.  R Effusion: The RIGHT chest was evaluated and there was NO PLEURAL EFFUSION.  L Effusion: The LEFT chest was evaluated and there was NO PLEURAL EFFUSION.  A-lines: The RIGHT chest was evaluated and multiple A-LINES were visualized  B-lines present: Only in one lung field on right, does not meet criteria.  R Consolidation: The RIGHT chest was evaluated and there was NO RIGHT CONSOLIDATION.  L Consolidation: The LEFT chest was evaluated and there was NO LEFT CONSOLIDATION.    Impression:  Thorax: The focused thoracic ultrasound exam was NORMAL.    Comments: A4C, SX not diagnostic given body habitus. PSS shows normal EF. PSL also technically difficult but generally shows normal squeeze. Regarding thoracic, also technically difficult, but does not appear to have a pleural effusion bilaterally or pulmonary edema               Ion Brady MD  09/21/24 2023

## 2024-09-22 NOTE — DISCHARGE INSTRUCTIONS
You had signs of mild to moderate fluid overload here with increased oxygen requirement that improved with 60 mg IV lasix. You urinated a lot, and look improved. You do not require admission as you are back to your oxygen baseline.    We wrote for 30 days of some of your BP meds (amlodipine and losartan). We wrote for 15 days of your lasix (40 mg for 5 days, then 20 mg for 10 days). Please follow up with your outpatient PCP and other teams as planned, with your next scheduled visit 9/26.

## 2024-10-27 ENCOUNTER — APPOINTMENT (OUTPATIENT)
Dept: RADIOLOGY | Facility: HOSPITAL | Age: 49
End: 2024-10-27
Payer: MEDICARE

## 2024-10-27 ENCOUNTER — HOSPITAL ENCOUNTER (INPATIENT)
Facility: HOSPITAL | Age: 49
End: 2024-10-27
Attending: EMERGENCY MEDICINE | Admitting: NURSE PRACTITIONER
Payer: MEDICARE

## 2024-10-27 ENCOUNTER — CLINICAL SUPPORT (OUTPATIENT)
Dept: EMERGENCY MEDICINE | Facility: HOSPITAL | Age: 49
End: 2024-10-27
Payer: MEDICARE

## 2024-10-27 VITALS
HEIGHT: 76 IN | DIASTOLIC BLOOD PRESSURE: 110 MMHG | SYSTOLIC BLOOD PRESSURE: 170 MMHG | BODY MASS INDEX: 38.36 KG/M2 | OXYGEN SATURATION: 94 % | WEIGHT: 315 LBS | HEART RATE: 86 BPM | TEMPERATURE: 96.8 F | RESPIRATION RATE: 18 BRPM

## 2024-10-27 DIAGNOSIS — J43.9 PULMONARY EMPHYSEMA, UNSPECIFIED EMPHYSEMA TYPE (MULTI): ICD-10-CM

## 2024-10-27 DIAGNOSIS — I10 PRIMARY HYPERTENSION: ICD-10-CM

## 2024-10-27 DIAGNOSIS — I50.33 ACUTE ON CHRONIC DIASTOLIC CONGESTIVE HEART FAILURE: ICD-10-CM

## 2024-10-27 DIAGNOSIS — I50.33 ACUTE ON CHRONIC DIASTOLIC CHF (CONGESTIVE HEART FAILURE): Primary | ICD-10-CM

## 2024-10-27 DIAGNOSIS — E11.9 TYPE 2 DIABETES MELLITUS WITHOUT COMPLICATION, WITHOUT LONG-TERM CURRENT USE OF INSULIN (MULTI): ICD-10-CM

## 2024-10-27 PROBLEM — I26.09 ACUTE COR PULMONALE (MULTI): Status: RESOLVED | Noted: 2023-07-11 | Resolved: 2024-10-27

## 2024-10-27 PROBLEM — U07.1 COVID: Status: RESOLVED | Noted: 2023-11-25 | Resolved: 2024-10-27

## 2024-10-27 PROBLEM — N48.6 PEYRONIE'S DISEASE: Status: ACTIVE | Noted: 2024-03-13

## 2024-10-27 PROBLEM — J96.92 RESPIRATORY FAILURE WITH HYPERCAPNIA (MULTI): Status: ACTIVE | Noted: 2024-10-27

## 2024-10-27 LAB
ALBUMIN SERPL BCP-MCNC: 3.5 G/DL (ref 3.4–5)
ALBUMIN SERPL BCP-MCNC: 3.5 G/DL (ref 3.4–5)
ALP SERPL-CCNC: 65 U/L (ref 33–120)
ALT SERPL W P-5'-P-CCNC: 8 U/L (ref 10–52)
ANION GAP SERPL CALC-SCNC: 13 MMOL/L (ref 10–20)
ANION GAP SERPL CALC-SCNC: 8 MMOL/L (ref 10–20)
AST SERPL W P-5'-P-CCNC: 12 U/L (ref 9–39)
ATRIAL RATE: 73 BPM
BASOPHILS # BLD AUTO: 0.02 X10*3/UL (ref 0–0.1)
BASOPHILS NFR BLD AUTO: 0.4 %
BILIRUB SERPL-MCNC: 0.8 MG/DL (ref 0–1.2)
BNP SERPL-MCNC: 19 PG/ML (ref 0–99)
BUN SERPL-MCNC: 14 MG/DL (ref 6–23)
BUN SERPL-MCNC: 15 MG/DL (ref 6–23)
CALCIUM SERPL-MCNC: 8.3 MG/DL (ref 8.6–10.6)
CALCIUM SERPL-MCNC: 8.3 MG/DL (ref 8.6–10.6)
CHLORIDE SERPL-SCNC: 94 MMOL/L (ref 98–107)
CHLORIDE SERPL-SCNC: 96 MMOL/L (ref 98–107)
CO2 SERPL-SCNC: 34 MMOL/L (ref 21–32)
CO2 SERPL-SCNC: 39 MMOL/L (ref 21–32)
CREAT SERPL-MCNC: 1.03 MG/DL (ref 0.5–1.3)
CREAT SERPL-MCNC: 1.05 MG/DL (ref 0.5–1.3)
EGFRCR SERPLBLD CKD-EPI 2021: 87 ML/MIN/1.73M*2
EGFRCR SERPLBLD CKD-EPI 2021: 89 ML/MIN/1.73M*2
EOSINOPHIL # BLD AUTO: 0.09 X10*3/UL (ref 0–0.7)
EOSINOPHIL NFR BLD AUTO: 1.8 %
ERYTHROCYTE [DISTWIDTH] IN BLOOD BY AUTOMATED COUNT: 12.5 % (ref 11.5–14.5)
GLUCOSE BLD MANUAL STRIP-MCNC: 212 MG/DL (ref 74–99)
GLUCOSE SERPL-MCNC: 249 MG/DL (ref 74–99)
GLUCOSE SERPL-MCNC: 251 MG/DL (ref 74–99)
HCT VFR BLD AUTO: 46.5 % (ref 41–52)
HGB BLD-MCNC: 14.4 G/DL (ref 13.5–17.5)
IMM GRANULOCYTES # BLD AUTO: 0.01 X10*3/UL (ref 0–0.7)
IMM GRANULOCYTES NFR BLD AUTO: 0.2 % (ref 0–0.9)
LYMPHOCYTES # BLD AUTO: 1.63 X10*3/UL (ref 1.2–4.8)
LYMPHOCYTES NFR BLD AUTO: 32.3 %
MCH RBC QN AUTO: 29.8 PG (ref 26–34)
MCHC RBC AUTO-ENTMCNC: 31 G/DL (ref 32–36)
MCV RBC AUTO: 96 FL (ref 80–100)
MONOCYTES # BLD AUTO: 0.42 X10*3/UL (ref 0.1–1)
MONOCYTES NFR BLD AUTO: 8.3 %
NEUTROPHILS # BLD AUTO: 2.87 X10*3/UL (ref 1.2–7.7)
NEUTROPHILS NFR BLD AUTO: 57 %
NRBC BLD-RTO: 0 /100 WBCS (ref 0–0)
P AXIS: 59 DEGREES
P OFFSET: 194 MS
P ONSET: 137 MS
PHOSPHATE SERPL-MCNC: 3.3 MG/DL (ref 2.5–4.9)
PLATELET # BLD AUTO: 219 X10*3/UL (ref 150–450)
POTASSIUM SERPL-SCNC: 4.1 MMOL/L (ref 3.5–5.3)
POTASSIUM SERPL-SCNC: 4.1 MMOL/L (ref 3.5–5.3)
PR INTERVAL: 154 MS
PROT SERPL-MCNC: 6.5 G/DL (ref 6.4–8.2)
Q ONSET: 214 MS
QRS COUNT: 12 BEATS
QRS DURATION: 84 MS
QT INTERVAL: 368 MS
QTC CALCULATION(BAZETT): 405 MS
QTC FREDERICIA: 393 MS
R AXIS: 115 DEGREES
RBC # BLD AUTO: 4.83 X10*6/UL (ref 4.5–5.9)
SODIUM SERPL-SCNC: 137 MMOL/L (ref 136–145)
SODIUM SERPL-SCNC: 139 MMOL/L (ref 136–145)
T AXIS: 0 DEGREES
T OFFSET: 398 MS
VENTRICULAR RATE: 73 BPM
WBC # BLD AUTO: 5 X10*3/UL (ref 4.4–11.3)

## 2024-10-27 PROCEDURE — 71046 X-RAY EXAM CHEST 2 VIEWS: CPT | Mod: FOREIGN READ | Performed by: RADIOLOGY

## 2024-10-27 PROCEDURE — 2500000004 HC RX 250 GENERAL PHARMACY W/ HCPCS (ALT 636 FOR OP/ED): Mod: SE

## 2024-10-27 PROCEDURE — 93005 ELECTROCARDIOGRAM TRACING: CPT

## 2024-10-27 PROCEDURE — 2500000005 HC RX 250 GENERAL PHARMACY W/O HCPCS: Performed by: NURSE PRACTITIONER

## 2024-10-27 PROCEDURE — 1210000001 HC SEMI-PRIVATE ROOM DAILY

## 2024-10-27 PROCEDURE — 2500000002 HC RX 250 W HCPCS SELF ADMINISTERED DRUGS (ALT 637 FOR MEDICARE OP, ALT 636 FOR OP/ED): Performed by: NURSE PRACTITIONER

## 2024-10-27 PROCEDURE — 96374 THER/PROPH/DIAG INJ IV PUSH: CPT

## 2024-10-27 PROCEDURE — 99285 EMERGENCY DEPT VISIT HI MDM: CPT | Performed by: EMERGENCY MEDICINE

## 2024-10-27 PROCEDURE — 99223 1ST HOSP IP/OBS HIGH 75: CPT | Performed by: NURSE PRACTITIONER

## 2024-10-27 PROCEDURE — 83880 ASSAY OF NATRIURETIC PEPTIDE: CPT

## 2024-10-27 PROCEDURE — 36415 COLL VENOUS BLD VENIPUNCTURE: CPT

## 2024-10-27 PROCEDURE — 99285 EMERGENCY DEPT VISIT HI MDM: CPT | Mod: 25

## 2024-10-27 PROCEDURE — 80069 RENAL FUNCTION PANEL: CPT | Mod: CCI | Performed by: EMERGENCY MEDICINE

## 2024-10-27 PROCEDURE — 85025 COMPLETE CBC W/AUTO DIFF WBC: CPT

## 2024-10-27 PROCEDURE — 2500000001 HC RX 250 WO HCPCS SELF ADMINISTERED DRUGS (ALT 637 FOR MEDICARE OP): Performed by: NURSE PRACTITIONER

## 2024-10-27 PROCEDURE — 80053 COMPREHEN METABOLIC PANEL: CPT

## 2024-10-27 PROCEDURE — 93010 ELECTROCARDIOGRAM REPORT: CPT | Performed by: EMERGENCY MEDICINE

## 2024-10-27 PROCEDURE — 71046 X-RAY EXAM CHEST 2 VIEWS: CPT

## 2024-10-27 PROCEDURE — 82947 ASSAY GLUCOSE BLOOD QUANT: CPT

## 2024-10-27 RX ORDER — HYDROCHLOROTHIAZIDE 25 MG/1
50 TABLET ORAL DAILY
Status: DISCONTINUED | OUTPATIENT
Start: 2024-10-27 | End: 2024-10-30 | Stop reason: HOSPADM

## 2024-10-27 RX ORDER — DEXTROSE 50 % IN WATER (D50W) INTRAVENOUS SYRINGE
12.5
Status: DISCONTINUED | OUTPATIENT
Start: 2024-10-27 | End: 2024-10-30 | Stop reason: HOSPADM

## 2024-10-27 RX ORDER — FUROSEMIDE 10 MG/ML
60 INJECTION INTRAMUSCULAR; INTRAVENOUS ONCE
Status: DISCONTINUED | OUTPATIENT
Start: 2024-10-27 | End: 2024-10-27

## 2024-10-27 RX ORDER — FUROSEMIDE 10 MG/ML
40 INJECTION INTRAMUSCULAR; INTRAVENOUS ONCE
Status: COMPLETED | OUTPATIENT
Start: 2024-10-27 | End: 2024-10-27

## 2024-10-27 RX ORDER — FUROSEMIDE 10 MG/ML
20 INJECTION INTRAMUSCULAR; INTRAVENOUS ONCE
Status: DISCONTINUED | OUTPATIENT
Start: 2024-10-27 | End: 2024-10-27

## 2024-10-27 RX ORDER — FUROSEMIDE 40 MG/1
40 TABLET ORAL DAILY
Status: DISCONTINUED | OUTPATIENT
Start: 2024-10-27 | End: 2024-10-30 | Stop reason: HOSPADM

## 2024-10-27 RX ORDER — DULAGLUTIDE 1.5 MG/.5ML
1.5 INJECTION, SOLUTION SUBCUTANEOUS WEEKLY
COMMUNITY
Start: 2024-09-26

## 2024-10-27 RX ORDER — DEXTROSE 50 % IN WATER (D50W) INTRAVENOUS SYRINGE
25
Status: DISCONTINUED | OUTPATIENT
Start: 2024-10-27 | End: 2024-10-30 | Stop reason: HOSPADM

## 2024-10-27 RX ORDER — ASPIRIN 81 MG/1
81 TABLET ORAL
COMMUNITY
Start: 2024-09-26 | End: 2024-10-28 | Stop reason: WASHOUT

## 2024-10-27 RX ORDER — INSULIN LISPRO 100 [IU]/ML
0-10 INJECTION, SOLUTION INTRAVENOUS; SUBCUTANEOUS
Status: DISCONTINUED | OUTPATIENT
Start: 2024-10-27 | End: 2024-10-29

## 2024-10-27 RX ORDER — ATORVASTATIN CALCIUM 20 MG/1
20 TABLET, FILM COATED ORAL DAILY
Status: DISCONTINUED | OUTPATIENT
Start: 2024-10-27 | End: 2024-10-30 | Stop reason: HOSPADM

## 2024-10-27 RX ORDER — HYDROCHLOROTHIAZIDE 25 MG/1
50 TABLET ORAL DAILY
Status: DISCONTINUED | OUTPATIENT
Start: 2024-10-27 | End: 2024-10-27

## 2024-10-27 RX ORDER — GABAPENTIN 100 MG/1
100 CAPSULE ORAL EVERY 8 HOURS SCHEDULED
Status: DISCONTINUED | OUTPATIENT
Start: 2024-10-27 | End: 2024-10-28

## 2024-10-27 RX ORDER — ASPIRIN 81 MG/1
81 TABLET ORAL DAILY
Status: DISCONTINUED | OUTPATIENT
Start: 2024-10-27 | End: 2024-10-30 | Stop reason: HOSPADM

## 2024-10-27 RX ORDER — METFORMIN HYDROCHLORIDE 500 MG/1
1000 TABLET ORAL
Status: DISCONTINUED | OUTPATIENT
Start: 2024-10-27 | End: 2024-10-30 | Stop reason: HOSPADM

## 2024-10-27 RX ORDER — FUROSEMIDE 40 MG/1
40 TABLET ORAL DAILY
Status: ON HOLD | COMMUNITY
Start: 2024-09-26 | End: 2024-10-30

## 2024-10-27 RX ORDER — GABAPENTIN 100 MG/1
100 CAPSULE ORAL EVERY 8 HOURS PRN
Status: ON HOLD | COMMUNITY
Start: 2024-09-26 | End: 2024-10-30

## 2024-10-27 RX ORDER — ERGOCALCIFEROL 1.25 MG/1
50000 CAPSULE ORAL WEEKLY
Status: DISCONTINUED | OUTPATIENT
Start: 2024-10-27 | End: 2024-10-30 | Stop reason: HOSPADM

## 2024-10-27 RX ORDER — AMOXICILLIN 250 MG
2 CAPSULE ORAL 2 TIMES DAILY
Status: DISCONTINUED | OUTPATIENT
Start: 2024-10-27 | End: 2024-10-30 | Stop reason: HOSPADM

## 2024-10-27 RX ORDER — ENOXAPARIN SODIUM 100 MG/ML
60 INJECTION SUBCUTANEOUS EVERY 12 HOURS SCHEDULED
Status: DISCONTINUED | OUTPATIENT
Start: 2024-10-27 | End: 2024-10-30 | Stop reason: HOSPADM

## 2024-10-27 RX ORDER — FUROSEMIDE 10 MG/ML
80 INJECTION INTRAMUSCULAR; INTRAVENOUS 2 TIMES DAILY
Status: DISCONTINUED | OUTPATIENT
Start: 2024-10-27 | End: 2024-10-30 | Stop reason: HOSPADM

## 2024-10-27 RX ADMIN — HYDROCHLOROTHIAZIDE 50 MG: 25 TABLET ORAL at 18:19

## 2024-10-27 RX ADMIN — INSULIN LISPRO 4 UNITS: 100 INJECTION, SOLUTION INTRAVENOUS; SUBCUTANEOUS at 18:51

## 2024-10-27 RX ADMIN — Medication 4 L/MIN: at 19:27

## 2024-10-27 RX ADMIN — Medication 4 L/MIN: at 19:28

## 2024-10-27 RX ADMIN — FUROSEMIDE 40 MG: 10 INJECTION, SOLUTION INTRAVENOUS at 15:38

## 2024-10-27 ASSESSMENT — LIFESTYLE VARIABLES
HAVE PEOPLE ANNOYED YOU BY CRITICIZING YOUR DRINKING: NO
EVER HAD A DRINK FIRST THING IN THE MORNING TO STEADY YOUR NERVES TO GET RID OF A HANGOVER: NO
TOTAL SCORE: 0
HAVE YOU EVER FELT YOU SHOULD CUT DOWN ON YOUR DRINKING: NO
EVER FELT BAD OR GUILTY ABOUT YOUR DRINKING: NO

## 2024-10-27 ASSESSMENT — ENCOUNTER SYMPTOMS
NEUROLOGICAL NEGATIVE: 1
GASTROINTESTINAL NEGATIVE: 1
MUSCULOSKELETAL NEGATIVE: 1
WOUND: 1
CARDIOVASCULAR NEGATIVE: 1
EYES NEGATIVE: 1
CONSTITUTIONAL NEGATIVE: 1

## 2024-10-27 ASSESSMENT — PAIN SCALES - GENERAL: PAINLEVEL_OUTOF10: 8

## 2024-10-27 ASSESSMENT — PAIN - FUNCTIONAL ASSESSMENT: PAIN_FUNCTIONAL_ASSESSMENT: 0-10

## 2024-10-27 ASSESSMENT — COLUMBIA-SUICIDE SEVERITY RATING SCALE - C-SSRS
6. HAVE YOU EVER DONE ANYTHING, STARTED TO DO ANYTHING, OR PREPARED TO DO ANYTHING TO END YOUR LIFE?: NO
2. HAVE YOU ACTUALLY HAD ANY THOUGHTS OF KILLING YOURSELF?: NO
1. IN THE PAST MONTH, HAVE YOU WISHED YOU WERE DEAD OR WISHED YOU COULD GO TO SLEEP AND NOT WAKE UP?: NO

## 2024-10-27 NOTE — ED TRIAGE NOTES
Pt presents to ED with BLE swelling and pain ongoing x1 month, no improvement.  Pt seen in ED and had a follow up with PCP. Pt did not take HTN meds today, at baseline wears 4 liters of oxygen but did not bring his tank with him, no distress noted, resp even and unlabored, was 88% on room air, improved to 94% after taking deep breathes

## 2024-10-27 NOTE — H&P
HPI     Mr. Hutson is a 49-year-old male with PMHx: Morbid obesity, HFpEF (EF 60-65% 11/2023), HTN, HLD, T2DM, COPD, YANI who presented to the ED today with complaint of bilateral lower extremities being sore and swollen for the past few months and they are just getting worse.  Also complaint of cough congestion cough is nonproductive.  On exam patient is noted to have a fluid-filled blister on the top of his left foot towards the ankle area.  Bilateral lower extremities also also very edematous and tight 2/2 fluid.  Patient states he dropped hot oil on his left foot about a week ago left foot is slightly red.  Discussed with patient different ways to watch for worsening CHF as patient is morbidly obese and unable to weigh himself on a regular scale.    While in the ED patient was found to have a blood pressure of 199/106 other vitals were otherwise negative.  Labs were mainly negative his glucose was 251.  And CXR showed no acute process.    ROS/EXAM     Review of Systems   Constitutional: Negative.    HENT: Negative.     Eyes: Negative.    Cardiovascular: Negative.    Gastrointestinal: Negative.    Genitourinary: Negative.    Musculoskeletal: Negative.    Skin:  Positive for wound.   Neurological: Negative.      Physical Exam  Vitals reviewed.   Constitutional:       Appearance: Normal appearance.   HENT:      Head: Normocephalic.      Mouth/Throat:      Mouth: Mucous membranes are dry.   Eyes:      Extraocular Movements: Extraocular movements intact.      Conjunctiva/sclera: Conjunctivae normal.      Pupils: Pupils are equal, round, and reactive to light.   Cardiovascular:      Rate and Rhythm: Normal rate and regular rhythm.      Pulses: Normal pulses.      Heart sounds: Normal heart sounds, S1 normal and S2 normal.   Pulmonary:      Effort: Pulmonary effort is normal.      Breath sounds: Normal breath sounds and air entry.   Abdominal:      General: Abdomen is flat. Bowel sounds are normal.      Palpations:  "Abdomen is soft.   Musculoskeletal:         General: Normal range of motion.      Cervical back: Full passive range of motion without pain and normal range of motion.      Right lower leg: Edema present.      Left lower leg: Edema present.   Skin:     General: Skin is warm and dry.      Comments: Left anterior foot blister  Left foot redness   Neurological:      General: No focal deficit present.      Mental Status: He is alert and oriented to person, place, and time. Mental status is at baseline.   Psychiatric:         Attention and Perception: Attention normal.         Mood and Affect: Mood normal.         Speech: Speech normal.         Histories     Past Medical History:   Diagnosis Date   • Acute cor pulmonale (Multi) 07/11/2023   • CHF (congestive heart failure)    • COPD (chronic obstructive pulmonary disease) (Multi)      No past surgical history on file.  No family history on file.   reports that he has never smoked. He has never been exposed to tobacco smoke. He has never used smokeless tobacco. He reports that he does not currently use alcohol. He reports that he does not use drugs.    Vitals/LABS/RESULTS     Last Recorded Vitals  Blood pressure (!) 158/99, pulse 88, temperature 36 °C (96.8 °F), temperature source Temporal, resp. rate 18, height 1.93 m (6' 4\"), weight (!) 227 kg (500 lb), SpO2 95%.  Intake/Output last 3 Shifts:  No intake/output data recorded.    Relevant Results  Lab Results   Component Value Date    WBC 5.0 10/27/2024    HGB 14.4 10/27/2024    HCT 46.5 10/27/2024    MCV 96 10/27/2024     10/27/2024      Lab Results   Component Value Date    GLUCOSE 251 (H) 10/27/2024    CALCIUM 8.3 (L) 10/27/2024     10/27/2024    K 4.1 10/27/2024    CO2 39 (H) 10/27/2024    CL 96 (L) 10/27/2024    BUN 15 10/27/2024    CREATININE 1.03 10/27/2024     XR chest 2 views    Result Date: 10/27/2024  STUDY: Chest Radiographs;  10/27/2024 1:53 PM INDICATION: Shortness of breath. COMPARISON: XR Chest " 9/21/2024 ACCESSION NUMBER(S): [id number] ORDERING CLINICIAN: EDGAR QUICK TECHNIQUE:  Frontal and lateral chest. FINDINGS: CARDIOMEDIASTINAL SILHOUETTE: Cardiomediastinal silhouette is normal in size and configuration.  LUNGS: Lungs are clear.  ABDOMEN: No remarkable upper abdominal findings.  BONES: No acute osseous changes.    No acute process. Signed by Anthony Lemus MD      Medications     Scheduled medications  aspirin, 81 mg, oral, Daily  atorvastatin, 20 mg, oral, Daily  enoxaparin, 60 mg, subcutaneous, q12h NADER  ergocalciferol, 50,000 Units, oral, Weekly  furosemide, 40 mg, oral, Daily  gabapentin, 100 mg, oral, q8h NADER  hydroCHLOROthiazide, 50 mg, oral, Daily  [Held by provider] metFORMIN, 1,000 mg, oral, BID  sennosides-docusate sodium, 2 tablet, oral, BID      Continuous medications     PRN medications      PLAN     Mr. Hutson is a 49-year-old male with PMHx: Morbid obesity, HFpEF (EF 60-65% 11/2023), HTN, HLD, T2DM, COPD, YANI who presented to the ED today with complaint of bilateral lower extremities being sore and swollen for the past few months and they are just getting worse.  Also complaint of cough congestion cough is nonproductive.  On exam patient is noted to have a fluid-filled blister on the top of his left foot towards the ankle area.  Bilateral lower extremities also also very edematous and tight 2/2 fluid.  Patient states he dropped hot oil on his left foot about a week ago left foot is slightly red.  Discussed with patient different ways to watch for worsening CHF as patient is morbidly obese and unable to weigh himself on a regular scale.    While in the ED patient was found to have a blood pressure of 199/106 other vitals were otherwise negative.  Labs were mainly negative his glucose was 251.  And CXR showed no acute process.  Assessment/Plan:    Acute on chronic HFpEF  -EF 60-65% 11/2023  -Patient given a total of 120 mg IV Lasix in the ED  -Will order Lasix IV 80 mg twice  daily  -Strict I's/O Daily weights if possible  -RFP BID while on IV lasix for electrolyte management    Left foot wound--burn?  -Will consult wound nurse and appreciate recs    Hypertensive emergency  HTN  HLD  -Hold Home Hydrochlorothiazide 50 mg daily  -Continue home atorvastatin 20 mg daily  -Continue home aspirin 81 mg daily    T2DM  Neuropathy  -Will hold home metformin and Trulicity  -Moderate Humalog SSI 3 times daily and at bedtime while in the hospital  -Gabapentin 100 mg 3 times daily    -COPD  -YANI  -Will order CPAP as at home  -Wears 4 L nasal cannula oxygen at home baseline        Fluids: monitor and replete as needed  Electrolytes: monitor and replete as needed  Nutrition: Cardiac diet  GI prophylaxis: as needed  DVT prophylaxis: SCD  Code Status:   PCP  Pharmacy    Disposition:  Admitted for above diagnoses. Plan per above. Anticipate hospitalization >2 midnights.       Bhavana Aceves, APRN-CNP         I spent 75 minutes in the professional and overall care on this encounter before, during and after the visit, examining the patient, reviewing labs, writing orders and documenting the note

## 2024-10-27 NOTE — ED PROVIDER NOTES
Emergency Department Provider Note        History of Present Illness     History provided by: Patient  Limitations to History: None  External Records Reviewed with Brief Summary:  ED note from 9/21, patient presented with bilateral leg swelling and shortness of breath.  Patient was treated with Lasix and DuoNeb's with improvement back to baseline.  He was discharged.    HPI:  Eugene Hutson is a 49 y.o. male with past medical history significant for COPD on 4 L at baseline, chronic hypercapnic and hypoxic respiratory failure secondary to COPD, chronic diastolic CHF, hypertension, YANI, presenting to the ED with bilateral lower extremity swelling and pain.  Patient notes symptoms have been ongoing for few months.  He takes Lasix 40 and hydrochlorothiazide but reports these have not been effective for his swelling.  On questioning, patient admits to not being compliant with his Lasix.  Reporting occasional weeping of his bilateral legs.  Denies fever, chills, chest pain, shortness of breath.  No abdominal pain.  No urinary symptoms.    Patient also reporting a burn on the dorsal aspect of his left foot  He spilled hot water a week ago, and now has a painful blister.    Physical Exam   Triage vitals:  T 36 °C (96.8 °F)  HR 87  BP (!) 199/106  RR 17  O2 94 % (88% on room air) None (Room air)    General: Awake, alert, in no acute distress  Eyes: Gaze conjugate.  No scleral icterus or injection  HENT: Normo-cephalic, atraumatic. No stridor  CV: Regular rate, regular rhythm. Radial pulses 2+ bilaterally  Resp: Breathing non-labored, speaking in full sentences.  Clear to auscultation bilaterally  GI: Soft, non-distended, non-tender. No rebound or guarding.  MSK/Extremities: No gross bony deformities. Moving all extremities  Feet: Left foot with large 3 to 4 cm superficial blister over the dorsal aspect.  No surrounding erythema or evidence of infection.  No other obvious blistering.  Skin: Significant bilateral pitting  edema bilaterally below the knees, with evidence of superficial weeping.  Neuro: Alert. Oriented. Face symmetric. Speech is fluent.  Gross strength and sensation intact in b/l UE and LEs  Psych: Appropriate mood and affect    Medical Decision Making & ED Course   Medical Decision Makin y.o. male with past medical history as noted above, presenting the ED with bilateral lower extremity swelling and pain.  Patient is hypertensive at 199/106 on arrival (has not taken his antihypertensives today), otherwise vitals unremarkable.  On exam, significant bilateral pitting edema below the knees, with superficial weeping.  No obvious open wounds.  Appreciate 3 to 4 cm blister over the dorsal aspect of the left foot, blister still intact.  At this point, will let the blister pop on its own.  Patient's swelling likely related to medication noncompliance and congestive heart failure.  Labs ordered.  Low concern for DVT at this point considering both legs are similar in size, and patient has had this for several months.  Will treat with IV Lasix, and reassess.     Patient feeling improved with IV Lasix, blood pressure decreased to 160/100.  Patient was admitted to medicine for inability to ambulate and further evaluation and treatment of bilateral lower extremity edema.  ----      Differential diagnoses considered include but are not limited to: DVT, idiopathic pedal edema, acute exacerbation of CHF     Social Determinants of Health which Significantly Impact Care: None identified     EKG Independent Interpretation: The EKG obtained at 1452 was independently interpreted by myself. It demonstrates normal sinus rhythm with a ventricular rate of 73.  Normal axis. Intervals normal. ST segments showed no elevation or other signs of ischemia.  No significant changes when compared to prior EKG from .     Independent Result Review and Interpretation: Relevant laboratory and radiographic results were reviewed and independently  interpreted by myself.  As necessary, they are commented on in the ED Course.    Chronic conditions affecting the patient's care: As documented above in [facility]    The patient was discussed with the following consultants/services: Admission Coordinator who accepted the patient for admission    Care Considerations: As documented above in [facility]    ED Course:  ED Course as of 10/27/24 1823   Sun Oct 27, 2024   1414 ATTENDING ATTESTATION  49-year-old male presenting to the emergency department with a complaint of tense lower extremity symmetric edema consistent with heart failure exacerbation.  The patient states he is intermittently compliant with his Lasix at home, denies any new shortness of breath does have a baseline history of COPD on 4 L of home oxygen and his vital signs are otherwise stable besides impressive hypertension.  No resting chest pain, he denies any shortness of breath aside from with activity which is typical for him as well as positional.  The patient has a large body habitus difficult to ascertain if he has luz jugular venous distention but he does demonstrate impressive symmetric lower extremity pitting edema superficial weeping.  The patient does have a blister development serosanguineous bulla over the dorsum of the left foot, states that he spilled hot water on it, there is some surrounding warmth certainly no sign of infection, no other blisters were appreciated we will leave this intact.  We will diurese the patient with IV diuretics for symptom management, the edema is so impressive that it is making it difficult for the patient to ambulate and engage in activities of daily living anticipate need for IV diuretics and admission.  [facility] [RH]   1822 Comprehensive metabolic panel(!)  Hyperglycemic, 251, otherwise unremarkable. [NM]   1822 CBC and Auto Differential(!)  Unremarkable. [NM]   1823 XR chest 2 views  Unremarkable for acute cardiopulmonary process. [NM]      ED Course User Index  [NM]  Vida Marin DO  [RH] Peter Walker DO         Diagnoses as of 10/27/24 1823   Acute on chronic diastolic CHF (congestive heart failure)     Disposition   As a result of their workup, the patient will require admission to the hospital.  The patient was informed of his diagnosis.  The patient was given the opportunity to ask questions and I answered them. The patient agreed to be admitted to the hospital.    Procedures   Procedures    This was a shared visit with an ED attending.  The patient was seen and discussed with the ED attending    Vida Marin DO  Emergency Medicine     Vida Marin DO  Resident  10/27/24 1823

## 2024-10-28 LAB
ALBUMIN SERPL BCP-MCNC: 3.8 G/DL (ref 3.4–5)
ALBUMIN SERPL BCP-MCNC: 4.1 G/DL (ref 3.4–5)
ANION GAP SERPL CALC-SCNC: 11 MMOL/L (ref 10–20)
ANION GAP SERPL CALC-SCNC: 12 MMOL/L (ref 10–20)
BUN SERPL-MCNC: 16 MG/DL (ref 6–23)
BUN SERPL-MCNC: 17 MG/DL (ref 6–23)
CALCIUM SERPL-MCNC: 8.1 MG/DL (ref 8.6–10.6)
CALCIUM SERPL-MCNC: 8.9 MG/DL (ref 8.6–10.6)
CHLORIDE SERPL-SCNC: 88 MMOL/L (ref 98–107)
CHLORIDE SERPL-SCNC: 90 MMOL/L (ref 98–107)
CO2 SERPL-SCNC: 37 MMOL/L (ref 21–32)
CO2 SERPL-SCNC: 41 MMOL/L (ref 21–32)
CREAT SERPL-MCNC: 1.12 MG/DL (ref 0.5–1.3)
CREAT SERPL-MCNC: 1.18 MG/DL (ref 0.5–1.3)
EGFRCR SERPLBLD CKD-EPI 2021: 76 ML/MIN/1.73M*2
EGFRCR SERPLBLD CKD-EPI 2021: 81 ML/MIN/1.73M*2
ERYTHROCYTE [DISTWIDTH] IN BLOOD BY AUTOMATED COUNT: 12.6 % (ref 11.5–14.5)
GLUCOSE BLD MANUAL STRIP-MCNC: 244 MG/DL (ref 74–99)
GLUCOSE BLD MANUAL STRIP-MCNC: 252 MG/DL (ref 74–99)
GLUCOSE BLD MANUAL STRIP-MCNC: 276 MG/DL (ref 74–99)
GLUCOSE SERPL-MCNC: 244 MG/DL (ref 74–99)
GLUCOSE SERPL-MCNC: 283 MG/DL (ref 74–99)
HCT VFR BLD AUTO: 47.8 % (ref 41–52)
HGB BLD-MCNC: 15.3 G/DL (ref 13.5–17.5)
MAGNESIUM SERPL-MCNC: 1.59 MG/DL (ref 1.6–2.4)
MAGNESIUM SERPL-MCNC: 2.11 MG/DL (ref 1.6–2.4)
MCH RBC QN AUTO: 30.8 PG (ref 26–34)
MCHC RBC AUTO-ENTMCNC: 32 G/DL (ref 32–36)
MCV RBC AUTO: 96 FL (ref 80–100)
NRBC BLD-RTO: 0 /100 WBCS (ref 0–0)
PHOSPHATE SERPL-MCNC: 4.2 MG/DL (ref 2.5–4.9)
PHOSPHATE SERPL-MCNC: 4.5 MG/DL (ref 2.5–4.9)
PLATELET # BLD AUTO: 239 X10*3/UL (ref 150–450)
POTASSIUM SERPL-SCNC: 3.7 MMOL/L (ref 3.5–5.3)
POTASSIUM SERPL-SCNC: 7.4 MMOL/L (ref 3.5–5.3)
RBC # BLD AUTO: 4.96 X10*6/UL (ref 4.5–5.9)
SODIUM SERPL-SCNC: 132 MMOL/L (ref 136–145)
SODIUM SERPL-SCNC: 136 MMOL/L (ref 136–145)
WBC # BLD AUTO: 6.5 X10*3/UL (ref 4.4–11.3)

## 2024-10-28 PROCEDURE — 85027 COMPLETE CBC AUTOMATED: CPT | Performed by: NURSE PRACTITIONER

## 2024-10-28 PROCEDURE — 36415 COLL VENOUS BLD VENIPUNCTURE: CPT | Performed by: NURSE PRACTITIONER

## 2024-10-28 PROCEDURE — 2500000004 HC RX 250 GENERAL PHARMACY W/ HCPCS (ALT 636 FOR OP/ED): Performed by: NURSE PRACTITIONER

## 2024-10-28 PROCEDURE — 2500000001 HC RX 250 WO HCPCS SELF ADMINISTERED DRUGS (ALT 637 FOR MEDICARE OP): Performed by: INTERNAL MEDICINE

## 2024-10-28 PROCEDURE — 80069 RENAL FUNCTION PANEL: CPT | Performed by: NURSE PRACTITIONER

## 2024-10-28 PROCEDURE — 2500000002 HC RX 250 W HCPCS SELF ADMINISTERED DRUGS (ALT 637 FOR MEDICARE OP, ALT 636 FOR OP/ED): Performed by: INTERNAL MEDICINE

## 2024-10-28 PROCEDURE — 83735 ASSAY OF MAGNESIUM: CPT | Performed by: INTERNAL MEDICINE

## 2024-10-28 PROCEDURE — 2500000005 HC RX 250 GENERAL PHARMACY W/O HCPCS: Performed by: NURSE PRACTITIONER

## 2024-10-28 PROCEDURE — 2500000001 HC RX 250 WO HCPCS SELF ADMINISTERED DRUGS (ALT 637 FOR MEDICARE OP): Performed by: NURSE PRACTITIONER

## 2024-10-28 PROCEDURE — 1210000001 HC SEMI-PRIVATE ROOM DAILY

## 2024-10-28 PROCEDURE — 99232 SBSQ HOSP IP/OBS MODERATE 35: CPT | Performed by: INTERNAL MEDICINE

## 2024-10-28 PROCEDURE — 82947 ASSAY GLUCOSE BLOOD QUANT: CPT

## 2024-10-28 PROCEDURE — 2500000002 HC RX 250 W HCPCS SELF ADMINISTERED DRUGS (ALT 637 FOR MEDICARE OP, ALT 636 FOR OP/ED): Performed by: NURSE PRACTITIONER

## 2024-10-28 RX ORDER — POTASSIUM CHLORIDE 20 MEQ/1
20 TABLET, EXTENDED RELEASE ORAL ONCE
Status: COMPLETED | OUTPATIENT
Start: 2024-10-28 | End: 2024-10-28

## 2024-10-28 RX ORDER — LOSARTAN POTASSIUM 50 MG/1
50 TABLET ORAL DAILY
Status: DISCONTINUED | OUTPATIENT
Start: 2024-10-28 | End: 2024-10-30 | Stop reason: HOSPADM

## 2024-10-28 RX ORDER — GABAPENTIN 100 MG/1
100 CAPSULE ORAL EVERY 8 HOURS PRN
Status: DISCONTINUED | OUTPATIENT
Start: 2024-10-28 | End: 2024-10-30 | Stop reason: HOSPADM

## 2024-10-28 RX ADMIN — GABAPENTIN 100 MG: 100 CAPSULE ORAL at 11:05

## 2024-10-28 RX ADMIN — ATORVASTATIN CALCIUM 20 MG: 20 TABLET, FILM COATED ORAL at 08:01

## 2024-10-28 RX ADMIN — FUROSEMIDE 80 MG: 10 INJECTION, SOLUTION INTRAVENOUS at 08:01

## 2024-10-28 RX ADMIN — FUROSEMIDE 80 MG: 10 INJECTION, SOLUTION INTRAVENOUS at 21:15

## 2024-10-28 RX ADMIN — INSULIN LISPRO 6 UNITS: 100 INJECTION, SOLUTION INTRAVENOUS; SUBCUTANEOUS at 16:28

## 2024-10-28 RX ADMIN — SENNOSIDES AND DOCUSATE SODIUM 2 TABLET: 50; 8.6 TABLET ORAL at 08:01

## 2024-10-28 RX ADMIN — SENNOSIDES AND DOCUSATE SODIUM 2 TABLET: 50; 8.6 TABLET ORAL at 21:15

## 2024-10-28 RX ADMIN — HYDROCHLOROTHIAZIDE 50 MG: 25 TABLET ORAL at 08:01

## 2024-10-28 RX ADMIN — INSULIN LISPRO 4 UNITS: 100 INJECTION, SOLUTION INTRAVENOUS; SUBCUTANEOUS at 11:22

## 2024-10-28 RX ADMIN — GABAPENTIN 100 MG: 100 CAPSULE ORAL at 05:51

## 2024-10-28 RX ADMIN — ENOXAPARIN SODIUM 60 MG: 60 INJECTION SUBCUTANEOUS at 21:15

## 2024-10-28 RX ADMIN — Medication 4 L/MIN: at 21:24

## 2024-10-28 RX ADMIN — POTASSIUM CHLORIDE 20 MEQ: 1500 TABLET, EXTENDED RELEASE ORAL at 17:34

## 2024-10-28 RX ADMIN — ENOXAPARIN SODIUM 60 MG: 60 INJECTION SUBCUTANEOUS at 08:01

## 2024-10-28 RX ADMIN — INSULIN LISPRO 6 UNITS: 100 INJECTION, SOLUTION INTRAVENOUS; SUBCUTANEOUS at 07:46

## 2024-10-28 RX ADMIN — Medication 4 L/MIN: at 07:00

## 2024-10-28 RX ADMIN — ASPIRIN 81 MG: 81 TABLET, COATED ORAL at 08:01

## 2024-10-28 SDOH — SOCIAL STABILITY: SOCIAL INSECURITY: DO YOU FEEL ANYONE HAS EXPLOITED OR TAKEN ADVANTAGE OF YOU FINANCIALLY OR OF YOUR PERSONAL PROPERTY?: NO

## 2024-10-28 SDOH — SOCIAL STABILITY: SOCIAL INSECURITY: ARE THERE ANY APPARENT SIGNS OF INJURIES/BEHAVIORS THAT COULD BE RELATED TO ABUSE/NEGLECT?: NO

## 2024-10-28 SDOH — SOCIAL STABILITY: SOCIAL INSECURITY: DO YOU FEEL UNSAFE GOING BACK TO THE PLACE WHERE YOU ARE LIVING?: NO

## 2024-10-28 SDOH — SOCIAL STABILITY: SOCIAL INSECURITY: HAVE YOU HAD THOUGHTS OF HARMING ANYONE ELSE?: NO

## 2024-10-28 SDOH — ECONOMIC STABILITY: FOOD INSECURITY: WITHIN THE PAST 12 MONTHS, THE FOOD YOU BOUGHT JUST DIDN'T LAST AND YOU DIDN'T HAVE MONEY TO GET MORE.: NEVER TRUE

## 2024-10-28 SDOH — SOCIAL STABILITY: SOCIAL INSECURITY: WITHIN THE LAST YEAR, HAVE YOU BEEN HUMILIATED OR EMOTIONALLY ABUSED IN OTHER WAYS BY YOUR PARTNER OR EX-PARTNER?: NO

## 2024-10-28 SDOH — ECONOMIC STABILITY: INCOME INSECURITY: IN THE PAST 12 MONTHS HAS THE ELECTRIC, GAS, OIL, OR WATER COMPANY THREATENED TO SHUT OFF SERVICES IN YOUR HOME?: NO

## 2024-10-28 SDOH — SOCIAL STABILITY: SOCIAL INSECURITY: ABUSE: ADULT

## 2024-10-28 SDOH — SOCIAL STABILITY: SOCIAL INSECURITY: HAVE YOU HAD ANY THOUGHTS OF HARMING ANYONE ELSE?: NO

## 2024-10-28 SDOH — SOCIAL STABILITY: SOCIAL INSECURITY: WITHIN THE LAST YEAR, HAVE YOU BEEN AFRAID OF YOUR PARTNER OR EX-PARTNER?: NO

## 2024-10-28 SDOH — SOCIAL STABILITY: SOCIAL INSECURITY
WITHIN THE LAST YEAR, HAVE YOU BEEN KICKED, HIT, SLAPPED, OR OTHERWISE PHYSICALLY HURT BY YOUR PARTNER OR EX-PARTNER?: NO

## 2024-10-28 SDOH — SOCIAL STABILITY: SOCIAL INSECURITY: WERE YOU ABLE TO COMPLETE ALL THE BEHAVIORAL HEALTH SCREENINGS?: YES

## 2024-10-28 SDOH — ECONOMIC STABILITY: FOOD INSECURITY: WITHIN THE PAST 12 MONTHS, YOU WORRIED THAT YOUR FOOD WOULD RUN OUT BEFORE YOU GOT THE MONEY TO BUY MORE.: NEVER TRUE

## 2024-10-28 SDOH — SOCIAL STABILITY: SOCIAL INSECURITY
WITHIN THE LAST YEAR, HAVE YOU BEEN RAPED OR FORCED TO HAVE ANY KIND OF SEXUAL ACTIVITY BY YOUR PARTNER OR EX-PARTNER?: NO

## 2024-10-28 SDOH — SOCIAL STABILITY: SOCIAL INSECURITY: DOES ANYONE TRY TO KEEP YOU FROM HAVING/CONTACTING OTHER FRIENDS OR DOING THINGS OUTSIDE YOUR HOME?: NO

## 2024-10-28 SDOH — SOCIAL STABILITY: SOCIAL INSECURITY: HAS ANYONE EVER THREATENED TO HURT YOUR FAMILY OR YOUR PETS?: NO

## 2024-10-28 SDOH — SOCIAL STABILITY: SOCIAL INSECURITY: ARE YOU OR HAVE YOU BEEN THREATENED OR ABUSED PHYSICALLY, EMOTIONALLY, OR SEXUALLY BY ANYONE?: NO

## 2024-10-28 ASSESSMENT — LIFESTYLE VARIABLES
SKIP TO QUESTIONS 9-10: 1
HOW OFTEN DO YOU HAVE A DRINK CONTAINING ALCOHOL: NEVER
AUDIT-C TOTAL SCORE: 0
HOW MANY STANDARD DRINKS CONTAINING ALCOHOL DO YOU HAVE ON A TYPICAL DAY: PATIENT DOES NOT DRINK
AUDIT-C TOTAL SCORE: 0
HOW OFTEN DO YOU HAVE 6 OR MORE DRINKS ON ONE OCCASION: NEVER

## 2024-10-28 ASSESSMENT — ACTIVITIES OF DAILY LIVING (ADL)
GROOMING: INDEPENDENT
LACK_OF_TRANSPORTATION: NO
BATHING: INDEPENDENT
PATIENT'S MEMORY ADEQUATE TO SAFELY COMPLETE DAILY ACTIVITIES?: YES
FEEDING YOURSELF: INDEPENDENT
DRESSING YOURSELF: INDEPENDENT
WALKS IN HOME: INDEPENDENT
ADEQUATE_TO_COMPLETE_ADL: YES
HEARING - RIGHT EAR: FUNCTIONAL
HEARING - LEFT EAR: FUNCTIONAL
JUDGMENT_ADEQUATE_SAFELY_COMPLETE_DAILY_ACTIVITIES: YES
TOILETING: INDEPENDENT

## 2024-10-28 ASSESSMENT — PAIN - FUNCTIONAL ASSESSMENT
PAIN_FUNCTIONAL_ASSESSMENT: 0-10
PAIN_FUNCTIONAL_ASSESSMENT: 0-10

## 2024-10-28 ASSESSMENT — PATIENT HEALTH QUESTIONNAIRE - PHQ9
1. LITTLE INTEREST OR PLEASURE IN DOING THINGS: NOT AT ALL
2. FEELING DOWN, DEPRESSED OR HOPELESS: NOT AT ALL
SUM OF ALL RESPONSES TO PHQ9 QUESTIONS 1 & 2: 0

## 2024-10-28 ASSESSMENT — COGNITIVE AND FUNCTIONAL STATUS - GENERAL
DAILY ACTIVITIY SCORE: 24
MOBILITY SCORE: 24
PATIENT BASELINE BEDBOUND: NO

## 2024-10-28 ASSESSMENT — PAIN SCALES - GENERAL
PAINLEVEL_OUTOF10: 0 - NO PAIN
PAINLEVEL_OUTOF10: 0 - NO PAIN

## 2024-10-28 NOTE — PROGRESS NOTES
Pharmacy Medication History Review    Eugene Hutson is a 49 y.o. male admitted for Acute on chronic diastolic CHF (congestive heart failure). Pharmacy reviewed the patient's [id number] medications and allergies for accuracy.    Medications ADDED:  None  Medications CHANGED:  None  Medications REMOVED:   Aspirin     The list below reflects the updated PTA list.   Prior to Admission Medications   Prescriptions Last Dose Informant   Trulicity 1.5 mg/0.5 mL pen injector injection 10/21/2024 Self   Sig: Inject 1.5 mg under the skin 1 (one) time per week. On Monday   albuterol 90 mcg/actuation inhaler Past Week Self   Sig: Inhale 2 puffs every 4 hours if needed for wheezing or shortness of breath.   atorvastatin (Lipitor) 20 mg tablet 10/26/2024    Sig: Take 1 tablet (20 mg) by mouth once daily.   ergocalciferol (Vitamin D-2) 1.25 MG (56001 UT) capsule More than a month Self   Sig: Take 1 capsule (50,000 Units) by mouth once a week.   Patient not taking: Reported on 10/28/2024  Patient has not taken in more than a month and needs refills.    furosemide (Lasix) 40 mg tablet 10/26/2024 Self   Sig: Take 1 tablet (40 mg) by mouth once daily.   gabapentin (Neurontin) 100 mg capsule 10/27/2024 Self   Sig: Take 1 capsule (100 mg) by mouth every 8 hours if needed.   hydroCHLOROthiazide (HYDRODiuril) 50 mg tablet 10/26/2024 Self   Sig: TAKE 1 TABLET BY MOUTH ONCE DAILY   metFORMIN (Glucophage) 1,000 mg tablet 10/26/2024 Self   Sig: Take 1 tablet (1,000 mg) by mouth 2 times daily (morning and late afternoon).      Facility-Administered Medications: None        The list below reflects the updated allergy list. Please review each documented allergy for additional clarification and justification.  Allergies  Reviewed by Jose Bryant, Som on 10/28/2024   No Known Allergies         Patient accepts M2B at discharge.     Sources:   [facility]  Pharmacy dispense history  Patient interview Good historian  Chart  "Review    Additional Comments:  The patient reported taking amlodipine 10 mg daily and losartan 50 mg daily PTA with last doses on Saturday 10/26/24. However, both medications were discontinued on admission and removed from PTA medications list.      Jose Bryant, PharmD  Transitions of Care Pharmacist  10/28/24     Secure Chat preferred   If no response call [phone] or PrintToPeer \"Med Rec\"    "

## 2024-10-28 NOTE — PROGRESS NOTES
Eugene Hutson is a 49 y.o. male on day 1 of admission presenting with Acute on chronic diastolic CHF (congestive heart failure).      Subjective   Reports no SOB at rest, no cough, no expectoration.     Still reporting bilateral leg swelling, with blisters.   Reports using CPAP at night, ( doesn't remember the CPAP settings)       Objective     Last Recorded Vitals  /83 (BP Location: Right arm, Patient Position: Sitting)   Pulse 88   Temp 36.4 °C (97.5 °F) (Temporal)   Resp 18   Wt (!) 227 kg (500 lb)   SpO2 90%   Intake/Output last 3 Shifts:  No intake or output data in the 24 hours ending 10/28/24 1724    Admission Weight  Weight: (!) 227 kg (500 lb) (10/27/24 1257)    Daily Weight  10/27/24 : (!) 227 kg (500 lb)    Image Results  ECG 12 lead  Normal sinus rhythm  Left posterior fascicular block  Inferior infarct (cited on or before 21-SEP-2024)  Abnormal ECG  When compared with ECG of 21-SEP-2024 19:19,  Left posterior fascicular block is now Present    See ED provider note for full interpretation and clinical correlation  Confirmed by Mecca Simons ([id number]) on 10/27/2024 11:19:32 PM  XR chest 2 views  Narrative: STUDY:  Chest Radiographs;  10/27/2024 1:53 PM  INDICATION:  Shortness of breath.  COMPARISON:  XR Chest 9/21/2024  ACCESSION NUMBER(S):  [id number]  ORDERING CLINICIAN:  EDGAR QUICK  TECHNIQUE:  Frontal and lateral chest.   FINDINGS:  CARDIOMEDIASTINAL SILHOUETTE:  Cardiomediastinal silhouette is normal in size and configuration.     LUNGS:  Lungs are clear.     ABDOMEN:  No remarkable upper abdominal findings.     BONES:  No acute osseous changes.  Impression: No acute process.  Signed by Anthony Lemus MD    Vitals:    10/28/24 1446   BP: 128/83   Pulse: 88   Resp: 18   Temp: 36.4 °C (97.5 °F)   SpO2: 90%       Physical Exam  Constitutional:       Appearance: He is obese.      Comments: Comfortably breathing on RA    HENT:      Head: Normocephalic.      Nose: Nose normal.   Eyes:       Extraocular Movements: Extraocular movements intact.      Pupils: Pupils are equal, round, and reactive to light.   Cardiovascular:      Rate and Rhythm: Normal rate and regular rhythm.      Heart sounds: Normal heart sounds. No murmur heard.  Pulmonary:      Effort: No respiratory distress.      Breath sounds: Normal breath sounds. No wheezing.   Abdominal:      General: There is no distension.      Palpations: Abdomen is soft.      Tenderness: There is no abdominal tenderness.   Musculoskeletal:         General: Normal range of motion.      Cervical back: Normal range of motion.      Right lower leg: Edema present.      Left lower leg: Edema present.      Comments: Bilateral leg swelling,    Skin:     Comments: Blisters noted on both legs    Neurological:      General: No focal deficit present.      Mental Status: He is alert and oriented to person, place, and time.   Psychiatric:         Mood and Affect: Mood normal.         Relevant Results  Scheduled medications  aspirin, 81 mg, oral, Daily  atorvastatin, 20 mg, oral, Daily  enoxaparin, 60 mg, subcutaneous, q12h NADER  ergocalciferol, 50,000 Units, oral, Weekly  furosemide, 80 mg, intravenous, BID  [Held by provider] furosemide, 40 mg, oral, Daily  [Held by provider] hydroCHLOROthiazide, 50 mg, oral, Daily  insulin lispro, 0-10 Units, subcutaneous, TID  [Held by provider] metFORMIN, 1,000 mg, oral, BID  oxygen, , inhalation, Continuous - Inhalation  potassium chloride CR, 20 mEq, oral, Once  sennosides-docusate sodium, 2 tablet, oral, BID      Continuous medications     PRN medications  PRN medications: dextrose, dextrose, gabapentin, glucagon, glucagon    Results for orders placed or performed during the hospital encounter of 10/27/24 (from the past 24 hours)   POCT GLUCOSE   Result Value Ref Range    POCT Glucose 212 (H) 74 - 99 mg/dL   Renal Function Panel   Result Value Ref Range    Glucose 283 (H) 74 - 99 mg/dL    Sodium 132 (L) 136 - 145 mmol/L    Potassium  7.4 (HH) 3.5 - 5.3 mmol/L    Chloride 90 (L) 98 - 107 mmol/L    Bicarbonate 37 (H) 21 - 32 mmol/L    Anion Gap 12 10 - 20 mmol/L    Urea Nitrogen 16 6 - 23 mg/dL    Creatinine 1.12 0.50 - 1.30 mg/dL    eGFR 81 >60 mL/min/1.73m*2    Calcium 8.1 (L) 8.6 - 10.6 mg/dL    Phosphorus 4.5 2.5 - 4.9 mg/dL    Albumin 4.1 3.4 - 5.0 g/dL   CBC   Result Value Ref Range    WBC 6.5 4.4 - 11.3 x10*3/uL    nRBC 0.0 0.0 - 0.0 /100 WBCs    RBC 4.96 4.50 - 5.90 x10*6/uL    Hemoglobin 15.3 13.5 - 17.5 g/dL    Hematocrit 47.8 41.0 - 52.0 %    MCV 96 80 - 100 fL    MCH 30.8 26.0 - 34.0 pg    MCHC 32.0 32.0 - 36.0 g/dL    RDW 12.6 11.5 - 14.5 %    Platelets 239 150 - 450 x10*3/uL   POCT GLUCOSE   Result Value Ref Range    POCT Glucose 276 (H) 74 - 99 mg/dL   POCT GLUCOSE   Result Value Ref Range    POCT Glucose 244 (H) 74 - 99 mg/dL   Renal function panel   Result Value Ref Range    Glucose 244 (H) 74 - 99 mg/dL    Sodium 136 136 - 145 mmol/L    Potassium 3.7 3.5 - 5.3 mmol/L    Chloride 88 (L) 98 - 107 mmol/L    Bicarbonate 41 (HH) 21 - 32 mmol/L    Anion Gap 11 10 - 20 mmol/L    Urea Nitrogen 17 6 - 23 mg/dL    Creatinine 1.18 0.50 - 1.30 mg/dL    eGFR 76 >60 mL/min/1.73m*2    Calcium 8.9 8.6 - 10.6 mg/dL    Phosphorus 4.2 2.5 - 4.9 mg/dL    Albumin 3.8 3.4 - 5.0 g/dL   POCT GLUCOSE   Result Value Ref Range    POCT Glucose 252 (H) 74 - 99 mg/dL       Assessment/Plan      Eugene Hutson is a 49-year-old male with PMH of Morbid obesity, HFpEF (EF 60-65% 11/2023), HTN, HLD, T2DM, COPD, and YANI on CPAP who is presented to the [facility] ED with c/o bilateral lower extremity edema for the past few months and they are just getting worse.  Patient is noted to have a fluid-filled blister on the top of his left foot towards the ankle area.  Bilateral lower extremities also also very edematous and tight 2/2 fluid.  Patient states he dropped hot oil on his left foot about a week ago left foot is slightly red.  While in the ED patient was found to have  a blood pressure of 199/106 other vitals were otherwise negative.  Labs were mainly stable except for elevated glucose of 251.  And CXR showed no acute process. Suspect symptoms due to decompensated CHF, and scald to left foot.        Acute on chronic HFpEF  -EF 60-65% 11/2023  -Patient given a total of 120 mg IV Lasix in the ED  Started on Lasix IV 80 mg twice daily  -Strict I's/O Daily weights if possible  Monitor Potassium, Magnesium  ECHO,   HF consult tomorrow,      Left foot wound--due to hot oil burn.   wound nurse consulted and appreciate recs     Hypertensive emergency  HTN  HLD  -Holding  Home Hydrochlorothiazide 50 mg daily, while on IV lasix  PRN Hydralazine 25 mg Q 6hrs, for BP > 180/90,   -Continue home atorvastatin 20 mg daily  -Continue home aspirin 81 mg daily     T2DM  Neuropathy  -Will hold home metformin and Trulicity  Humalog SSI 3 times daily and at bedtime while in the hospital  -Gabapentin 100 mg 3 times daily PRN,      -COPD  -YANI  -Will order CPAP as at home  -Wears 4 L nasal cannula oxygen at home baseline           Fluids: monitor and replete as needed  Electrolytes: monitor and replete as needed  Nutrition: Cardiac diet  GI prophylaxis: as needed  DVT prophylaxis: SCD  Code Status:   PCP  Pharmacy     Disposition: Pending improvement in leg edema, HF recs.     Ace Lynn MD

## 2024-10-28 NOTE — CONSULTS
Wound Care Consult     Visit Date: 10/28/2024      Patient Name: Eugene Hutson         MRN: [id number]           YOB: 1975     Reason for Consult: left foot burn/blister      Wound History: Patient states he accidentally dropped hot grease on ankle while cooking.     Wound Assessment:  Wound 10/28/24 Burn Ankle Left;Anterior (Active)   Wound Image   10/28/24 1611   Burn Assessment Clean 10/28/24 1611   Burn Wound Characteristics Blanching;Blisters 10/28/24 1611   Wound Length (cm) 4.5 cm 10/28/24 1611   Wound Width (cm) 6 cm 10/28/24 1611   Wound Surface Area (cm^2) 27 cm^2 10/28/24 1611   Burn Treatment Adaptic 10/28/24 1611   Color Yellow 10/28/24 1611   Burn Drainage Serous 10/28/24 1611   Burn Odor Absent 10/28/24 1611   Drainage Amount None 10/28/24 1611   Dressing Petroleum gauze;Silicone border dressing 10/28/24 1611   Dressing Changed New 10/28/24 1611   Dressing Status Clean;Dry;Occlusive 10/28/24 1611     Wound location: Left foot/ anterior ankle   Recommendations: EVERY OTHER DAY. Irrigate with normal saline or wound cleanser cover wound with 2 layers of adaptic ([employer] order #[id number]) and Cover with Mepilex border dressing.     Wound Team Plan:  Primary provider please review the wound care consult note and pending wound care order. If you agree with orders please file in EMR.      While inpatient, Secure chat with questions or if condition changes. For urgent communications please message the [facility] Care Team through [employer].       Mila Cali RN, CWON  10/28/2024  4:13 PM

## 2024-10-28 NOTE — CARE PLAN
The patient's goals for the shift include      The clinical goals for the shift include Patient will maintain oxygen level above 88%    Over the shift, the patient did not make progress toward the following goals. Barriers to progression include  Recommendations to address these barriers include

## 2024-10-29 ENCOUNTER — APPOINTMENT (OUTPATIENT)
Dept: CARDIOLOGY | Facility: HOSPITAL | Age: 49
End: 2024-10-29
Payer: MEDICARE

## 2024-10-29 LAB
ALBUMIN SERPL BCP-MCNC: 3.5 G/DL (ref 3.4–5)
ALP SERPL-CCNC: 71 U/L (ref 33–120)
ALT SERPL W P-5'-P-CCNC: 6 U/L (ref 10–52)
ANION GAP SERPL CALC-SCNC: 12 MMOL/L (ref 10–20)
AORTIC VALVE PEAK VELOCITY: 1.7 M/S
AST SERPL W P-5'-P-CCNC: 12 U/L (ref 9–39)
AV PEAK GRADIENT: 11.6 MMHG
AVA (PEAK VEL): 2.79 CM2
BILIRUB SERPL-MCNC: 0.8 MG/DL (ref 0–1.2)
BUN SERPL-MCNC: 18 MG/DL (ref 6–23)
CALCIUM SERPL-MCNC: 8.7 MG/DL (ref 8.6–10.6)
CHLORIDE SERPL-SCNC: 89 MMOL/L (ref 98–107)
CO2 SERPL-SCNC: 42 MMOL/L (ref 21–32)
CREAT SERPL-MCNC: 1.1 MG/DL (ref 0.5–1.3)
EGFRCR SERPLBLD CKD-EPI 2021: 82 ML/MIN/1.73M*2
EJECTION FRACTION APICAL 4 CHAMBER: 69.1
EJECTION FRACTION: 63 %
ERYTHROCYTE [DISTWIDTH] IN BLOOD BY AUTOMATED COUNT: 12.4 % (ref 11.5–14.5)
GLUCOSE BLD MANUAL STRIP-MCNC: 234 MG/DL (ref 74–99)
GLUCOSE BLD MANUAL STRIP-MCNC: 247 MG/DL (ref 74–99)
GLUCOSE BLD MANUAL STRIP-MCNC: 297 MG/DL (ref 74–99)
GLUCOSE BLD MANUAL STRIP-MCNC: 340 MG/DL (ref 74–99)
GLUCOSE SERPL-MCNC: 236 MG/DL (ref 74–99)
HCT VFR BLD AUTO: 50.8 % (ref 41–52)
HGB BLD-MCNC: 15.4 G/DL (ref 13.5–17.5)
LEFT VENTRICLE INTERNAL DIMENSION DIASTOLE: 5 CM (ref 3.5–6)
LEFT VENTRICULAR OUTFLOW TRACT DIAMETER: 2.3 CM
MCH RBC QN AUTO: 29.6 PG (ref 26–34)
MCHC RBC AUTO-ENTMCNC: 30.3 G/DL (ref 32–36)
MCV RBC AUTO: 98 FL (ref 80–100)
MITRAL VALVE E/A RATIO: 0.81
NRBC BLD-RTO: 0 /100 WBCS (ref 0–0)
PLATELET # BLD AUTO: 262 X10*3/UL (ref 150–450)
POTASSIUM SERPL-SCNC: 3.7 MMOL/L (ref 3.5–5.3)
PROT SERPL-MCNC: 7.1 G/DL (ref 6.4–8.2)
RBC # BLD AUTO: 5.2 X10*6/UL (ref 4.5–5.9)
RIGHT VENTRICLE FREE WALL PEAK S': 19.6 CM/S
SODIUM SERPL-SCNC: 139 MMOL/L (ref 136–145)
WBC # BLD AUTO: 6 X10*3/UL (ref 4.4–11.3)

## 2024-10-29 PROCEDURE — 93306 TTE W/DOPPLER COMPLETE: CPT

## 2024-10-29 PROCEDURE — 93306 TTE W/DOPPLER COMPLETE: CPT | Performed by: INTERNAL MEDICINE

## 2024-10-29 PROCEDURE — 99233 SBSQ HOSP IP/OBS HIGH 50: CPT | Performed by: STUDENT IN AN ORGANIZED HEALTH CARE EDUCATION/TRAINING PROGRAM

## 2024-10-29 PROCEDURE — 2500000004 HC RX 250 GENERAL PHARMACY W/ HCPCS (ALT 636 FOR OP/ED): Performed by: INTERNAL MEDICINE

## 2024-10-29 PROCEDURE — 36415 COLL VENOUS BLD VENIPUNCTURE: CPT | Performed by: INTERNAL MEDICINE

## 2024-10-29 PROCEDURE — 2500000001 HC RX 250 WO HCPCS SELF ADMINISTERED DRUGS (ALT 637 FOR MEDICARE OP): Performed by: INTERNAL MEDICINE

## 2024-10-29 PROCEDURE — 2500000004 HC RX 250 GENERAL PHARMACY W/ HCPCS (ALT 636 FOR OP/ED): Performed by: NURSE PRACTITIONER

## 2024-10-29 PROCEDURE — 85027 COMPLETE CBC AUTOMATED: CPT | Performed by: INTERNAL MEDICINE

## 2024-10-29 PROCEDURE — 1210000001 HC SEMI-PRIVATE ROOM DAILY

## 2024-10-29 PROCEDURE — 80053 COMPREHEN METABOLIC PANEL: CPT | Performed by: INTERNAL MEDICINE

## 2024-10-29 PROCEDURE — 2500000002 HC RX 250 W HCPCS SELF ADMINISTERED DRUGS (ALT 637 FOR MEDICARE OP, ALT 636 FOR OP/ED): Performed by: STUDENT IN AN ORGANIZED HEALTH CARE EDUCATION/TRAINING PROGRAM

## 2024-10-29 PROCEDURE — 84075 ASSAY ALKALINE PHOSPHATASE: CPT | Performed by: INTERNAL MEDICINE

## 2024-10-29 PROCEDURE — 2500000002 HC RX 250 W HCPCS SELF ADMINISTERED DRUGS (ALT 637 FOR MEDICARE OP, ALT 636 FOR OP/ED): Performed by: NURSE PRACTITIONER

## 2024-10-29 PROCEDURE — 82947 ASSAY GLUCOSE BLOOD QUANT: CPT

## 2024-10-29 PROCEDURE — 2500000001 HC RX 250 WO HCPCS SELF ADMINISTERED DRUGS (ALT 637 FOR MEDICARE OP): Performed by: NURSE PRACTITIONER

## 2024-10-29 PROCEDURE — 2500000005 HC RX 250 GENERAL PHARMACY W/O HCPCS: Performed by: NURSE PRACTITIONER

## 2024-10-29 RX ORDER — INSULIN LISPRO 100 [IU]/ML
0-15 INJECTION, SOLUTION INTRAVENOUS; SUBCUTANEOUS
Status: DISCONTINUED | OUTPATIENT
Start: 2024-10-29 | End: 2024-10-30 | Stop reason: HOSPADM

## 2024-10-29 RX ADMIN — FUROSEMIDE 80 MG: 10 INJECTION, SOLUTION INTRAVENOUS at 09:45

## 2024-10-29 RX ADMIN — GABAPENTIN 100 MG: 100 CAPSULE ORAL at 20:35

## 2024-10-29 RX ADMIN — INSULIN LISPRO 6 UNITS: 100 INJECTION, SOLUTION INTRAVENOUS; SUBCUTANEOUS at 18:37

## 2024-10-29 RX ADMIN — INSULIN LISPRO 8 UNITS: 100 INJECTION, SOLUTION INTRAVENOUS; SUBCUTANEOUS at 14:18

## 2024-10-29 RX ADMIN — INSULIN LISPRO 4 UNITS: 100 INJECTION, SOLUTION INTRAVENOUS; SUBCUTANEOUS at 10:45

## 2024-10-29 RX ADMIN — ASPIRIN 81 MG: 81 TABLET, COATED ORAL at 09:45

## 2024-10-29 RX ADMIN — PERFLUTREN 2 ML OF DILUTION: 6.52 INJECTION, SUSPENSION INTRAVENOUS at 12:26

## 2024-10-29 RX ADMIN — Medication 4 L/MIN: at 20:37

## 2024-10-29 RX ADMIN — ENOXAPARIN SODIUM 60 MG: 60 INJECTION SUBCUTANEOUS at 09:46

## 2024-10-29 RX ADMIN — LOSARTAN POTASSIUM 50 MG: 50 TABLET, FILM COATED ORAL at 09:45

## 2024-10-29 RX ADMIN — SENNOSIDES AND DOCUSATE SODIUM 2 TABLET: 50; 8.6 TABLET ORAL at 20:35

## 2024-10-29 RX ADMIN — SENNOSIDES AND DOCUSATE SODIUM 2 TABLET: 50; 8.6 TABLET ORAL at 09:45

## 2024-10-29 RX ADMIN — ATORVASTATIN CALCIUM 20 MG: 20 TABLET, FILM COATED ORAL at 09:45

## 2024-10-29 RX ADMIN — ENOXAPARIN SODIUM 60 MG: 60 INJECTION SUBCUTANEOUS at 20:36

## 2024-10-29 RX ADMIN — Medication 2 L/MIN: at 09:45

## 2024-10-29 RX ADMIN — FUROSEMIDE 80 MG: 10 INJECTION, SOLUTION INTRAVENOUS at 20:36

## 2024-10-29 RX ADMIN — GABAPENTIN 100 MG: 100 CAPSULE ORAL at 09:45

## 2024-10-29 ASSESSMENT — COGNITIVE AND FUNCTIONAL STATUS - GENERAL
DAILY ACTIVITIY SCORE: 24
MOBILITY SCORE: 24
MOBILITY SCORE: 24
DAILY ACTIVITIY SCORE: 24

## 2024-10-29 ASSESSMENT — ACTIVITIES OF DAILY LIVING (ADL): EFFECT OF PAIN ON DAILY ACTIVITIES: DECREASED MOBILITY

## 2024-10-29 ASSESSMENT — PAIN SCALES - GENERAL
PAINLEVEL_OUTOF10: 5 - MODERATE PAIN
PAINLEVEL_OUTOF10: 0 - NO PAIN

## 2024-10-29 ASSESSMENT — PAIN - FUNCTIONAL ASSESSMENT
PAIN_FUNCTIONAL_ASSESSMENT: 0-10
PAIN_FUNCTIONAL_ASSESSMENT: 0-10

## 2024-10-29 NOTE — PROGRESS NOTES
"Eugene Hutson is a 49 y.o. male on hospital day 2 of admission presenting with Acute on chronic diastolic CHF (congestive heart failure).    Subjective     The patient states LE swelling is improving. Creatinine holding will cont. IV diuresis.    Objective     GENERAL APPEARANCE: A&Ox3, appears in no acute distress  HEAD: normocephalic, atraumatic  THROAT: Oral cavity and pharynx normal. No inflammation, swelling, exudate, or lesions.  NECK: Neck supple, non-tender without lymphadenopathy, masses or thyromegaly.  CARDIAC: Normal S1 and S2. No S3, S4 or murmurs. Rhythm is regular. There is 2+ peripheral edema, cyanosis or pallor. Extremities are warm and well perfused. No carotid bruits.  LUNGS: Clear to auscultation bilaterally without rales, rhonchi, wheezing or diminished breath sounds.  ABDOMEN: Positive bowel sounds. Soft, nondistended, nontender. No guarding or rebound. No masses.  EXTREMITIES: No significant deformity or joint abnormality. 2+ edema. Peripheral pulses intact. No varicosities.  SKIN: Skin normal color, texture and turgor with no lesions or rash  PSYCHIATRIC: oriented to person, place, and time, good judgement and reason, without hallucinations, abnormal affect or abnormal behaviors during the examination. Patient is not suicidal.        Last Recorded Vitals  Blood pressure 120/80, pulse 106, temperature 36.5 °C (97.7 °F), resp. rate 17, height 1.93 m (6' 4\"), weight (!) 220 kg (485 lb 8 oz), SpO2 97%.  Intake/Output last 3 Shifts:  No intake/output data recorded.    Relevant Results  Lab Results   Component Value Date    WBC 6.0 10/29/2024    HGB 15.4 10/29/2024    HCT 50.8 10/29/2024    MCV 98 10/29/2024     10/29/2024      Lab Results   Component Value Date    GLUCOSE 236 (H) 10/29/2024    CALCIUM 8.7 10/29/2024     10/29/2024    K 3.7 10/29/2024    CO2 42 (HH) 10/29/2024    CL 89 (L) 10/29/2024    BUN 18 10/29/2024    CREATININE 1.10 10/29/2024     Scheduled " medications  aspirin, 81 mg, oral, Daily  atorvastatin, 20 mg, oral, Daily  enoxaparin, 60 mg, subcutaneous, q12h NADER  ergocalciferol, 50,000 Units, oral, Weekly  furosemide, 80 mg, intravenous, BID  [Held by provider] furosemide, 40 mg, oral, Daily  [Held by provider] hydroCHLOROthiazide, 50 mg, oral, Daily  insulin lispro, 0-15 Units, subcutaneous, TID  losartan, 50 mg, oral, Daily  [Held by provider] metFORMIN, 1,000 mg, oral, BID  oxygen, , inhalation, Continuous - Inhalation  sennosides-docusate sodium, 2 tablet, oral, BID      Continuous medications     PRN medications  PRN medications: dextrose, dextrose, gabapentin, glucagon, glucagon    Assessment/Plan     Eugene Hutson is a 49-year-old male with PMH of Morbid obesity, HFpEF (EF 60-65% 11/2023), HTN, HLD, T2DM, COPD, and YANI on CPAP who is presented to the [facility] ED with c/o bilateral lower extremity edema for the past few months and they are just getting worse.  Patient is noted to have a fluid-filled blister on the top of his left foot towards the ankle area.  Bilateral lower extremities also also very edematous and tight 2/2 fluid.  Patient states he dropped hot oil on his left foot about a week ago left foot is slightly red.  While in the ED patient was found to have a blood pressure of 199/106 other vitals were otherwise negative.  Labs were mainly stable except for elevated glucose of 251.  And CXR showed no acute process. Suspect symptoms due to decompensated CHF, and scald to left foot.         Acute on chronic HFpEF  - EF 60-65% 11/2023  - Patient given a total of 120 mg IV Lasix in the ED  - Started on Lasix IV 80 mg twice daily, will cont. And switch to PO tomorrow  - Strict I's/O Daily weights if possible  - Monitor Potassium, Magnesium  - Echo performed, awaiting read  - Will consider HF consult based on Echo results     Left foot wound--due to hot oil burn.   - wound nurse consulted and appreciate recs     Hypertensive emergency  HTN  HLD  -  Holding  Home Hydrochlorothiazide 50 mg daily, while on IV lasix  - PRN Hydralazine 25 mg Q 6hrs, for BP > 180/90,   - Continue home atorvastatin 20 mg daily  - Continue home aspirin 81 mg daily     T2DM  Neuropathy  - Will hold home metformin and Trulicity  Humalog SSI 3 times daily and at bedtime while in the hospital  - Gabapentin 100 mg 3 times daily PRN,      COPD  YANI  - Cont. Nightly cpap  - Wears 4 L nasal cannula oxygen at home baseline        Nutrition: Cardiac diet  GI prophylaxis: as needed  DVT prophylaxis: SCD  Code Status: Full Code  Dispo: will cont. IV diuresis as he is responding well, awaiting echo results    Alfie Mari MD     The patient encounter includes all but not limited to; Evaluation of laboratory results, pertinent imaging, and vital signs. Daily updates are discussed with any consulting services and family/medical power of  as needed. The patient's discharge  process begins at admission and daily contact with the patient's TCC and SW is pertinent in their efficient and safe discharge.

## 2024-10-29 NOTE — SIGNIFICANT EVENT
Rapid Response Nurse Note: RADAR alert: 6    Pager time:   Arrival time:   Event end time:   Location: [address]  [x] Triage by phone or secure messaging    Rapid response initiated by:  [] Rapid response RN [] Family [] Nursing Supervisor [] Physician   [x] RADAR auto page [] Sepsis auto-page [] RN [] RT   [] NP/PA [] Other:     Primary reason for call:   [] BAT [] New CPAP/BiPAP [] Bleeding [] Change in mental status   [] Chest pain [] Code blue [] FiO2 >/= 50% [] HR </= 40 bpm   [] HR >/= 130 bpm [] Hyperglycemia [] Hypoglycemia [x] RADAR    [] RR </= 8 bpm [] RR >/= 30 bpm [] SBP </= 90 mmHg [] SpO2 < 90%   [] Seizure [] Sepsis [] Shorness of breath  [] Staff concern: see comments     Initial VS and/or RADAR VS: T 36.6 °C; HR 91; RR 18; /89; SPO2 88% on supplemental oxygen.  Providers present at bedside (if applicable): N/A    Interventions:  [x] None [] ABG/VBG [] Assist w/ICU transfer [] BAT paged    [] Bag mask [] Blood [] Cardioversion [] Code Blue   [] Code blue for intubation [] Code status changed [] Chest x-ray [] EKG   [] IV fluid/bolus [] KUB x-ray [] Labs/cultures [] Medication   [] Nebulizer treatment [] NIPPV (CPAP/BiPAP) [] Oxygen [] Oral airway   [] Peripheral IV [] Palliative care consult [] CT/MRI [] Sepsis protocol    [] Suctioned [] Other:     Name of ICU Provider contacted (if applicable): N/A  Outcome:  [] Coded and  [] Code blue for intubation [] Coded and transferred to ICU []  on division   [x] Remained on division (no change) [] Remained on division + additional monitoring [] Remained in ED [] Transferred to ED   [] Transferred to ICU [] Transferred to inpatient status [] Transferred for interventions (procedure) [] Transferred to ICU stepdown    [] Transferred to surgery [] Transferred to telemetry [] Sepsis protocol [] STEMI protocol   [] Stroke protocol [x] Bedside nurse instructed to page rapid response for any concerns or acute change in condition/VS      Additional Comments: Reviewed above VS with bedside RN via phone.  SPO2 verified by bedside RN: 95% on supplemental oxygen.  Staff to page rapid response for any concerns or acute change in condition or VS.

## 2024-10-29 NOTE — CARE PLAN
Problem: Heart Failure  Goal: Improved urinary output this shift  Outcome: Progressing  Goal: Reduction in peripheral edema within 24 hours  Outcome: Progressing  Goal: Report improvement of dyspnea/breathlessness this shift  Outcome: Progressing  Goal: Increase self care and/or family involvement in 24 hours  Outcome: Met      The clinical goals for the shift include Patient will adhere to strick I&O and tolerate heart monitor this shift.

## 2024-10-29 NOTE — CARE PLAN
Problem: Pain - Adult  Goal: Verbalizes/displays adequate comfort level or baseline comfort level  Outcome: Progressing     Problem: Safety - Adult  Goal: Free from fall injury  Outcome: Progressing     Problem: Discharge Planning  Goal: Discharge to home or other facility with appropriate resources  Outcome: Progressing     Problem: Chronic Conditions and Co-morbidities  Goal: Patient's chronic conditions and co-morbidity symptoms are monitored and maintained or improved  Outcome: Progressing     Problem: Heart Failure  Goal: Improved gas exchange this shift  Outcome: Progressing  Goal: Improved urinary output this shift  Outcome: Progressing  Goal: Reduction in peripheral edema within 24 hours  Outcome: Progressing  Goal: Report improvement of dyspnea/breathlessness this shift  Outcome: Progressing  Goal: Weight from fluid excess reduced over 2-3 days, then stabilize  Outcome: Progressing     Problem: Diabetes  Goal: Achieve decreasing blood glucose levels by end of shift  Outcome: Progressing  Goal: Increase stability of blood glucose readings by end of shift  Outcome: Progressing  Goal: Decrease in ketones present in urine by end of shift  Outcome: Progressing  Goal: Maintain electrolyte levels within acceptable range throughout shift  Outcome: Progressing  Goal: Maintain glucose levels >70mg/dl to <250mg/dl throughout shift  Outcome: Progressing  Goal: No changes in neurological exam by end of shift  Outcome: Progressing  Goal: Learn about and adhere to nutrition recommendations by end of shift  Outcome: Progressing  Goal: Vital signs within normal range for age by end of shift  Outcome: Progressing  Goal: Increase self care and/or family involovement by end of shift  Outcome: Progressing  Goal: Receive DSME education by end of shift  Outcome: Progressing   The patient's goals for the shift include      The clinical goals for the shift include Patient will remain safe and free from falls throughout shift

## 2024-10-30 VITALS
WEIGHT: 315 LBS | TEMPERATURE: 97.9 F | HEART RATE: 87 BPM | SYSTOLIC BLOOD PRESSURE: 127 MMHG | BODY MASS INDEX: 38.36 KG/M2 | RESPIRATION RATE: 19 BRPM | DIASTOLIC BLOOD PRESSURE: 70 MMHG | OXYGEN SATURATION: 94 % | HEIGHT: 76 IN

## 2024-10-30 LAB
ALBUMIN SERPL BCP-MCNC: 3.4 G/DL (ref 3.4–5)
ANION GAP SERPL CALC-SCNC: ABNORMAL MMOL/L
BUN SERPL-MCNC: 20 MG/DL (ref 6–23)
CALCIUM SERPL-MCNC: 8.7 MG/DL (ref 8.6–10.6)
CHLORIDE SERPL-SCNC: 88 MMOL/L (ref 98–107)
CO2 SERPL-SCNC: >45 MMOL/L (ref 21–32)
CREAT SERPL-MCNC: 1.02 MG/DL (ref 0.5–1.3)
EGFRCR SERPLBLD CKD-EPI 2021: 90 ML/MIN/1.73M*2
GLUCOSE BLD MANUAL STRIP-MCNC: 232 MG/DL (ref 74–99)
GLUCOSE BLD MANUAL STRIP-MCNC: 306 MG/DL (ref 74–99)
GLUCOSE BLD MANUAL STRIP-MCNC: 440 MG/DL (ref 74–99)
GLUCOSE BLD MANUAL STRIP-MCNC: 440 MG/DL (ref 74–99)
GLUCOSE SERPL-MCNC: 230 MG/DL (ref 74–99)
PHOSPHATE SERPL-MCNC: 4.2 MG/DL (ref 2.5–4.9)
POTASSIUM SERPL-SCNC: 3.6 MMOL/L (ref 3.5–5.3)
SODIUM SERPL-SCNC: 140 MMOL/L (ref 136–145)

## 2024-10-30 PROCEDURE — 2500000005 HC RX 250 GENERAL PHARMACY W/O HCPCS: Performed by: NURSE PRACTITIONER

## 2024-10-30 PROCEDURE — 2500000002 HC RX 250 W HCPCS SELF ADMINISTERED DRUGS (ALT 637 FOR MEDICARE OP, ALT 636 FOR OP/ED): Performed by: STUDENT IN AN ORGANIZED HEALTH CARE EDUCATION/TRAINING PROGRAM

## 2024-10-30 PROCEDURE — 36415 COLL VENOUS BLD VENIPUNCTURE: CPT | Performed by: STUDENT IN AN ORGANIZED HEALTH CARE EDUCATION/TRAINING PROGRAM

## 2024-10-30 PROCEDURE — 84100 ASSAY OF PHOSPHORUS: CPT | Performed by: STUDENT IN AN ORGANIZED HEALTH CARE EDUCATION/TRAINING PROGRAM

## 2024-10-30 PROCEDURE — 82947 ASSAY GLUCOSE BLOOD QUANT: CPT

## 2024-10-30 PROCEDURE — 94660 CPAP INITIATION&MGMT: CPT

## 2024-10-30 PROCEDURE — 2500000001 HC RX 250 WO HCPCS SELF ADMINISTERED DRUGS (ALT 637 FOR MEDICARE OP): Performed by: INTERNAL MEDICINE

## 2024-10-30 PROCEDURE — 99239 HOSP IP/OBS DSCHRG MGMT >30: CPT | Performed by: STUDENT IN AN ORGANIZED HEALTH CARE EDUCATION/TRAINING PROGRAM

## 2024-10-30 PROCEDURE — 2500000004 HC RX 250 GENERAL PHARMACY W/ HCPCS (ALT 636 FOR OP/ED): Performed by: NURSE PRACTITIONER

## 2024-10-30 PROCEDURE — 2500000001 HC RX 250 WO HCPCS SELF ADMINISTERED DRUGS (ALT 637 FOR MEDICARE OP): Performed by: NURSE PRACTITIONER

## 2024-10-30 RX ORDER — DEXTROSE 50 % IN WATER (D50W) INTRAVENOUS SYRINGE
12.5
Status: DISCONTINUED | OUTPATIENT
Start: 2024-10-30 | End: 2024-10-30 | Stop reason: HOSPADM

## 2024-10-30 RX ORDER — ATORVASTATIN CALCIUM 20 MG/1
20 TABLET, FILM COATED ORAL DAILY
Qty: 30 TABLET | Refills: 1 | Status: SHIPPED | OUTPATIENT
Start: 2024-10-30 | End: 2024-12-29

## 2024-10-30 RX ORDER — LOSARTAN POTASSIUM 50 MG/1
50 TABLET ORAL DAILY
Qty: 30 TABLET | Refills: 1 | Status: SHIPPED | OUTPATIENT
Start: 2024-10-30 | End: 2024-12-29

## 2024-10-30 RX ORDER — ALBUTEROL SULFATE 90 UG/1
2 INHALANT RESPIRATORY (INHALATION) EVERY 4 HOURS PRN
Qty: 18 G | Refills: 0 | Status: SHIPPED | OUTPATIENT
Start: 2024-10-30

## 2024-10-30 RX ORDER — ASPIRIN 81 MG/1
81 TABLET ORAL
Qty: 30 TABLET | Refills: 1 | Status: SHIPPED | OUTPATIENT
Start: 2024-10-30 | End: 2024-12-29

## 2024-10-30 RX ORDER — DEXTROSE 50 % IN WATER (D50W) INTRAVENOUS SYRINGE
25
Status: DISCONTINUED | OUTPATIENT
Start: 2024-10-30 | End: 2024-10-30 | Stop reason: HOSPADM

## 2024-10-30 RX ORDER — GABAPENTIN 100 MG/1
100 CAPSULE ORAL 3 TIMES DAILY
Qty: 90 CAPSULE | Refills: 0 | Status: SHIPPED | OUTPATIENT
Start: 2024-10-30 | End: 2024-11-29

## 2024-10-30 RX ORDER — METFORMIN HYDROCHLORIDE 1000 MG/1
1000 TABLET ORAL
Qty: 60 TABLET | Refills: 1 | Status: SHIPPED | OUTPATIENT
Start: 2024-10-30 | End: 2024-12-29

## 2024-10-30 RX ORDER — FUROSEMIDE 40 MG/1
40 TABLET ORAL
Qty: 60 TABLET | Refills: 1 | Status: SHIPPED | OUTPATIENT
Start: 2024-10-30 | End: 2024-12-29

## 2024-10-30 RX ORDER — INSULIN LISPRO 100 [IU]/ML
18 INJECTION, SOLUTION INTRAVENOUS; SUBCUTANEOUS ONCE
Status: COMPLETED | OUTPATIENT
Start: 2024-10-30 | End: 2024-10-30

## 2024-10-30 RX ORDER — AMOXICILLIN 250 MG
2 CAPSULE ORAL 2 TIMES DAILY
Qty: 20 TABLET | Refills: 0 | Status: SHIPPED | OUTPATIENT
Start: 2024-10-30

## 2024-10-30 RX ADMIN — LOSARTAN POTASSIUM 50 MG: 50 TABLET, FILM COATED ORAL at 09:47

## 2024-10-30 RX ADMIN — Medication 4 L/MIN: at 09:46

## 2024-10-30 RX ADMIN — GABAPENTIN 100 MG: 100 CAPSULE ORAL at 09:47

## 2024-10-30 RX ADMIN — ENOXAPARIN SODIUM 60 MG: 60 INJECTION SUBCUTANEOUS at 09:47

## 2024-10-30 RX ADMIN — INSULIN LISPRO 6 UNITS: 100 INJECTION, SOLUTION INTRAVENOUS; SUBCUTANEOUS at 09:47

## 2024-10-30 RX ADMIN — FUROSEMIDE 80 MG: 10 INJECTION, SOLUTION INTRAVENOUS at 09:47

## 2024-10-30 RX ADMIN — INSULIN LISPRO 18 UNITS: 100 INJECTION, SOLUTION INTRAVENOUS; SUBCUTANEOUS at 12:09

## 2024-10-30 RX ADMIN — SENNOSIDES AND DOCUSATE SODIUM 2 TABLET: 50; 8.6 TABLET ORAL at 09:47

## 2024-10-30 RX ADMIN — ATORVASTATIN CALCIUM 20 MG: 20 TABLET, FILM COATED ORAL at 09:47

## 2024-10-30 RX ADMIN — ASPIRIN 81 MG: 81 TABLET, COATED ORAL at 09:47

## 2024-10-30 ASSESSMENT — PAIN SCALES - GENERAL
PAINLEVEL_OUTOF10: 0 - NO PAIN

## 2024-10-30 ASSESSMENT — PAIN - FUNCTIONAL ASSESSMENT: PAIN_FUNCTIONAL_ASSESSMENT: 0-10

## 2024-10-30 NOTE — PROGRESS NOTES
10/30/24 1116   Discharge Planning   Home or Post Acute Services None   Expected Discharge Disposition Home   Does the patient need discharge transport arranged? No       Transitional Care Coordination Progress Note:  Patient discussed during interdisciplinary rounds.   Team members present: MD and TCC  Plan per Medical/Surgical team: Patient is medically ready for discharge. Patient car is onsite and will transport himself home. CHW will see patient prior to discharge to provide rental assistance. Patient wears 4 liters of home oxygen. Asked patient if he has his portable concentrator onsite and he stated no. Stated he doesn't need the home oxygen to transport home. Asked patient if he knows his oxygen supplier so that I can get a tank sent to bedside. Patient stated his home oxygen was arranged while he was in [facility] and he doesn't know his supplier. Patient stated he will not wait for an oxygen tank and that he will discharge home without one. Patient informed about the risk of driving with no supplemental oxygen, patient verbalized understanding.     IMM signed and placed in chart.    Payor: [employer]  Discharge disposition: home  Potential Barriers: 10/30  ADOD:

## 2024-10-30 NOTE — CARE PLAN
CHW Note  CHW consulted to meet with pt to discuss housing and utility resources.  CHW attempted to meet with pt but pt was being seen by medical team at bedside.  CHW to attempt to follow up at bedside shortly.  Cinthia Olivia, [facility]

## 2024-10-30 NOTE — CARE PLAN
CHW Note  CHW consulted to follow up with patient to discuss rent and utility support.  Met with pt at bedside, introduced self and role.  Pt states his main concern at the moment is getting caught up with rent and obtaining furniture for his home, denies utility need at this time.  Pt states he does not have internet access but expressed being able to visit a public library to use a computer.  Provided pt information on [employer] [facility]'s rent stability program which re-opens 11/4.  Also provided pt with a list of local housing orgs, free/reduced cost furniture resources ([employer] for free furniture and free delivery based on availability), and the [facility] for on site social service referrals.  Pt states that he currently receives SNAP.  Pt has a car and is able to get around independently.  Screened for additional SDOH need and determined no further need at this time. Left all resources as well as my contact with pt at his bedside.  Discussed the following topics on behalf of the patient:  []  Behavioral Health Assistance     []  Case Management  []   Assistance  []  Digital Equity Assistance  []  Dental Health Assistance  []  Education Assistance  []  Employment Assistance  [x]  Financial Strain Relief Assistance  []  Food Insecurity Assistance  []  Healthcare Coverage Assistance  [x]  Housing Stability Assistance  []  IP Violence Relief Assistance  []  Legal Assistance  []  Physical Activity Assistance  []  Social Connection Assistance  []  Stress Relief Assistance   []  Substance Abuse Assistance  []  Transportation Assistance  []  Utility Assistance  []  Other: [insert comment here]    Cinthia Olivia [facility] Center[facility]IT[facility]

## 2024-10-30 NOTE — NURSING NOTE
Patient discharged to home today. He verbalized an understanding of the AVS instructions after review with this nurse. His IV was removed with tip intact. His belongings were packed. His prescriptions were sent to [facility] pharmacy. He was on oxygen at the time of AVS review but stated he did not need a portable oxygen tank to wear oxygen home. He said he has oxygen at home already to wear once he gets there. He drove here and had his car in the ER parking lot so he transported himself home.

## 2024-12-01 NOTE — DISCHARGE SUMMARY
Discharge Diagnosis  Acute on chronic diastolic CHF (congestive heart failure)    Issues Requiring Follow-Up  N/A    Discharge Meds     Medication List      START taking these medications     losartan 50 mg tablet; Commonly known as: Cozaar; Take 1 tablet (50 mg)   by mouth once daily.   oxygen gas therapy; Commonly known as: O2; Inhale 1 each every 12 hours.   sennosides-docusate sodium 8.6-50 mg tablet; Commonly known as:   Rosalba-Colace; Take 2 tablets by mouth 2 times a day.     CHANGE how you take these medications     furosemide 40 mg tablet; Commonly known as: Lasix; Take 1 tablet (40 mg)   by mouth 2 times daily (morning and late afternoon).; What changed: when   to take this   gabapentin 100 mg capsule; Commonly known as: Neurontin; Take 1 capsule   (100 mg) by mouth 3 times a day.; What changed: when to take this, reasons   to take this     CONTINUE taking these medications     albuterol 90 mcg/actuation inhaler; Inhale 2 puffs every 4 hours if   needed for wheezing or shortness of breath.   aspirin 81 mg EC tablet; Take 1 tablet (81 mg) by mouth once daily.   atorvastatin 20 mg tablet; Commonly known as: Lipitor; Take 1 tablet (20   mg) by mouth once daily.   metFORMIN 1,000 mg tablet; Commonly known as: Glucophage; Take 1 tablet   (1,000 mg) by mouth 2 times daily (morning and late afternoon).   Trulicity 1.5 mg/0.5 mL pen injector injection; Generic drug:   dulaglutide     STOP taking these medications     ergocalciferol 1.25 MG (99456 UT) capsule; Commonly known as: Vitamin   D-2   hydroCHLOROthiazide 50 mg tablet; Commonly known as: HYDRODiuril       Test Results Pending At Discharge  N/A    Hospital Course    Eugene Hutson is a 49-year-old male with PMH of Morbid obesity, HFpEF (EF 60-65% 11/2023), HTN, HLD, T2DM, COPD, and YANI on CPAP who is presented to the [facility] ED with c/o bilateral lower extremity edema for the past few months and they are just getting worse.  Patient is noted to have a  fluid-filled blister on the top of his left foot towards the ankle area.  Bilateral lower extremities also also very edematous and tight 2/2 fluid.  Patient states he dropped hot oil on his left foot about a week ago left foot is slightly red.  While in the ED patient was found to have a blood pressure of 199/106 other vitals were otherwise negative.  Labs were mainly stable except for elevated glucose of 251.  And CXR showed no acute process. Suspect symptoms due to decompensated CHF, and scald to left foot.         Acute on chronic HFpEF  - EF 60-65% 11/2023  - Patient given a total of 120 mg IV Lasix in the ED  - Started on Lasix IV 80 mg twice daily  - Will discharge on Lasix 40mg BID  - Echo shows normal EF     Left foot wound--due to hot oil burn.   - Does not appear infected     Hypertensive emergency  HTN  HLD  - Cont. Home hydrochlorothiazide on discharge  - PRN Hydralazine 25 mg Q 6hrs, for BP > 180/90,   - Continue home atorvastatin 20 mg daily  - Continue home aspirin 81 mg daily    The patient was admitted with LE edema. Cardiac work-up including TTE showed a normal EF. The patient was given IV lasix for several days with improvement of edema. He will discharge on lasix 40mg BID.     Pertinent Physical Exam At Time of Discharge    GENERAL APPEARANCE: A&Ox3, appears in no acute distress  HEAD: normocephalic, atraumatic  THROAT: Oral cavity and pharynx normal. No inflammation, swelling, exudate, or lesions.  NECK: Neck supple, non-tender without lymphadenopathy, masses or thyromegaly.  CARDIAC: Normal S1 and S2. No S3, S4 or murmurs. Rhythm is regular. There is 1+ peripheral edema, cyanosis or pallor. Extremities are warm and well perfused. No carotid bruits.  LUNGS: Clear to auscultation bilaterally without rales, rhonchi, wheezing or diminished breath sounds.  ABDOMEN: Positive bowel sounds. Soft, nondistended, nontender. No guarding or rebound. No masses.  EXTREMITIES: No significant deformity or joint  abnormality. 1+ edema. Peripheral pulses intact. No varicosities.  SKIN: Skin normal color, texture and turgor with no lesions or rash  PSYCHIATRIC: oriented to person, place, and time, good judgement and reason, without hallucinations, abnormal affect or abnormal behaviors during the examination. Patient is not suicidal.        Outpatient Follow-Up  No future appointments.      Jean-Claude Mari MD

## 2024-12-15 ENCOUNTER — HOSPITAL ENCOUNTER (INPATIENT)
Facility: HOSPITAL | Age: 49
LOS: 4 days | Discharge: HOME | End: 2024-12-20
Attending: EMERGENCY MEDICINE | Admitting: NURSE PRACTITIONER
Payer: MEDICARE

## 2024-12-15 ENCOUNTER — APPOINTMENT (OUTPATIENT)
Dept: RADIOLOGY | Facility: HOSPITAL | Age: 49
End: 2024-12-15
Payer: MEDICARE

## 2024-12-15 ENCOUNTER — CLINICAL SUPPORT (OUTPATIENT)
Dept: EMERGENCY MEDICINE | Facility: HOSPITAL | Age: 49
End: 2024-12-15
Payer: MEDICARE

## 2024-12-15 DIAGNOSIS — I50.33 ACUTE ON CHRONIC DIASTOLIC CHF (CONGESTIVE HEART FAILURE): Primary | ICD-10-CM

## 2024-12-15 DIAGNOSIS — I50.9 HEART FAILURE, UNSPECIFIED: ICD-10-CM

## 2024-12-15 DIAGNOSIS — I50.33 ACUTE ON CHRONIC DIASTOLIC CONGESTIVE HEART FAILURE: ICD-10-CM

## 2024-12-15 DIAGNOSIS — E11.9 TYPE 2 DIABETES MELLITUS WITHOUT COMPLICATION, WITHOUT LONG-TERM CURRENT USE OF INSULIN (MULTI): ICD-10-CM

## 2024-12-15 DIAGNOSIS — I10 PRIMARY HYPERTENSION: ICD-10-CM

## 2024-12-15 LAB
ALBUMIN SERPL BCP-MCNC: 4.1 G/DL (ref 3.4–5)
ALP SERPL-CCNC: 83 U/L (ref 33–120)
ALT SERPL W P-5'-P-CCNC: 8 U/L (ref 10–52)
ANION GAP BLDV CALCULATED.4IONS-SCNC: 0 MMOL/L (ref 10–25)
ANION GAP SERPL CALC-SCNC: 12 MMOL/L (ref 10–20)
AST SERPL W P-5'-P-CCNC: 11 U/L (ref 9–39)
ATRIAL RATE: 174 BPM
ATRIAL RATE: 99 BPM
BASE EXCESS BLDV CALC-SCNC: 16.9 MMOL/L (ref -2–3)
BASOPHILS # BLD AUTO: 0.02 X10*3/UL (ref 0–0.1)
BASOPHILS NFR BLD AUTO: 0.3 %
BILIRUB SERPL-MCNC: 0.7 MG/DL (ref 0–1.2)
BNP SERPL-MCNC: 21 PG/ML (ref 0–99)
BODY TEMPERATURE: 37 DEGREES CELSIUS
BUN SERPL-MCNC: 12 MG/DL (ref 6–23)
CA-I BLDV-SCNC: 1.13 MMOL/L (ref 1.1–1.33)
CALCIUM SERPL-MCNC: 9.1 MG/DL (ref 8.6–10.6)
CARDIAC TROPONIN I PNL SERPL HS: 5 NG/L (ref 0–53)
CARDIAC TROPONIN I PNL SERPL HS: 7 NG/L (ref 0–53)
CHLORIDE BLDV-SCNC: 92 MMOL/L (ref 98–107)
CHLORIDE SERPL-SCNC: 91 MMOL/L (ref 98–107)
CO2 SERPL-SCNC: 41 MMOL/L (ref 21–32)
CREAT SERPL-MCNC: 0.98 MG/DL (ref 0.5–1.3)
EGFRCR SERPLBLD CKD-EPI 2021: >90 ML/MIN/1.73M*2
EOSINOPHIL # BLD AUTO: 0.17 X10*3/UL (ref 0–0.7)
EOSINOPHIL NFR BLD AUTO: 2.4 %
ERYTHROCYTE [DISTWIDTH] IN BLOOD BY AUTOMATED COUNT: 11.8 % (ref 11.5–14.5)
FLUAV RNA RESP QL NAA+PROBE: NOT DETECTED
FLUBV RNA RESP QL NAA+PROBE: NOT DETECTED
GLUCOSE BLD MANUAL STRIP-MCNC: 244 MG/DL (ref 74–99)
GLUCOSE BLDV-MCNC: 312 MG/DL (ref 74–99)
GLUCOSE SERPL-MCNC: 311 MG/DL (ref 74–99)
HCO3 BLDV-SCNC: 48 MMOL/L (ref 22–26)
HCT VFR BLD AUTO: 44.6 % (ref 41–52)
HCT VFR BLD EST: 45 % (ref 41–52)
HGB BLD-MCNC: 14.6 G/DL (ref 13.5–17.5)
HGB BLDV-MCNC: 15.1 G/DL (ref 13.5–17.5)
IMM GRANULOCYTES # BLD AUTO: 0.03 X10*3/UL (ref 0–0.7)
IMM GRANULOCYTES NFR BLD AUTO: 0.4 % (ref 0–0.9)
LACTATE BLDV-SCNC: 1.7 MMOL/L (ref 0.4–2)
LYMPHOCYTES # BLD AUTO: 1.93 X10*3/UL (ref 1.2–4.8)
LYMPHOCYTES NFR BLD AUTO: 27.5 %
MAGNESIUM SERPL-MCNC: 1.71 MG/DL (ref 1.6–2.4)
MCH RBC QN AUTO: 31 PG (ref 26–34)
MCHC RBC AUTO-ENTMCNC: 32.7 G/DL (ref 32–36)
MCV RBC AUTO: 95 FL (ref 80–100)
MONOCYTES # BLD AUTO: 0.53 X10*3/UL (ref 0.1–1)
MONOCYTES NFR BLD AUTO: 7.6 %
NEUTROPHILS # BLD AUTO: 4.33 X10*3/UL (ref 1.2–7.7)
NEUTROPHILS NFR BLD AUTO: 61.8 %
NRBC BLD-RTO: 0.3 /100 WBCS (ref 0–0)
OXYHGB MFR BLDV: 71.4 % (ref 45–75)
P AXIS: -64 DEGREES
P AXIS: 55 DEGREES
P OFFSET: 197 MS
P OFFSET: 200 MS
P ONSET: 142 MS
P ONSET: 149 MS
PCO2 BLDV: 89 MM HG (ref 41–51)
PH BLDV: 7.34 PH (ref 7.33–7.43)
PLATELET # BLD AUTO: 222 X10*3/UL (ref 150–450)
PO2 BLDV: 47 MM HG (ref 35–45)
POTASSIUM BLDV-SCNC: 4.3 MMOL/L (ref 3.5–5.3)
POTASSIUM SERPL-SCNC: 4.1 MMOL/L (ref 3.5–5.3)
PR INTERVAL: 120 MS
PR INTERVAL: 142 MS
PROT SERPL-MCNC: 7.6 G/DL (ref 6.4–8.2)
Q ONSET: 209 MS
Q ONSET: 213 MS
QRS COUNT: 17 BEATS
QRS COUNT: 31 BEATS
QRS DURATION: 80 MS
QRS DURATION: 84 MS
QT INTERVAL: 188 MS
QT INTERVAL: 324 MS
QTC CALCULATION(BAZETT): 332 MS
QTC CALCULATION(BAZETT): 415 MS
QTC FREDERICIA: 275 MS
QTC FREDERICIA: 382 MS
R AXIS: -7 DEGREES
R AXIS: 98 DEGREES
RBC # BLD AUTO: 4.71 X10*6/UL (ref 4.5–5.9)
SAO2 % BLDV: 74 % (ref 45–75)
SARS-COV-2 RNA RESP QL NAA+PROBE: NOT DETECTED
SODIUM BLDV-SCNC: 136 MMOL/L (ref 136–145)
SODIUM SERPL-SCNC: 140 MMOL/L (ref 136–145)
T AXIS: 50 DEGREES
T AXIS: 60 DEGREES
T OFFSET: 303 MS
T OFFSET: 375 MS
VENTRICULAR RATE: 188 BPM
VENTRICULAR RATE: 99 BPM
WBC # BLD AUTO: 7 X10*3/UL (ref 4.4–11.3)

## 2024-12-15 PROCEDURE — 74018 RADEX ABDOMEN 1 VIEW: CPT | Performed by: RADIOLOGY

## 2024-12-15 PROCEDURE — 80053 COMPREHEN METABOLIC PANEL: CPT

## 2024-12-15 PROCEDURE — 82947 ASSAY GLUCOSE BLOOD QUANT: CPT

## 2024-12-15 PROCEDURE — 96374 THER/PROPH/DIAG INJ IV PUSH: CPT

## 2024-12-15 PROCEDURE — 85025 COMPLETE CBC W/AUTO DIFF WBC: CPT

## 2024-12-15 PROCEDURE — 83880 ASSAY OF NATRIURETIC PEPTIDE: CPT

## 2024-12-15 PROCEDURE — 93005 ELECTROCARDIOGRAM TRACING: CPT

## 2024-12-15 PROCEDURE — 99285 EMERGENCY DEPT VISIT HI MDM: CPT | Mod: 25 | Performed by: EMERGENCY MEDICINE

## 2024-12-15 PROCEDURE — 2500000004 HC RX 250 GENERAL PHARMACY W/ HCPCS (ALT 636 FOR OP/ED)

## 2024-12-15 PROCEDURE — 82805 BLOOD GASES W/O2 SATURATION: CPT

## 2024-12-15 PROCEDURE — 36415 COLL VENOUS BLD VENIPUNCTURE: CPT

## 2024-12-15 PROCEDURE — 87636 SARSCOV2 & INF A&B AMP PRB: CPT

## 2024-12-15 PROCEDURE — 99285 EMERGENCY DEPT VISIT HI MDM: CPT | Performed by: EMERGENCY MEDICINE

## 2024-12-15 PROCEDURE — 74018 RADEX ABDOMEN 1 VIEW: CPT

## 2024-12-15 PROCEDURE — 71046 X-RAY EXAM CHEST 2 VIEWS: CPT | Performed by: RADIOLOGY

## 2024-12-15 PROCEDURE — 71046 X-RAY EXAM CHEST 2 VIEWS: CPT

## 2024-12-15 PROCEDURE — 83735 ASSAY OF MAGNESIUM: CPT

## 2024-12-15 PROCEDURE — 82435 ASSAY OF BLOOD CHLORIDE: CPT

## 2024-12-15 PROCEDURE — 84484 ASSAY OF TROPONIN QUANT: CPT

## 2024-12-15 RX ORDER — FUROSEMIDE 10 MG/ML
80 INJECTION INTRAMUSCULAR; INTRAVENOUS ONCE
Status: COMPLETED | OUTPATIENT
Start: 2024-12-15 | End: 2024-12-15

## 2024-12-15 ASSESSMENT — PAIN DESCRIPTION - PROGRESSION: CLINICAL_PROGRESSION: OTHER (COMMENT)

## 2024-12-15 ASSESSMENT — COLUMBIA-SUICIDE SEVERITY RATING SCALE - C-SSRS
2. HAVE YOU ACTUALLY HAD ANY THOUGHTS OF KILLING YOURSELF?: NO
1. IN THE PAST MONTH, HAVE YOU WISHED YOU WERE DEAD OR WISHED YOU COULD GO TO SLEEP AND NOT WAKE UP?: NO
6. HAVE YOU EVER DONE ANYTHING, STARTED TO DO ANYTHING, OR PREPARED TO DO ANYTHING TO END YOUR LIFE?: NO

## 2024-12-15 ASSESSMENT — PAIN - FUNCTIONAL ASSESSMENT: PAIN_FUNCTIONAL_ASSESSMENT: 0-10

## 2024-12-15 ASSESSMENT — PAIN SCALES - GENERAL: PAINLEVEL_OUTOF10: 4

## 2024-12-16 LAB
ALBUMIN SERPL BCP-MCNC: 3.8 G/DL (ref 3.4–5)
ANION GAP SERPL CALC-SCNC: 10 MMOL/L (ref 10–20)
BUN SERPL-MCNC: 12 MG/DL (ref 6–23)
CALCIUM SERPL-MCNC: 8.8 MG/DL (ref 8.6–10.6)
CHLORIDE SERPL-SCNC: 89 MMOL/L (ref 98–107)
CO2 SERPL-SCNC: 44 MMOL/L (ref 21–32)
CREAT SERPL-MCNC: 1.05 MG/DL (ref 0.5–1.3)
EGFRCR SERPLBLD CKD-EPI 2021: 87 ML/MIN/1.73M*2
ERYTHROCYTE [DISTWIDTH] IN BLOOD BY AUTOMATED COUNT: 11.9 % (ref 11.5–14.5)
GLUCOSE BLD MANUAL STRIP-MCNC: 272 MG/DL (ref 74–99)
GLUCOSE BLD MANUAL STRIP-MCNC: 280 MG/DL (ref 74–99)
GLUCOSE BLD MANUAL STRIP-MCNC: 312 MG/DL (ref 74–99)
GLUCOSE BLD MANUAL STRIP-MCNC: 323 MG/DL (ref 74–99)
GLUCOSE SERPL-MCNC: 332 MG/DL (ref 74–99)
HCT VFR BLD AUTO: 43 % (ref 41–52)
HGB BLD-MCNC: 13.8 G/DL (ref 13.5–17.5)
MAGNESIUM SERPL-MCNC: 1.66 MG/DL (ref 1.6–2.4)
MCH RBC QN AUTO: 31.1 PG (ref 26–34)
MCHC RBC AUTO-ENTMCNC: 32.1 G/DL (ref 32–36)
MCV RBC AUTO: 97 FL (ref 80–100)
NRBC BLD-RTO: 0.3 /100 WBCS (ref 0–0)
PHOSPHATE SERPL-MCNC: 4.2 MG/DL (ref 2.5–4.9)
PLATELET # BLD AUTO: 214 X10*3/UL (ref 150–450)
POTASSIUM SERPL-SCNC: 4.3 MMOL/L (ref 3.5–5.3)
RBC # BLD AUTO: 4.44 X10*6/UL (ref 4.5–5.9)
SODIUM SERPL-SCNC: 139 MMOL/L (ref 136–145)
WBC # BLD AUTO: 7.5 X10*3/UL (ref 4.4–11.3)

## 2024-12-16 PROCEDURE — 2500000002 HC RX 250 W HCPCS SELF ADMINISTERED DRUGS (ALT 637 FOR MEDICARE OP, ALT 636 FOR OP/ED): Performed by: NURSE PRACTITIONER

## 2024-12-16 PROCEDURE — 84100 ASSAY OF PHOSPHORUS: CPT | Performed by: NURSE PRACTITIONER

## 2024-12-16 PROCEDURE — 1200000002 HC GENERAL ROOM WITH TELEMETRY DAILY

## 2024-12-16 PROCEDURE — 82947 ASSAY GLUCOSE BLOOD QUANT: CPT

## 2024-12-16 PROCEDURE — 2500000004 HC RX 250 GENERAL PHARMACY W/ HCPCS (ALT 636 FOR OP/ED): Performed by: NURSE PRACTITIONER

## 2024-12-16 PROCEDURE — 99222 1ST HOSP IP/OBS MODERATE 55: CPT | Performed by: STUDENT IN AN ORGANIZED HEALTH CARE EDUCATION/TRAINING PROGRAM

## 2024-12-16 PROCEDURE — 2500000004 HC RX 250 GENERAL PHARMACY W/ HCPCS (ALT 636 FOR OP/ED): Performed by: INTERNAL MEDICINE

## 2024-12-16 PROCEDURE — 85027 COMPLETE CBC AUTOMATED: CPT | Performed by: NURSE PRACTITIONER

## 2024-12-16 PROCEDURE — 2500000001 HC RX 250 WO HCPCS SELF ADMINISTERED DRUGS (ALT 637 FOR MEDICARE OP): Performed by: NURSE PRACTITIONER

## 2024-12-16 PROCEDURE — 2500000005 HC RX 250 GENERAL PHARMACY W/O HCPCS: Performed by: NURSE PRACTITIONER

## 2024-12-16 PROCEDURE — 2500000001 HC RX 250 WO HCPCS SELF ADMINISTERED DRUGS (ALT 637 FOR MEDICARE OP): Performed by: INTERNAL MEDICINE

## 2024-12-16 PROCEDURE — 99223 1ST HOSP IP/OBS HIGH 75: CPT | Performed by: NURSE PRACTITIONER

## 2024-12-16 PROCEDURE — 36415 COLL VENOUS BLD VENIPUNCTURE: CPT | Performed by: NURSE PRACTITIONER

## 2024-12-16 PROCEDURE — 83735 ASSAY OF MAGNESIUM: CPT | Performed by: NURSE PRACTITIONER

## 2024-12-16 RX ORDER — DEXTROSE 50 % IN WATER (D50W) INTRAVENOUS SYRINGE
12.5
Status: DISCONTINUED | OUTPATIENT
Start: 2024-12-16 | End: 2024-12-20 | Stop reason: HOSPADM

## 2024-12-16 RX ORDER — ASPIRIN 81 MG/1
81 TABLET ORAL DAILY
Status: DISCONTINUED | OUTPATIENT
Start: 2024-12-16 | End: 2024-12-20 | Stop reason: HOSPADM

## 2024-12-16 RX ORDER — GABAPENTIN 100 MG/1
100 CAPSULE ORAL 3 TIMES DAILY
Status: DISCONTINUED | OUTPATIENT
Start: 2024-12-16 | End: 2024-12-17

## 2024-12-16 RX ORDER — ALBUTEROL SULFATE 90 UG/1
2 INHALANT RESPIRATORY (INHALATION) EVERY 4 HOURS PRN
Status: DISCONTINUED | OUTPATIENT
Start: 2024-12-16 | End: 2024-12-20 | Stop reason: HOSPADM

## 2024-12-16 RX ORDER — ACETAMINOPHEN 325 MG/1
650 TABLET ORAL EVERY 6 HOURS PRN
Status: DISCONTINUED | OUTPATIENT
Start: 2024-12-16 | End: 2024-12-20 | Stop reason: HOSPADM

## 2024-12-16 RX ORDER — LOSARTAN POTASSIUM 50 MG/1
50 TABLET ORAL DAILY
Status: DISCONTINUED | OUTPATIENT
Start: 2024-12-16 | End: 2024-12-20 | Stop reason: HOSPADM

## 2024-12-16 RX ORDER — MAGNESIUM SULFATE HEPTAHYDRATE 40 MG/ML
2 INJECTION, SOLUTION INTRAVENOUS ONCE
Status: COMPLETED | OUTPATIENT
Start: 2024-12-16 | End: 2024-12-16

## 2024-12-16 RX ORDER — FUROSEMIDE 10 MG/ML
80 INJECTION INTRAMUSCULAR; INTRAVENOUS
Status: COMPLETED | OUTPATIENT
Start: 2024-12-16 | End: 2024-12-16

## 2024-12-16 RX ORDER — AMLODIPINE BESYLATE 10 MG/1
10 TABLET ORAL DAILY
Status: DISCONTINUED | OUTPATIENT
Start: 2024-12-16 | End: 2024-12-20 | Stop reason: HOSPADM

## 2024-12-16 RX ORDER — ENOXAPARIN SODIUM 100 MG/ML
60 INJECTION SUBCUTANEOUS EVERY 12 HOURS SCHEDULED
Status: DISCONTINUED | OUTPATIENT
Start: 2024-12-16 | End: 2024-12-20 | Stop reason: HOSPADM

## 2024-12-16 RX ORDER — DICLOFENAC SODIUM 10 MG/G
1 GEL TOPICAL 4 TIMES DAILY PRN
COMMUNITY
Start: 2024-12-05

## 2024-12-16 RX ORDER — ACETAMINOPHEN 500 MG
10 TABLET ORAL NIGHTLY PRN
Status: DISCONTINUED | OUTPATIENT
Start: 2024-12-16 | End: 2024-12-20 | Stop reason: HOSPADM

## 2024-12-16 RX ORDER — DEXTROSE 50 % IN WATER (D50W) INTRAVENOUS SYRINGE
25
Status: DISCONTINUED | OUTPATIENT
Start: 2024-12-16 | End: 2024-12-20 | Stop reason: HOSPADM

## 2024-12-16 RX ORDER — AMOXICILLIN 250 MG
2 CAPSULE ORAL 2 TIMES DAILY
Status: DISCONTINUED | OUTPATIENT
Start: 2024-12-16 | End: 2024-12-20 | Stop reason: HOSPADM

## 2024-12-16 RX ORDER — INSULIN LISPRO 100 [IU]/ML
6 INJECTION, SOLUTION INTRAVENOUS; SUBCUTANEOUS ONCE
Status: COMPLETED | OUTPATIENT
Start: 2024-12-17 | End: 2024-12-16

## 2024-12-16 RX ORDER — IPRATROPIUM BROMIDE AND ALBUTEROL SULFATE 2.5; .5 MG/3ML; MG/3ML
3 SOLUTION RESPIRATORY (INHALATION) EVERY 4 HOURS PRN
Status: DISCONTINUED | OUTPATIENT
Start: 2024-12-16 | End: 2024-12-20 | Stop reason: HOSPADM

## 2024-12-16 RX ORDER — AMLODIPINE BESYLATE 10 MG/1
10 TABLET ORAL DAILY
COMMUNITY
Start: 2024-10-31 | End: 2024-12-20 | Stop reason: HOSPADM

## 2024-12-16 RX ORDER — FUROSEMIDE 10 MG/ML
40 INJECTION INTRAMUSCULAR; INTRAVENOUS
Status: DISCONTINUED | OUTPATIENT
Start: 2024-12-16 | End: 2024-12-16

## 2024-12-16 RX ORDER — INSULIN LISPRO 100 [IU]/ML
0-10 INJECTION, SOLUTION INTRAVENOUS; SUBCUTANEOUS
Status: DISCONTINUED | OUTPATIENT
Start: 2024-12-16 | End: 2024-12-20 | Stop reason: HOSPADM

## 2024-12-16 RX ORDER — ATORVASTATIN CALCIUM 20 MG/1
20 TABLET, FILM COATED ORAL NIGHTLY
Status: DISCONTINUED | OUTPATIENT
Start: 2024-12-16 | End: 2024-12-20 | Stop reason: HOSPADM

## 2024-12-16 ASSESSMENT — ENCOUNTER SYMPTOMS
ABDOMINAL PAIN: 0
CHILLS: 0
CONFUSION: 0
VOMITING: 0
SHORTNESS OF BREATH: 1
HEADACHES: 0
DIFFICULTY URINATING: 0
MUSCULOSKELETAL NEGATIVE: 1
DIZZINESS: 0
FEVER: 0
NAUSEA: 0
WEAKNESS: 1

## 2024-12-16 ASSESSMENT — PAIN - FUNCTIONAL ASSESSMENT: PAIN_FUNCTIONAL_ASSESSMENT: 0-10

## 2024-12-16 ASSESSMENT — PAIN SCALES - GENERAL
PAINLEVEL_OUTOF10: 10 - WORST POSSIBLE PAIN
PAINLEVEL_OUTOF10: 0 - NO PAIN
PAINLEVEL_OUTOF10: 6
PAINLEVEL_OUTOF10: 4

## 2024-12-16 ASSESSMENT — PAIN DESCRIPTION - LOCATION: LOCATION: OTHER (COMMENT)

## 2024-12-16 NOTE — ED TRIAGE NOTES
Patient to ED for c/o lethargy/dizziness. Per squad, patient was satting in the 70s on his home O2 of 4L, when put on 6L by EMS, patient satting in mid 90s. Patient has history of DM, COPD and CHF. Patient also complaining of worsening swelling to BLE. On arrival, patient satting low 90s.  GCG=[id number]

## 2024-12-16 NOTE — PROGRESS NOTES
Pharmacy Medication History Review    Eugene Hutson is a 49 y.o. male admitted for Acute on chronic diastolic CHF (congestive heart failure). Pharmacy reviewed the patient's [id number] medications and allergies for accuracy.    Medications ADDED:  None  Medications CHANGED:  Sennosides/Docusate  Medications REMOVED/NOT TAKING:   Aspirin     The list below reflects the updated PTA list.   Prior to Admission Medications   Prescriptions Last Dose Informant   Trulicity 1.5 mg/0.5 mL pen injector injection 12/9/2024 Self   Sig: Inject 1.5 mg under the skin 1 (one) time per week. On Monday   albuterol 90 mcg/actuation inhaler 12/15/2024 Self   Sig: Inhale 2 puffs every 4 hours if needed for wheezing or shortness of breath.   amLODIPine (Norvasc) 10 mg tablet 12/15/2024 Self   Sig: Take 1 tablet (10 mg) by mouth once daily.   atorvastatin (Lipitor) 20 mg tablet 12/15/2024 Self   Sig: Take 1 tablet (20 mg) by mouth once daily.   diclofenac sodium (Voltaren) 1 % gel 12/15/2024 Self   Sig: Apply 4.5 inches (1 Application) topically 4 times a day as needed (pain).   furosemide (Lasix) 40 mg tablet 12/15/2024 Self   Sig: Take 1 tablet (40 mg) by mouth 2 times daily (morning and late afternoon).   gabapentin (Neurontin) 100 mg capsule Unknown    Sig: Take 1 capsule (100 mg) by mouth 3 times a day.   losartan (Cozaar) 50 mg tablet 12/15/2024 Self   Sig: Take 1 tablet (50 mg) by mouth once daily.   metFORMIN (Glucophage) 1,000 mg tablet 12/15/2024 Self   Sig: Take 1 tablet (1,000 mg) by mouth 2 times daily (morning and late afternoon).   oxygen (O2) gas therapy     Sig: Inhale 1 each every 12 hours.   sennosides-docusate sodium (Rosalba-Colace) 8.6-50 mg tablet Unknown Self   Sig: Take 2 tablets by mouth 2 times a day.   Patient taking differently: Take 2 tablets by mouth once daily.      Facility-Administered Medications: None        The list below reflects the updated allergy list. Please review each documented allergy for  "additional clarification and justification.  Allergies  Reviewed by Indu May, RN on 12/15/2024   No Known Allergies         Patient declines M2B at discharge.     Sources:   Patient interview (moderate historian, unsure of some strengths)  OARRS  Care Everywhere  Medication fill history  Discharge Summary 10/30/24    Additional Comments:  None to note      Jennifer Majano [facility]  Transitions of Care Pharmacist  12/16/24     Secure Chat preferred   If no response call [phone] or Vocera \"Med Rec\"    "

## 2024-12-16 NOTE — H&P
History Of Present Illness  Eugene Hutson is a 49 y.o. male with a PMH of Morbid obesity, HFpEF (EF 63% 10/29/24), HTN, HLD, T2DM (last HgA1c 8.7% 9/26/24) COPD (wears 4LNC at baseline), and YANI. Mr. Hutson presented to ED for further evaluation of SOB and worsening BLE edema that started 3-4 days ago. Pt states that he normally wears 4LNC at baseline but he had to increase his oxygen to 6LNC due to dyspnea. Dyspnea is present with exertion and pt also endorses orthopnea. Pt states that he needs to sleep on at least 3 pillows at night and sometimes he needs more pillows. Last hospitalization per chart review was 10/27/24-10/30/24 due to acute on chronic diastolic heart failure after IV diuresis on Lasix 40 mg PO BID per discharge summary. Pt states that he was told to take 80 mg PO BID of Lasix on discharge but he has not been doing this due to running out of medication.  Labs obtained on admit reviewed and are notable for hyperglycemia, hypochloremia, and chronic hypercarbia with appropriate compensation. KUB showed moderate colonic stool burden with nonobstructive bowel gas pattern. CXR showed no acute cardiopulmonary process. Per chart review pt was last seen by cardiology during hospitalization in November 2023. Hospitalization is required at this time for further treatment and management of acute on chronic diastolic heart failure.     Past Medical History  Past Medical History:   Diagnosis Date    Acute cor pulmonale (Multi) 07/11/2023    CHF (congestive heart failure)     COPD (chronic obstructive pulmonary disease) (Multi)     Diabetes mellitus (Multi)        Surgical History  History reviewed. No pertinent surgical history.     Social History  He reports that he has never smoked. He has never been exposed to tobacco smoke. He has never used smokeless tobacco. He reports that he does not currently use alcohol. He reports that he does not use drugs.    Family History  Family History   Problem Relation Name  "Age of Onset    Kidney failure Father          Allergies  Patient has no known allergies.    Review of Systems   Constitutional:  Negative for chills and fever.   HENT:  Negative for congestion.    Respiratory:  Positive for shortness of breath.         +orthopnea, needs at least 3 pillows to sleep at night   Cardiovascular:  Positive for leg swelling. Negative for chest pain.   Gastrointestinal:  Negative for abdominal pain, nausea and vomiting.   Genitourinary:  Negative for difficulty urinating.   Musculoskeletal: Negative.    Neurological:  Positive for weakness. Negative for dizziness and headaches.   Psychiatric/Behavioral:  Negative for confusion.         Physical Exam  Vitals reviewed.   Constitutional:       General: He is not in acute distress.     Appearance: He is obese.   HENT:      Nose: Nose normal.      Mouth/Throat:      Mouth: Mucous membranes are dry.   Cardiovascular:      Rate and Rhythm: Normal rate.      Pulses: Normal pulses.      Heart sounds: Normal heart sounds.   Pulmonary:      Effort: Pulmonary effort is normal.      Comments: Crackles present in bilateral bases  Abdominal:      General: Bowel sounds are normal.      Palpations: Abdomen is soft.   Musculoskeletal:      Right lower leg: Edema present.      Left lower leg: Edema present.   Skin:     General: Skin is warm and dry.   Neurological:      Mental Status: He is oriented to person, place, and time.          Last Recorded Vitals  Blood pressure (!) 121/91, pulse 93, temperature 36.5 °C (97.7 °F), temperature source Temporal, resp. rate 10, height 1.93 m (6' 4\"), weight (!) 220 kg (485 lb 0.2 oz), SpO2 94%.    Relevant Results    ECG 12 lead    Result Date: 12/15/2024  Normal sinus rhythm Normal ECG When compared with ECG of 15-DEC-2024 22:05, Previous ECG has undetermined rhythm, needs review Questionable change in QRS axis ST no longer elevated in Inferior leads ST no longer depressed in Anterolateral leads T wave inversion no " longer evident in Lateral leads See ED provider note for full interpretation and clinical correlation Confirmed by Remigio Elam ([id number]) on 12/15/2024 11:13:55 PM    XR abdomen 1 view    Result Date: 12/15/2024  Interpreted By:  Demarcus Box and Sheng Max STUDY: XR ABDOMEN 1 VIEW;  12/15/2024 10:39 pm   INDICATION: Signs/Symptoms:KUB for stool burden.     COMPARISON: Chest radiographs 12/15/2024, 10/27/2024   ACCESSION NUMBER(S): [id number]   ORDERING CLINICIAN: EDGAR QUICK   FINDINGS: Moderate colonic stool burden with nonobstructive bowel gas pattern. Limited evaluation of pneumoperitoneum on supine imaging, however no gross evidence of free air is noted.   Visualized lungs are clear.   Osseous structures demonstrate no acute bony changes.       1. Moderate colonic stool burden with nonobstructive bowel gas pattern.     I personally reviewed the images/study and I agree with the findings as stated by Dr. Franko Geronimo. This study was interpreted at [place], [place].   MACRO: None   Signed by: Demarcus Box 12/15/2024 10:52 PM Dictation workstation:   [id number]    XR chest 2 views    Result Date: 12/15/2024  Interpreted By:  Demarcus Box, STUDY: XR CHEST 2 VIEWS;  12/15/2024 10:39 pm   INDICATION: Signs/Symptoms:Chest Pain.   COMPARISON: None.   ACCESSION NUMBER(S): [id number]   ORDERING CLINICIAN: BRENDA CHRISTIAN   FINDINGS: Cardiomediastinal silhouette and pulmonary vasculature are within normal limits. No consolidation, effusion or pneumothorax.       No acute cardiopulmonary process.   MACRO: None   Signed by: Demarcus Box 12/15/2024 10:40 PM Dictation workstation:   [id number]        Results for orders placed or performed during the hospital encounter of 12/15/24 (from the past 24 hours)   POCT GLUCOSE   Result Value Ref Range    POCT Glucose 244 (H) 74 - 99 mg/dL   Blood Gas Venous Full Panel Unsolicited   Result Value Ref Range    POCT pH, Venous  7.34 7.33 - 7.43 pH    POCT pCO2, Venous 89 (HH) 41 - 51 mm Hg    POCT pO2, Venous 47 (H) 35 - 45 mm Hg    POCT SO2, Venous 74 45 - 75 %    POCT Oxy Hemoglobin, Venous 71.4 45.0 - 75.0 %    POCT Hematocrit Calculated, Venous 45.0 41.0 - 52.0 %    POCT Sodium, Venous 136 136 - 145 mmol/L    POCT Potassium, Venous 4.3 3.5 - 5.3 mmol/L    POCT Chloride, Venous 92 (L) 98 - 107 mmol/L    POCT Ionized Calicum, Venous 1.13 1.10 - 1.33 mmol/L    POCT Glucose, Venous 312 (H) 74 - 99 mg/dL    POCT Lactate, Venous 1.7 0.4 - 2.0 mmol/L    POCT Base Excess, Venous 16.9 (H) -2.0 - 3.0 mmol/L    POCT HCO3 Calculated, Venous 48.0 (H) 22.0 - 26.0 mmol/L    POCT Hemoglobin, Venous 15.1 13.5 - 17.5 g/dL    POCT Anion Gap, Venous 0.0 (L) 10.0 - 25.0 mmol/L    Patient Temperature 37.0 degrees Celsius   CBC and Auto Differential   Result Value Ref Range    WBC 7.0 4.4 - 11.3 x10*3/uL    nRBC 0.3 (H) 0.0 - 0.0 /100 WBCs    RBC 4.71 4.50 - 5.90 x10*6/uL    Hemoglobin 14.6 13.5 - 17.5 g/dL    Hematocrit 44.6 41.0 - 52.0 %    MCV 95 80 - 100 fL    MCH 31.0 26.0 - 34.0 pg    MCHC 32.7 32.0 - 36.0 g/dL    RDW 11.8 11.5 - 14.5 %    Platelets 222 150 - 450 x10*3/uL    Neutrophils % 61.8 40.0 - 80.0 %    Immature Granulocytes %, Automated 0.4 0.0 - 0.9 %    Lymphocytes % 27.5 13.0 - 44.0 %    Monocytes % 7.6 2.0 - 10.0 %    Eosinophils % 2.4 0.0 - 6.0 %    Basophils % 0.3 0.0 - 2.0 %    Neutrophils Absolute 4.33 1.20 - 7.70 x10*3/uL    Immature Granulocytes Absolute, Automated 0.03 0.00 - 0.70 x10*3/uL    Lymphocytes Absolute 1.93 1.20 - 4.80 x10*3/uL    Monocytes Absolute 0.53 0.10 - 1.00 x10*3/uL    Eosinophils Absolute 0.17 0.00 - 0.70 x10*3/uL    Basophils Absolute 0.02 0.00 - 0.10 x10*3/uL   Comprehensive Metabolic Panel   Result Value Ref Range    Glucose 311 (H) 74 - 99 mg/dL    Sodium 140 136 - 145 mmol/L    Potassium 4.1 3.5 - 5.3 mmol/L    Chloride 91 (L) 98 - 107 mmol/L    Bicarbonate 41 (HH) 21 - 32 mmol/L    Anion Gap 12 10 - 20  mmol/L    Urea Nitrogen 12 6 - 23 mg/dL    Creatinine 0.98 0.50 - 1.30 mg/dL    eGFR >90 >60 mL/min/1.73m*2    Calcium 9.1 8.6 - 10.6 mg/dL    Albumin 4.1 3.4 - 5.0 g/dL    Alkaline Phosphatase 83 33 - 120 U/L    Total Protein 7.6 6.4 - 8.2 g/dL    AST 11 9 - 39 U/L    Bilirubin, Total 0.7 0.0 - 1.2 mg/dL    ALT 8 (L) 10 - 52 U/L   Magnesium   Result Value Ref Range    Magnesium 1.71 1.60 - 2.40 mg/dL   Troponin I, High Sensitivity, Initial   Result Value Ref Range    Troponin I, High Sensitivity (CMC) 5 0 - 53 ng/L   B-type natriuretic peptide   Result Value Ref Range    BNP 21 0 - 99 pg/mL   Sars-CoV-2 and Influenza A/B PCR   Result Value Ref Range    Flu A Result Not Detected Not Detected    Flu B Result Not Detected Not Detected    Coronavirus 2019, PCR Not Detected Not Detected   ECG 12 lead   Result Value Ref Range    Ventricular Rate 99 BPM    Atrial Rate 99 BPM    AZ Interval 142 ms    QRS Duration 84 ms    QT Interval 324 ms    QTC Calculation(Bazett) 415 ms    P Axis 55 degrees    R Axis -7 degrees    T Axis 50 degrees    QRS Count 17 beats    Q Onset 213 ms    P Onset 142 ms    P Offset 200 ms    T Offset 375 ms    QTC Fredericia 382 ms   ECG 12 lead   Result Value Ref Range    Ventricular Rate 188 BPM    Atrial Rate 174 BPM    AZ Interval 120 ms    QRS Duration 80 ms    QT Interval 188 ms    QTC Calculation(Bazett) 332 ms    P Axis -64 degrees    R Axis 98 degrees    T Axis 60 degrees    QRS Count 31 beats    Q Onset 209 ms    P Onset 149 ms    P Offset 197 ms    T Offset 303 ms    QTC Fredericia 275 ms   Troponin, High Sensitivity, 1 Hour   Result Value Ref Range    Troponin I, High Sensitivity (CMC) 7 0 - 53 ng/L      Lab Results   Component Value Date    HGBA1C 8.7 (H) 09/26/2024      ED Medication Administration from 12/15/2024 2151 to 12/16/2024 0108         Date/Time Order Dose Route Action Action by     12/15/2024 2241 EST furosemide (Lasix) injection 80 mg 80 mg intravenous Given BRIGIDO May      12/16/2024 0039 EST oxygen (O2) therapy 4 L/min inhalation Start May, E           Scheduled medications  amLODIPine, 10 mg, oral, Daily  aspirin, 81 mg, oral, Daily  atorvastatin, 20 mg, oral, Nightly  enoxaparin, 60 mg, subcutaneous, q12h NADER  furosemide, 80 mg, intravenous, BID  gabapentin, 100 mg, oral, TID  insulin lispro, 0-10 Units, subcutaneous, TID AC  losartan, 50 mg, oral, Daily  oxygen, , inhalation, q12h  sennosides-docusate sodium, 2 tablet, oral, BID      Continuous medications     PRN medications  PRN medications: albuterol, dextrose, dextrose, glucagon, glucagon, ipratropium-albuteroL, melatonin       Assessment/Plan     Morbid obesity, HFpEF (EF 63% 10/29/24), HTN, HLD, T2DM (last HgA1c 8.7% 9/26/24) COPD (wears 4LNC at baseline), and YANI. Mr. Hutson presented to ED for further evaluation of SOB and worsening BLE edema that started 3-4 days ago. Pt states that he normally wears 4LNC at baseline but he had to increase his oxygen to 6LNC due to dyspnea. Last hospitalization per chart review was 10/27/24-10/30/24 due to acute on chronic diastolic heart failure after IV diuresis on Lasix 40 mg PO BID. Hospitalization is required at this time for further treatment and management of acute on chronic diastolic heart failure.      #acute on chronic diastolic heart failure  - BNP 21 on admit but this may not be accurate d/t pt BMI of 59  - Echocardiogram (10/29/24): Left ventricular ejection fraction is normal, calculated by Beckford's biplane at 63%.Left ventricular diastolic filling was indeterminate.  Unable to determine right ventricular systolic function.Compared with the prior exam from 11/28/2023 both studies were technically very difficult but both showed normal LV systolic function without regional wall motion abnormalities. The remaining information gained was quite limited.  - Chest X-ray: No acute cardiopulmonary process.   - Respiratory Regimen: Duonebs Q4hr PRN, Incentive spirometry,  Supplemental oxygen PRN   - Ordered Lasix 80mg IV BID (x 2 doses); s/p Lasix 80 mg IV push x1 dose while in the ED  - Monitor renal function and electrolytes closely while on aggressive IV diuretic therapy   - Daily Weights (standing), Strict I/Os (record results under I&O each shift)  - Goal Net Negative I/O of -1 to -2 Liters / 24hr   - Consider cardiology/heart failure consult if no improvement after initial therapy   - Heart failure navigator consulted  - pt will need follow up appt scheduled on discharge, last seen by cardiology while hospitalized Nov of 2023  - RFP and Mg2+ level ordered for AM lab draw  - 2g Na diet/ 2L fluid restriction    #COPD (wears 4LNC at baseline)  #YANI  - c/w Albuterol MDI PRN and Duoneb q4h PRN   - no recent PFTs found in medical record  - maintain Pox>92%, wean as tolerated back to baseline O2  - CPAP ordered (auto-titrating)    #T2DM (last HgA1c 8.7% 9/26/24) c/b neuropathy  - accucheck ACHS  - home regimen: trulicity 1.5 mg and Metformin 1000 mg PO BID  - holding home dose of Metformin during admit will resume on discharge  - Lispro SSI (0-10 units) ordered   - accucheck ACHS  - c/w Gabapentin 100 mg PO TID    #HTN  - last /91  - continue to monitor BP  - c/w home dose of Norvasc 10 mg PO every day and Losartan 50 mg PO every day    #HLD  - c/w Lipitor 20 mg PO QHS    Dispo: admit to RNF. Plan per above. Estimated LOS: 2 days    I spent 75 minutes in the professional and overall care of this patient.      Joselyn Suh, APRN-CNP

## 2024-12-16 NOTE — PROGRESS NOTES
I received this patient during signout at 0700.   Please see previous provider's note for detailed H&P, labs and imaging.      Under my care, patient reassessed and remains clinically stable. See ED course below.    @  ED Course as of 12/16/24 1124   Sun Dec 15, 2024   2222 POCT Glucose(!): 244 [AS]   2225 ATTENDING ATTESTATION  49-year-old male with multiple medical comorbidities presenting to the emergency department the complaint of shortness of breath.  The patient has a history of CHF, he was recently hospitalized at the end of October had an echocardiogram during that stay heart failure with preserved ejection fraction, was on 40 mg of Lasix twice daily and was supposed to double it to 80 mg twice daily however, the patient states that he did not do this.  He says he is compliant with his Lasix at home but ran out at some unspecified time he has otherwise been taking his antihypertensive medications.  Patient has classic symptoms consistent with a CHF exacerbation including multiple pillow orthopnea, PND, lower extremity edema tense symmetric.  He has a venous gas here that demonstrates chronic hypercarbia with appropriate compensation no acidemia elevated bicarb.  Patient is on oxygen uptitrated for some worsened respiratory symptoms here.  Patient has tense lower extremity edema symmetric no signs of cellulitis or DVT.  EKG was nonischemic.  Patient will need aggressive diuresis, anticipate need for admission  [facility]   [RH]   2342 XR chest 2 views [AS]   Mon Dec 16, 2024   1123 Assumed care of this patient at 0700, patient admitted and boarding in the ED during my shift.  Patient had a fall at approximately 11:15 AM witnessed by a staff member.  I reevaluated patient and he states he did not hit his head, he states he fell onto his back, likely mechanical in nature.  I notified inpatient team hospitalist group FOFANA and Dr. Lynn. Nursing team to do post fall huddle [SA]      ED Course User Index  [AS] Jm  MD Dalton  [] Peter Walker DO  [SA] Lia Jones DO         Diagnoses as of 12/16/24 1124   Acute on chronic diastolic CHF (congestive heart failure)   @    Disposition: Admitted      Patient seen and staffed with attending physician.     Lia Jones DO   EM PGY3

## 2024-12-16 NOTE — ED PROVIDER NOTES
Emergency Department Provider Note          History of Present Illness     CC: Dizziness; Shortness of Breath; Weakness, Gen; and Leg Swelling     History provided by: Patient  Limitations to History: None    HPI:   Eugene Hutson is a 49 y.o.male with PMH DM, COPD, CHF, presenting to the Emergency Department for shortness of breath and leg swelling.  Patient reports the past 3 to 4 days now has had worsening bilateral lower extremity edema as well as shortness of breath.  Usually wears 4 L of oxygen at baseline but now is requiring around 6 L.  Was seen at the end of October and told to double his Lasix to 80mg BID but he has not been doing this.  Has been taking 40 mg in the morning and evening.  Denies chest pain, abdominal pain, or changes in urination during this time.  Last stool was a week ago, has been passing gas.    Records Reviewed: Recent available ED and inpatient notes reviewed in EMR.    PMHx/PSHx:  Per HPI.   - has a past medical history of Acute cor pulmonale (Multi), CHF (congestive heart failure), COPD (chronic obstructive pulmonary disease) (Multi), and Diabetes mellitus (Multi).  - has no past surgical history on file.  - has Congestive heart failure; COPD (chronic obstructive pulmonary disease) (Multi); HLD (hyperlipidemia); HTN (hypertension); Morbid obesity (Multi); YANI (obstructive sleep apnea); Type 2 diabetes mellitus without complications (Multi); Unspecified asthma, uncomplicated ([facility]); Acute on chronic diastolic CHF (congestive heart failure); Respiratory failure with hypercapnia (Multi); Peyronie's disease; and Back pain on their problem list.    Medications:  Current Outpatient Medications   Medication Instructions    albuterol 90 mcg/actuation inhaler 2 puffs, inhalation, Every 4 hours PRN    amLODIPine (NORVASC) 10 mg, Daily    aspirin 81 mg, oral, Daily RT    atorvastatin (LIPITOR) 20 mg, oral, Daily    diclofenac sodium (Voltaren) 1 % gel 1 Application, 4 times daily PRN     furosemide (LASIX) 40 mg, oral, 2 times daily (morning and late afternoon)    gabapentin (NEURONTIN) 100 mg, oral, 3 times daily    losartan (COZAAR) 50 mg, oral, Daily    metFORMIN (GLUCOPHAGE) 1,000 mg, oral, 2 times daily (morning and late afternoon)    oxygen (O2) gas therapy 1 each, inhalation, Every 12 hours    sennosides-docusate sodium (Rosalba-Colace) 8.6-50 mg tablet 2 tablets, oral, 2 times daily    Trulicity 1.5 mg, subcutaneous, Weekly, On Monday        Allergies:  Patient has no known allergies.    Social History:  - Tobacco:  reports that he has never smoked. He has never been exposed to tobacco smoke. He has never used smokeless tobacco.   - Alcohol:  reports that he does not currently use alcohol.   - Illicit Drugs:  reports no history of drug use.     ROS:  Per HPI.       Physical Exam     Triage Vitals:  T 36.5 °C (97.7 °F)  HR 98  BP (!) 159/102  RR 20  O2 (!) 91 % Supplemental oxygen    General: Awake, alert, in no acute distress  Eyes: Gaze conjugate.  No scleral icterus or injection  HENT: Normo-cephalic, atraumatic. No stridor  CV: Regular rate, regular rhythm. Radial pulses 2+ bilaterally  Resp: Breathing non-labored, speaking in full sentences.  Crackles to the bilateral lung bases.  GI: Distended abdomen, no abdominal tenderness to palpation.  MSK/Extremities: No gross bony deformities. Moving all extremities  Skin: Warm. Appropriate color  Neuro: Alert. Oriented. Face symmetric. Speech is fluent.  Gross strength and sensation intact in b/l UE and LEs  Psych: Appropriate mood and affect          Ronda Coma Scale Score: 15                    Medical Decision Making & ED Course     EKG: EKG interpreted by myself. Please see ED Course for full interpretation.    Medical Decision Making   Eugene Hutson is a 49 y.o.male presenting to the Emergency Department for CHF exacerbation.  On arrival, blood pressure 159/101, satting 93% on 6 L nasal cannula.  On examination, patient appears  chronically ill.  Does have significant fluid overload in the bilateral lower extremities and abdomen.  No abdominal tenderness to palpation.  Will get basic labs, chest x-ray, EKG and troponin/BNP for further cardiac evaluation.  Given 80mg Lasix for diuresis.    Update: Labs notable for white count of 7.0, lower concern for infectious process today.  Hemoglobin stable at 14.6.  Chemistries notable for a bicarb of 41, and CO2 elevated to 89 with normal pH.  Believe patient lives in a chronic respiratory acidosis state with metabolic compensation likely secondary to size and severe COPD/CHF.  Lactate notably normal at 1.7 lower concern for malperfusion.  Troponin also notably normal with a BNP of 21.  EKG showed normal sinus rhythm.  Lower concern for acute coronary syndrome today.  Given patient's worsening bilateral lower extremity edema I think he is likely having a CHF exacerbation today and would benefit from ongoing diuresis inpatient.  In the emergency department remains with a new 2L additional ox requirement for a total of 6 L. Will admit today for further evaluation/management.      ED Course as of 12/16/24 0122   Sun Dec 15, 2024   2222 POCT Glucose(!): 244 [AS]   2225 ATTENDING ATTESTATION  49-year-old male with multiple medical comorbidities presenting to the emergency department the complaint of shortness of breath.  The patient has a history of CHF, he was recently hospitalized at the end of October had an echocardiogram during that stay heart failure with preserved ejection fraction, was on 40 mg of Lasix twice daily and was supposed to double it to 80 mg twice daily however, the patient states that he did not do this.  He says he is compliant with his Lasix at home but ran out at some unspecified time he has otherwise been taking his antihypertensive medications.  Patient has classic symptoms consistent with a CHF exacerbation including multiple pillow orthopnea, PND, lower extremity edema tense  symmetric.  He has a venous gas here that demonstrates chronic hypercarbia with appropriate compensation no acidemia elevated bicarb.  Patient is on oxygen uptitrated for some worsened respiratory symptoms here.  Patient has tense lower extremity edema symmetric no signs of cellulitis or DVT.  EKG was nonischemic.  Patient will need aggressive diuresis, anticipate need for admission  [facility]   [RH]   2342 XR chest 2 views [AS]      ED Course User Index  [AS] Jm Hoffman MD  [RH] Peter Walker, DO         Diagnoses as of 12/16/24 0122   Acute on chronic diastolic CHF (congestive heart failure)       Independent Result Review and Interpretation: Relevant laboratory and radiographic results were reviewed and independently interpreted by myself.  As necessary, they are commented on in the ED Course.    Chronic conditions affecting the patient's care: As documented above in MDM.      Disposition   Admit    Jm Hoffman MD  Emergency Medicine PGY3      Procedures     Procedures ? SmartLinks last updated 12/16/2024 1:22 AM        Jm Hoffman MD  Resident  12/16/24 0122

## 2024-12-16 NOTE — CONSULTS
Advanced Heart Failure Initial Consultation Note   Consulting Team: Hospitalist Medicine  Primary Cardiologist: JENNIFER  Reason for Consult: CHF management    Subjective   Eugene Hutson is a 49 y.o. male with a PMHx of HFpEF (EF 63% 10/29/24), HTN, HLD, T2DM (last HgA1c 8.7% 9/26/24) COPD (wears 4LNC at baseline), YANI on CPAP, obesity who was admitted on 12/15/2024 with concern of CHF exacerbation after missing several weeks of diuretics at home. Heart failure was consulted regarding CHF management.     Patient reports he ran out of his lasix for the past 1-2 weeks and noted some worsening LE edema. He had some worsening SOB and shakes 3 days prior and with the encouragement from his mother, came to the ED for further evaluation. He reports hx of orthopnea - though this is chronic for him and he has not tried to sleep flat because of prior experiences with it.  Denies chest pains, lightheadedness/dizziness.     Review of Systems  Otherwise, limited cardiovascular review of systems is negative.    Past Medical History:   Diagnosis Date    Acute cor pulmonale (Multi) 07/11/2023    CHF (congestive heart failure)     COPD (chronic obstructive pulmonary disease) (Multi)     Diabetes mellitus (Multi)      History reviewed. No pertinent surgical history.  Social History     Socioeconomic History    Marital status: Single     Spouse name: Not on file    Number of children: Not on file    Years of education: Not on file    Highest education level: Not on file   Occupational History    Not on file   Tobacco Use    Smoking status: Never     Passive exposure: Never    Smokeless tobacco: Never   Vaping Use    Vaping status: Never Used    Passive vaping exposure: Yes   Substance and Sexual Activity    Alcohol use: Not Currently    Drug use: Never    Sexual activity: Defer   Other Topics Concern    Not on file   Social History Narrative    Not on file     Social Drivers of Health     Financial Resource Strain: Low Risk  (10/28/2024)     Overall Financial Resource Strain (CARDIA)     Difficulty of Paying Living Expenses: Not hard at all   Food Insecurity: No Food Insecurity (10/28/2024)    Hunger Vital Sign     Worried About Running Out of Food in the Last Year: Never true     Ran Out of Food in the Last Year: Never true   Transportation Needs: High Risk (11/3/2024)    Received from [facility] SDOH Screening     Do you have reliable transportation to get you to a physician appointment when required?: Yes   Physical Activity: Not on file   Stress: Not on file   Social Connections: Not on file   Intimate Partner Violence: Not At Risk (10/28/2024)    Humiliation, Afraid, Rape, and Kick questionnaire     Fear of Current or Ex-Partner: No     Emotionally Abused: No     Physically Abused: No     Sexually Abused: No   Housing Stability: Low Risk  (10/28/2024)    Housing Stability Vital Sign     Unable to Pay for Housing in the Last Year: No     Number of Times Moved in the Last Year: 0     Homeless in the Last Year: No     Family History   Problem Relation Name Age of Onset    Kidney failure Father         Current Outpatient Medications   Medication Instructions    albuterol 90 mcg/actuation inhaler 2 puffs, inhalation, Every 4 hours PRN    amLODIPine (NORVASC) 10 mg, Daily    aspirin 81 mg, oral, Daily RT    atorvastatin (LIPITOR) 20 mg, oral, Daily    diclofenac sodium (Voltaren) 1 % gel 1 Application, 4 times daily PRN    furosemide (LASIX) 40 mg, oral, 2 times daily (morning and late afternoon)    gabapentin (NEURONTIN) 100 mg, oral, 3 times daily    losartan (COZAAR) 50 mg, oral, Daily    metFORMIN (GLUCOPHAGE) 1,000 mg, oral, 2 times daily (morning and late afternoon)    oxygen (O2) gas therapy 1 each, inhalation, Every 12 hours    sennosides-docusate sodium (Rosalba-Colace) 8.6-50 mg tablet 2 tablets, oral, 2 times daily    Trulicity 1.5 mg, subcutaneous, Weekly, On Monday         Objective   Physical Exam  Vitals:    12/16/24 2296    BP: 146/82   Pulse: 88   Resp: 18   Temp: 36.6 °C (97.8 °F)   SpO2: 96%       GEN: obese, well-developed, NAD.  CV: RRR, no m/r/g. JVP unable to evaluate.  LUNGS: decreased breath sounds throughout, no w/r/c.  ABD: Soft, distended, NT.  SKIN: Warm, well perfused. No skin rashes or abnormal lesions. LE edema: b/l pitting up to thighs.   MSK: No deformities.  EXT: No clubbing, cyanosis  NEURO:  No focal deficits.    ECG: not obtained yet.    Imaging  Chest x-ray 12/15:  FINDINGS:  Cardiomediastinal silhouette and pulmonary vasculature are within  normal limits. No consolidation, effusion or pneumothorax.      IMPRESSION:  No acute cardiopulmonary process.    Lab Review   Results for orders placed or performed during the hospital encounter of 12/15/24 (from the past 24 hours)   POCT GLUCOSE   Result Value Ref Range    POCT Glucose 244 (H) 74 - 99 mg/dL   Blood Gas Venous Full Panel Unsolicited   Result Value Ref Range    POCT pH, Venous 7.34 7.33 - 7.43 pH    POCT pCO2, Venous 89 (HH) 41 - 51 mm Hg    POCT pO2, Venous 47 (H) 35 - 45 mm Hg    POCT SO2, Venous 74 45 - 75 %    POCT Oxy Hemoglobin, Venous 71.4 45.0 - 75.0 %    POCT Hematocrit Calculated, Venous 45.0 41.0 - 52.0 %    POCT Sodium, Venous 136 136 - 145 mmol/L    POCT Potassium, Venous 4.3 3.5 - 5.3 mmol/L    POCT Chloride, Venous 92 (L) 98 - 107 mmol/L    POCT Ionized Calicum, Venous 1.13 1.10 - 1.33 mmol/L    POCT Glucose, Venous 312 (H) 74 - 99 mg/dL    POCT Lactate, Venous 1.7 0.4 - 2.0 mmol/L    POCT Base Excess, Venous 16.9 (H) -2.0 - 3.0 mmol/L    POCT HCO3 Calculated, Venous 48.0 (H) 22.0 - 26.0 mmol/L    POCT Hemoglobin, Venous 15.1 13.5 - 17.5 g/dL    POCT Anion Gap, Venous 0.0 (L) 10.0 - 25.0 mmol/L    Patient Temperature 37.0 degrees Celsius   CBC and Auto Differential   Result Value Ref Range    WBC 7.0 4.4 - 11.3 x10*3/uL    nRBC 0.3 (H) 0.0 - 0.0 /100 WBCs    RBC 4.71 4.50 - 5.90 x10*6/uL    Hemoglobin 14.6 13.5 - 17.5 g/dL    Hematocrit 44.6  41.0 - 52.0 %    MCV 95 80 - 100 fL    MCH 31.0 26.0 - 34.0 pg    MCHC 32.7 32.0 - 36.0 g/dL    RDW 11.8 11.5 - 14.5 %    Platelets 222 150 - 450 x10*3/uL    Neutrophils % 61.8 40.0 - 80.0 %    Immature Granulocytes %, Automated 0.4 0.0 - 0.9 %    Lymphocytes % 27.5 13.0 - 44.0 %    Monocytes % 7.6 2.0 - 10.0 %    Eosinophils % 2.4 0.0 - 6.0 %    Basophils % 0.3 0.0 - 2.0 %    Neutrophils Absolute 4.33 1.20 - 7.70 x10*3/uL    Immature Granulocytes Absolute, Automated 0.03 0.00 - 0.70 x10*3/uL    Lymphocytes Absolute 1.93 1.20 - 4.80 x10*3/uL    Monocytes Absolute 0.53 0.10 - 1.00 x10*3/uL    Eosinophils Absolute 0.17 0.00 - 0.70 x10*3/uL    Basophils Absolute 0.02 0.00 - 0.10 x10*3/uL   Comprehensive Metabolic Panel   Result Value Ref Range    Glucose 311 (H) 74 - 99 mg/dL    Sodium 140 136 - 145 mmol/L    Potassium 4.1 3.5 - 5.3 mmol/L    Chloride 91 (L) 98 - 107 mmol/L    Bicarbonate 41 (HH) 21 - 32 mmol/L    Anion Gap 12 10 - 20 mmol/L    Urea Nitrogen 12 6 - 23 mg/dL    Creatinine 0.98 0.50 - 1.30 mg/dL    eGFR >90 >60 mL/min/1.73m*2    Calcium 9.1 8.6 - 10.6 mg/dL    Albumin 4.1 3.4 - 5.0 g/dL    Alkaline Phosphatase 83 33 - 120 U/L    Total Protein 7.6 6.4 - 8.2 g/dL    AST 11 9 - 39 U/L    Bilirubin, Total 0.7 0.0 - 1.2 mg/dL    ALT 8 (L) 10 - 52 U/L   Magnesium   Result Value Ref Range    Magnesium 1.71 1.60 - 2.40 mg/dL   Troponin I, High Sensitivity, Initial   Result Value Ref Range    Troponin I, High Sensitivity (CMC) 5 0 - 53 ng/L   B-type natriuretic peptide   Result Value Ref Range    BNP 21 0 - 99 pg/mL   Sars-CoV-2 and Influenza A/B PCR   Result Value Ref Range    Flu A Result Not Detected Not Detected    Flu B Result Not Detected Not Detected    Coronavirus 2019, PCR Not Detected Not Detected   ECG 12 lead   Result Value Ref Range    Ventricular Rate 99 BPM    Atrial Rate 99 BPM    SD Interval 142 ms    QRS Duration 84 ms    QT Interval 324 ms    QTC Calculation(Bazett) 415 ms    P Axis 55 degrees     R Axis -7 degrees    T Axis 50 degrees    QRS Count 17 beats    Q Onset 213 ms    P Onset 142 ms    P Offset 200 ms    T Offset 375 ms    QTC Fredericia 382 ms   ECG 12 lead   Result Value Ref Range    Ventricular Rate 188 BPM    Atrial Rate 174 BPM    FL Interval 120 ms    QRS Duration 80 ms    QT Interval 188 ms    QTC Calculation(Bazett) 332 ms    P Axis -64 degrees    R Axis 98 degrees    T Axis 60 degrees    QRS Count 31 beats    Q Onset 209 ms    P Onset 149 ms    P Offset 197 ms    T Offset 303 ms    QTC Fredericia 275 ms   Troponin, High Sensitivity, 1 Hour   Result Value Ref Range    Troponin I, High Sensitivity (CMC) 7 0 - 53 ng/L   CBC   Result Value Ref Range    WBC 7.5 4.4 - 11.3 x10*3/uL    nRBC 0.3 (H) 0.0 - 0.0 /100 WBCs    RBC 4.44 (L) 4.50 - 5.90 x10*6/uL    Hemoglobin 13.8 13.5 - 17.5 g/dL    Hematocrit 43.0 41.0 - 52.0 %    MCV 97 80 - 100 fL    MCH 31.1 26.0 - 34.0 pg    MCHC 32.1 32.0 - 36.0 g/dL    RDW 11.9 11.5 - 14.5 %    Platelets 214 150 - 450 x10*3/uL   Renal Function Panel   Result Value Ref Range    Glucose 332 (H) 74 - 99 mg/dL    Sodium 139 136 - 145 mmol/L    Potassium 4.3 3.5 - 5.3 mmol/L    Chloride 89 (L) 98 - 107 mmol/L    Bicarbonate 44 (HH) 21 - 32 mmol/L    Anion Gap 10 10 - 20 mmol/L    Urea Nitrogen 12 6 - 23 mg/dL    Creatinine 1.05 0.50 - 1.30 mg/dL    eGFR 87 >60 mL/min/1.73m*2    Calcium 8.8 8.6 - 10.6 mg/dL    Phosphorus 4.2 2.5 - 4.9 mg/dL    Albumin 3.8 3.4 - 5.0 g/dL   Magnesium   Result Value Ref Range    Magnesium 1.66 1.60 - 2.40 mg/dL   POCT GLUCOSE   Result Value Ref Range    POCT Glucose 280 (H) 74 - 99 mg/dL       Troponin I, High Sensitivity   Date/Time Value Ref Range Status   04/05/2024 10:57 AM 4 0 - 53 ng/L Final   04/05/2024 10:16 AM 5 0 - 53 ng/L Final   11/25/2023 08:40 PM 47 0 - 53 ng/L Final     Troponin I, High Sensitivity (CMC)   Date/Time Value Ref Range Status   12/15/2024 10:49 PM 7 0 - 53 ng/L Final   12/15/2024 10:09 PM 5 0 - 53 ng/L Final  "  09/21/2024 07:25 PM 6 0 - 53 ng/L Final     BNP   Date/Time Value Ref Range Status   12/15/2024 10:09 PM 21 0 - 99 pg/mL Final   10/27/2024 03:15 PM 19 0 - 99 pg/mL Final   09/21/2024 06:16 PM 30 0 - 99 pg/mL Final        Assessment and Plan     Eugene Hutson is a 49 y.o. male with a PMHx of HFpEF (EF 63% 10/29/24), HTN, HLD, T2DM (last HgA1c 8.7% 9/26/24) COPD (wears 4LNC at baseline), YANI on CPAP, obesity who was admitted on 12/15/2024 with concern of CHF exacerbation after missing several weeks of diuretics at home. Heart failure was consulted regarding CHF management.     Acute decompensated HF   HFpEF   Patient presented with worsening SOB, LE edema, \"shakes.\" Admit BNP 21 though this can be falsely lower due to obesity, trop 7. Last TTE on 10/29/24 shows LVEF 63%, RV not well visualized. Etiology of heart failure exacerbation is most likely secondary to medication non adherence.   -Patient with a lot of co morbidities without prior ischemic evaluation. Please order noninvasive CT angio coronary to further evaluate.   - Diuresis: continue aggressive diuresis with IV lasix 80mg BID.  - GDMT   - SGLT-2i: start dapagliflozin 10mg daily.    - MRA: start spironolactone 25mg daily.  - Please consider GLP1 therapy with patient's obesity and further studies showing cardiac benefit in early studies.   - Strict I/Os, Daily Na < 2g daily, fluid restriction 2 L daily    For any questions or concerns, feel free to reach out via [employer] chat or page at [phone]. This plan was discussed with Dr. Barrios, HF attending.          SIGNATURE: Sally Rashid DO PATIENT NAME: Eugene Hutson   DATE/TIME: December 16, 2024 9:12 AM MRN: [id number]     "

## 2024-12-17 ENCOUNTER — APPOINTMENT (OUTPATIENT)
Dept: RADIOLOGY | Facility: HOSPITAL | Age: 49
End: 2024-12-17
Payer: MEDICARE

## 2024-12-17 LAB
ALBUMIN SERPL BCP-MCNC: 3.6 G/DL (ref 3.4–5)
ANION GAP SERPL CALC-SCNC: 11 MMOL/L (ref 10–20)
BUN SERPL-MCNC: 17 MG/DL (ref 6–23)
CALCIUM SERPL-MCNC: 8.9 MG/DL (ref 8.6–10.6)
CHLORIDE SERPL-SCNC: 88 MMOL/L (ref 98–107)
CO2 SERPL-SCNC: 44 MMOL/L (ref 21–32)
CREAT SERPL-MCNC: 1.1 MG/DL (ref 0.5–1.3)
EGFRCR SERPLBLD CKD-EPI 2021: 82 ML/MIN/1.73M*2
ERYTHROCYTE [DISTWIDTH] IN BLOOD BY AUTOMATED COUNT: 12 % (ref 11.5–14.5)
GLUCOSE BLD MANUAL STRIP-MCNC: 211 MG/DL (ref 74–99)
GLUCOSE BLD MANUAL STRIP-MCNC: 212 MG/DL (ref 74–99)
GLUCOSE BLD MANUAL STRIP-MCNC: 292 MG/DL (ref 74–99)
GLUCOSE BLD MANUAL STRIP-MCNC: 315 MG/DL (ref 74–99)
GLUCOSE SERPL-MCNC: 250 MG/DL (ref 74–99)
HCT VFR BLD AUTO: 44.1 % (ref 41–52)
HGB BLD-MCNC: 13.6 G/DL (ref 13.5–17.5)
MAGNESIUM SERPL-MCNC: 1.93 MG/DL (ref 1.6–2.4)
MCH RBC QN AUTO: 31.1 PG (ref 26–34)
MCHC RBC AUTO-ENTMCNC: 30.8 G/DL (ref 32–36)
MCV RBC AUTO: 101 FL (ref 80–100)
NRBC BLD-RTO: 0 /100 WBCS (ref 0–0)
PHOSPHATE SERPL-MCNC: 3.9 MG/DL (ref 2.5–4.9)
PLATELET # BLD AUTO: 203 X10*3/UL (ref 150–450)
POTASSIUM SERPL-SCNC: 3.9 MMOL/L (ref 3.5–5.3)
RBC # BLD AUTO: 4.38 X10*6/UL (ref 4.5–5.9)
SODIUM SERPL-SCNC: 139 MMOL/L (ref 136–145)
WBC # BLD AUTO: 6 X10*3/UL (ref 4.4–11.3)

## 2024-12-17 PROCEDURE — 73110 X-RAY EXAM OF WRIST: CPT | Mod: LEFT SIDE | Performed by: RADIOLOGY

## 2024-12-17 PROCEDURE — 99232 SBSQ HOSP IP/OBS MODERATE 35: CPT | Performed by: INTERNAL MEDICINE

## 2024-12-17 PROCEDURE — 2500000004 HC RX 250 GENERAL PHARMACY W/ HCPCS (ALT 636 FOR OP/ED): Performed by: INTERNAL MEDICINE

## 2024-12-17 PROCEDURE — 1200000002 HC GENERAL ROOM WITH TELEMETRY DAILY

## 2024-12-17 PROCEDURE — 73110 X-RAY EXAM OF WRIST: CPT | Mod: LT

## 2024-12-17 PROCEDURE — 5A09357 ASSISTANCE WITH RESPIRATORY VENTILATION, LESS THAN 24 CONSECUTIVE HOURS, CONTINUOUS POSITIVE AIRWAY PRESSURE: ICD-10-PCS | Performed by: INTERNAL MEDICINE

## 2024-12-17 PROCEDURE — 2500000002 HC RX 250 W HCPCS SELF ADMINISTERED DRUGS (ALT 637 FOR MEDICARE OP, ALT 636 FOR OP/ED): Performed by: INTERNAL MEDICINE

## 2024-12-17 PROCEDURE — 36415 COLL VENOUS BLD VENIPUNCTURE: CPT | Performed by: INTERNAL MEDICINE

## 2024-12-17 PROCEDURE — 2500000001 HC RX 250 WO HCPCS SELF ADMINISTERED DRUGS (ALT 637 FOR MEDICARE OP): Performed by: INTERNAL MEDICINE

## 2024-12-17 PROCEDURE — 2500000001 HC RX 250 WO HCPCS SELF ADMINISTERED DRUGS (ALT 637 FOR MEDICARE OP): Performed by: NURSE PRACTITIONER

## 2024-12-17 PROCEDURE — 99233 SBSQ HOSP IP/OBS HIGH 50: CPT | Performed by: STUDENT IN AN ORGANIZED HEALTH CARE EDUCATION/TRAINING PROGRAM

## 2024-12-17 PROCEDURE — 2500000004 HC RX 250 GENERAL PHARMACY W/ HCPCS (ALT 636 FOR OP/ED): Performed by: NURSE PRACTITIONER

## 2024-12-17 PROCEDURE — 2500000005 HC RX 250 GENERAL PHARMACY W/O HCPCS: Performed by: NURSE PRACTITIONER

## 2024-12-17 PROCEDURE — 80069 RENAL FUNCTION PANEL: CPT | Performed by: INTERNAL MEDICINE

## 2024-12-17 PROCEDURE — 2500000002 HC RX 250 W HCPCS SELF ADMINISTERED DRUGS (ALT 637 FOR MEDICARE OP, ALT 636 FOR OP/ED): Performed by: NURSE PRACTITIONER

## 2024-12-17 PROCEDURE — 2500000002 HC RX 250 W HCPCS SELF ADMINISTERED DRUGS (ALT 637 FOR MEDICARE OP, ALT 636 FOR OP/ED): Performed by: STUDENT IN AN ORGANIZED HEALTH CARE EDUCATION/TRAINING PROGRAM

## 2024-12-17 PROCEDURE — 85027 COMPLETE CBC AUTOMATED: CPT | Performed by: INTERNAL MEDICINE

## 2024-12-17 PROCEDURE — 82947 ASSAY GLUCOSE BLOOD QUANT: CPT

## 2024-12-17 PROCEDURE — 83735 ASSAY OF MAGNESIUM: CPT | Performed by: INTERNAL MEDICINE

## 2024-12-17 RX ORDER — METOPROLOL TARTRATE 50 MG/1
100 TABLET ORAL ONCE AS NEEDED
Status: DISCONTINUED | OUTPATIENT
Start: 2024-12-17 | End: 2024-12-18

## 2024-12-17 RX ORDER — METOPROLOL TARTRATE 1 MG/ML
5 INJECTION, SOLUTION INTRAVENOUS ONCE AS NEEDED
Status: DISCONTINUED | OUTPATIENT
Start: 2024-12-17 | End: 2024-12-18

## 2024-12-17 RX ORDER — METOPROLOL TARTRATE 1 MG/ML
5 INJECTION, SOLUTION INTRAVENOUS ONCE
Status: DISCONTINUED | OUTPATIENT
Start: 2024-12-17 | End: 2024-12-18

## 2024-12-17 RX ORDER — NITROGLYCERIN 0.4 MG/1
0.8 TABLET SUBLINGUAL ONCE
Status: DISCONTINUED | OUTPATIENT
Start: 2024-12-17 | End: 2024-12-18

## 2024-12-17 RX ORDER — METOPROLOL TARTRATE 50 MG/1
100 TABLET ORAL ONCE
Status: COMPLETED | OUTPATIENT
Start: 2024-12-17 | End: 2024-12-17

## 2024-12-17 RX ORDER — SPIRONOLACTONE 25 MG/1
25 TABLET ORAL DAILY
Status: DISCONTINUED | OUTPATIENT
Start: 2024-12-17 | End: 2024-12-20 | Stop reason: HOSPADM

## 2024-12-17 RX ORDER — INSULIN LISPRO 100 [IU]/ML
6 INJECTION, SOLUTION INTRAVENOUS; SUBCUTANEOUS ONCE
Status: COMPLETED | OUTPATIENT
Start: 2024-12-17 | End: 2024-12-17

## 2024-12-17 RX ORDER — AMINOPHYLLINE 25 MG/ML
125 INJECTION, SOLUTION INTRAVENOUS AS NEEDED
Status: CANCELLED | OUTPATIENT
Start: 2024-12-17

## 2024-12-17 RX ORDER — DAPAGLIFLOZIN 10 MG/1
10 TABLET, FILM COATED ORAL DAILY
Status: DISCONTINUED | OUTPATIENT
Start: 2024-12-17 | End: 2024-12-20 | Stop reason: HOSPADM

## 2024-12-17 RX ORDER — LORAZEPAM 2 MG/ML
0.5 INJECTION INTRAMUSCULAR EVERY 5 MIN PRN
Status: DISCONTINUED | OUTPATIENT
Start: 2024-12-17 | End: 2024-12-18

## 2024-12-17 RX ORDER — REGADENOSON 0.08 MG/ML
0.4 INJECTION, SOLUTION INTRAVENOUS
Status: CANCELLED | OUTPATIENT
Start: 2024-12-17

## 2024-12-17 RX ORDER — FUROSEMIDE 10 MG/ML
80 INJECTION INTRAMUSCULAR; INTRAVENOUS
Status: DISPENSED | OUTPATIENT
Start: 2024-12-17 | End: 2024-12-19

## 2024-12-17 RX ORDER — GABAPENTIN 300 MG/1
300 CAPSULE ORAL 3 TIMES DAILY
Status: DISCONTINUED | OUTPATIENT
Start: 2024-12-17 | End: 2024-12-20 | Stop reason: HOSPADM

## 2024-12-17 SDOH — ECONOMIC STABILITY: HOUSING INSECURITY: IN THE LAST 12 MONTHS, WAS THERE A TIME WHEN YOU WERE NOT ABLE TO PAY THE MORTGAGE OR RENT ON TIME?: NO

## 2024-12-17 SDOH — SOCIAL STABILITY: SOCIAL INSECURITY: ARE YOU OR HAVE YOU BEEN THREATENED OR ABUSED PHYSICALLY, EMOTIONALLY, OR SEXUALLY BY ANYONE?: NO

## 2024-12-17 SDOH — SOCIAL STABILITY: SOCIAL INSECURITY: WITHIN THE LAST YEAR, HAVE YOU BEEN HUMILIATED OR EMOTIONALLY ABUSED IN OTHER WAYS BY YOUR PARTNER OR EX-PARTNER?: NO

## 2024-12-17 SDOH — ECONOMIC STABILITY: FOOD INSECURITY: WITHIN THE PAST 12 MONTHS, YOU WORRIED THAT YOUR FOOD WOULD RUN OUT BEFORE YOU GOT THE MONEY TO BUY MORE.: NEVER TRUE

## 2024-12-17 SDOH — SOCIAL STABILITY: SOCIAL INSECURITY: HAVE YOU HAD THOUGHTS OF HARMING ANYONE ELSE?: NO

## 2024-12-17 SDOH — SOCIAL STABILITY: SOCIAL INSECURITY: ABUSE: ADULT

## 2024-12-17 SDOH — HEALTH STABILITY: MENTAL HEALTH: HOW OFTEN DO YOU HAVE A DRINK CONTAINING ALCOHOL?: NEVER

## 2024-12-17 SDOH — SOCIAL STABILITY: SOCIAL INSECURITY: DOES ANYONE TRY TO KEEP YOU FROM HAVING/CONTACTING OTHER FRIENDS OR DOING THINGS OUTSIDE YOUR HOME?: NO

## 2024-12-17 SDOH — SOCIAL STABILITY: SOCIAL NETWORK: HOW OFTEN DO YOU ATTEND CHURCH OR RELIGIOUS SERVICES?: MORE THAN 4 TIMES PER YEAR

## 2024-12-17 SDOH — ECONOMIC STABILITY: INCOME INSECURITY: IN THE PAST 12 MONTHS HAS THE ELECTRIC, GAS, OIL, OR WATER COMPANY THREATENED TO SHUT OFF SERVICES IN YOUR HOME?: NO

## 2024-12-17 SDOH — SOCIAL STABILITY: SOCIAL INSECURITY: ARE THERE ANY APPARENT SIGNS OF INJURIES/BEHAVIORS THAT COULD BE RELATED TO ABUSE/NEGLECT?: NO

## 2024-12-17 SDOH — SOCIAL STABILITY: SOCIAL INSECURITY: ARE YOU MARRIED, WIDOWED, DIVORCED, SEPARATED, NEVER MARRIED, OR LIVING WITH A PARTNER?: MARRIED

## 2024-12-17 SDOH — SOCIAL STABILITY: SOCIAL INSECURITY: DO YOU FEEL UNSAFE GOING BACK TO THE PLACE WHERE YOU ARE LIVING?: NO

## 2024-12-17 SDOH — HEALTH STABILITY: MENTAL HEALTH: HOW OFTEN DO YOU HAVE SIX OR MORE DRINKS ON ONE OCCASION?: NEVER

## 2024-12-17 SDOH — SOCIAL STABILITY: SOCIAL INSECURITY: WITHIN THE LAST YEAR, HAVE YOU BEEN AFRAID OF YOUR PARTNER OR EX-PARTNER?: NO

## 2024-12-17 SDOH — SOCIAL STABILITY: SOCIAL NETWORK: HOW OFTEN DO YOU GET TOGETHER WITH FRIENDS OR RELATIVES?: THREE TIMES A WEEK

## 2024-12-17 SDOH — SOCIAL STABILITY: SOCIAL INSECURITY: HAS ANYONE EVER THREATENED TO HURT YOUR FAMILY OR YOUR PETS?: NO

## 2024-12-17 SDOH — ECONOMIC STABILITY: FOOD INSECURITY: WITHIN THE PAST 12 MONTHS, THE FOOD YOU BOUGHT JUST DIDN'T LAST AND YOU DIDN'T HAVE MONEY TO GET MORE.: NEVER TRUE

## 2024-12-17 SDOH — SOCIAL STABILITY: SOCIAL NETWORK: HOW OFTEN DO YOU ATTEND MEETINGS OF THE CLUBS OR ORGANIZATIONS YOU BELONG TO?: MORE THAN 4 TIMES PER YEAR

## 2024-12-17 SDOH — ECONOMIC STABILITY: FOOD INSECURITY: HOW HARD IS IT FOR YOU TO PAY FOR THE VERY BASICS LIKE FOOD, HOUSING, MEDICAL CARE, AND HEATING?: SOMEWHAT HARD

## 2024-12-17 SDOH — SOCIAL STABILITY: SOCIAL NETWORK
DO YOU BELONG TO ANY CLUBS OR ORGANIZATIONS SUCH AS CHURCH GROUPS, UNIONS, FRATERNAL OR ATHLETIC GROUPS, OR SCHOOL GROUPS?: YES

## 2024-12-17 SDOH — HEALTH STABILITY: PHYSICAL HEALTH
HOW OFTEN DO YOU NEED TO HAVE SOMEONE HELP YOU WHEN YOU READ INSTRUCTIONS, PAMPHLETS, OR OTHER WRITTEN MATERIAL FROM YOUR DOCTOR OR PHARMACY?: NEVER

## 2024-12-17 SDOH — ECONOMIC STABILITY: HOUSING INSECURITY: IN THE PAST 12 MONTHS, HOW MANY TIMES HAVE YOU MOVED WHERE YOU WERE LIVING?: 0

## 2024-12-17 SDOH — SOCIAL STABILITY: SOCIAL INSECURITY: HAVE YOU HAD ANY THOUGHTS OF HARMING ANYONE ELSE?: NO

## 2024-12-17 SDOH — HEALTH STABILITY: PHYSICAL HEALTH: ON AVERAGE, HOW MANY MINUTES DO YOU ENGAGE IN EXERCISE AT THIS LEVEL?: 150+ MIN

## 2024-12-17 SDOH — HEALTH STABILITY: PHYSICAL HEALTH: ON AVERAGE, HOW MANY DAYS PER WEEK DO YOU ENGAGE IN MODERATE TO STRENUOUS EXERCISE (LIKE A BRISK WALK)?: 7 DAYS

## 2024-12-17 SDOH — HEALTH STABILITY: MENTAL HEALTH: HOW MANY DRINKS CONTAINING ALCOHOL DO YOU HAVE ON A TYPICAL DAY WHEN YOU ARE DRINKING?: PATIENT DOES NOT DRINK

## 2024-12-17 SDOH — SOCIAL STABILITY: SOCIAL INSECURITY: WERE YOU ABLE TO COMPLETE ALL THE BEHAVIORAL HEALTH SCREENINGS?: YES

## 2024-12-17 SDOH — ECONOMIC STABILITY: HOUSING INSECURITY: AT ANY TIME IN THE PAST 12 MONTHS, WERE YOU HOMELESS OR LIVING IN A SHELTER (INCLUDING NOW)?: NO

## 2024-12-17 SDOH — ECONOMIC STABILITY: TRANSPORTATION INSECURITY: IN THE PAST 12 MONTHS, HAS LACK OF TRANSPORTATION KEPT YOU FROM MEDICAL APPOINTMENTS OR FROM GETTING MEDICATIONS?: NO

## 2024-12-17 SDOH — SOCIAL STABILITY: SOCIAL NETWORK: IN A TYPICAL WEEK, HOW MANY TIMES DO YOU TALK ON THE PHONE WITH FAMILY, FRIENDS, OR NEIGHBORS?: NEVER

## 2024-12-17 SDOH — SOCIAL STABILITY: SOCIAL INSECURITY: DO YOU FEEL ANYONE HAS EXPLOITED OR TAKEN ADVANTAGE OF YOU FINANCIALLY OR OF YOUR PERSONAL PROPERTY?: NO

## 2024-12-17 ASSESSMENT — COGNITIVE AND FUNCTIONAL STATUS - GENERAL
MOBILITY SCORE: 24
DAILY ACTIVITIY SCORE: 24
PATIENT BASELINE BEDBOUND: NO
DAILY ACTIVITIY SCORE: 24
MOBILITY SCORE: 24
DAILY ACTIVITIY SCORE: 24
MOBILITY SCORE: 24

## 2024-12-17 ASSESSMENT — ACTIVITIES OF DAILY LIVING (ADL)
BATHING: INDEPENDENT
LACK_OF_TRANSPORTATION: NO
PATIENT'S MEMORY ADEQUATE TO SAFELY COMPLETE DAILY ACTIVITIES?: YES
GROOMING: INDEPENDENT
HEARING - LEFT EAR: FUNCTIONAL
FEEDING YOURSELF: INDEPENDENT
ADEQUATE_TO_COMPLETE_ADL: YES
JUDGMENT_ADEQUATE_SAFELY_COMPLETE_DAILY_ACTIVITIES: YES
DRESSING YOURSELF: INDEPENDENT
TOILETING: INDEPENDENT
LACK_OF_TRANSPORTATION: NO
HEARING - RIGHT EAR: FUNCTIONAL
WALKS IN HOME: INDEPENDENT
LACK_OF_TRANSPORTATION: NO

## 2024-12-17 ASSESSMENT — PATIENT HEALTH QUESTIONNAIRE - PHQ9
2. FEELING DOWN, DEPRESSED OR HOPELESS: NOT AT ALL
SUM OF ALL RESPONSES TO PHQ9 QUESTIONS 1 & 2: 0
1. LITTLE INTEREST OR PLEASURE IN DOING THINGS: NOT AT ALL

## 2024-12-17 ASSESSMENT — LIFESTYLE VARIABLES
SUBSTANCE_ABUSE_PAST_12_MONTHS: NO
PRESCIPTION_ABUSE_PAST_12_MONTHS: NO
HOW OFTEN DO YOU HAVE A DRINK CONTAINING ALCOHOL: NEVER
SKIP TO QUESTIONS 9-10: 1
HOW OFTEN DO YOU HAVE 6 OR MORE DRINKS ON ONE OCCASION: NEVER
SKIP TO QUESTIONS 9-10: 1
AUDIT-C TOTAL SCORE: 0
HOW MANY STANDARD DRINKS CONTAINING ALCOHOL DO YOU HAVE ON A TYPICAL DAY: PATIENT DOES NOT DRINK
AUDIT-C TOTAL SCORE: 0
AUDIT-C TOTAL SCORE: 0

## 2024-12-17 ASSESSMENT — PAIN SCALES - GENERAL: PAINLEVEL_OUTOF10: 0 - NO PAIN

## 2024-12-17 NOTE — PROGRESS NOTES
12/17/24 1419   Discharge Planning   Living Arrangements Spouse/significant other   Support Systems Spouse/significant other   Assistance Needed independent with ADL's   Type of Residence Private residence   Home or Post Acute Services None   Expected Discharge Disposition Home   Does the patient need discharge transport arranged? No   Financial Resource Strain   How hard is it for you to pay for the very basics like food, housing, medical care, and heating? Not very   Housing Stability   In the last 12 months, was there a time when you were not able to pay the mortgage or rent on time? N   In the past 12 months, how many times have you moved where you were living? 0   At any time in the past 12 months, were you homeless or living in a shelter (including now)? N   Transportation Needs   In the past 12 months, has lack of transportation kept you from medical appointments or from getting medications? no   In the past 12 months, has lack of transportation kept you from meetings, work, or from getting things needed for daily living? No     Plan per Medical/Surgical team: Patient is admitted for heart failure. Cardiology consulted. MD will order a CT angio and plan to diurese.    Assessment Note: Patient lives with his spouse. He is up and independent. He wears 4 liters of home oxygen. Denies any financial or social work needs. Family will provide transportation at discharge.    Home Care: denies  PCP: [facility] provider  Pharmacy:  DME: home oxygen  Falls: denies  Dialysis: denies     Potential Barriers: none  Discharge Disposition: home  ADOD: 1-3 days

## 2024-12-17 NOTE — CARE PLAN
Problem: Fall/Injury  Goal: Not fall by end of shift  Outcome: Progressing  Goal: Be free from injury by end of the shift  Outcome: Progressing  Goal: Verbalize understanding of personal risk factors for fall in the hospital  Outcome: Progressing  Goal: Verbalize understanding of risk factor reduction measures to prevent injury from fall in the home  Outcome: Progressing   The patient's goals for the shift include To be able to have the CPAP tonight. and not to have shortness of breath.    The clinical goals for the shift include Patient will have no shortness of breath this shift.

## 2024-12-17 NOTE — PROGRESS NOTES
Subjective Data:  Report improvement in SOB. Does not think much improvement in swelling with current diuretic regimen. Had some L arm tingling/shakes that made him drop his drink yesterday, which he also slipped on.      Objective Data:  Last Recorded Vitals:  Vitals:    12/16/24 2301 12/17/24 0336 12/17/24 0700 12/17/24 0929   BP: 142/90 139/80  132/90   BP Location:       Patient Position:       Pulse: 87 92  83   Resp:  13     Temp: 36.5 °C (97.7 °F) 36.1 °C (97 °F)  36.5 °C (97.7 °F)   TempSrc:       SpO2: 96% 93%  94%   Weight:   (!) 226 kg (497 lb 8 oz)    Height:           Last Labs:  CBC - 12/17/2024:  5:06 AM  6.0 13.6 203    44.1      CMP - 12/17/2024:  5:06 AM  8.9 7.6 11 --- 0.7   3.9 3.6 8 83      PTT - No results in last year.  1.1   12.7 _     TROPHS   Date/Time Value Ref Range Status   12/15/2024 10:49 PM 7 0 - 53 ng/L Final   12/15/2024 10:09 PM 5 0 - 53 ng/L Final   09/21/2024 07:25 PM 6 0 - 53 ng/L Final   04/05/2024 10:57 AM 4 0 - 53 ng/L Final   04/05/2024 10:16 AM 5 0 - 53 ng/L Final   11/25/2023 08:40 PM 47 0 - 53 ng/L Final     BNP   Date/Time Value Ref Range Status   12/15/2024 10:09 PM 21 0 - 99 pg/mL Final   10/27/2024 03:15 PM 19 0 - 99 pg/mL Final     HGBA1C   Date/Time Value Ref Range Status   09/26/2024 12:05 PM 8.7 4.3 - 5.6 % Final     Comment:     American Diabetes Association guidelines indicate that patients with HgbA1c in the range 5.7-6.4% are at increased risk for development of diabetes, and intervention by lifestyle modification may be beneficial. HgbA1c greater or equal to 6.5% is considered diagnostic of diabetes.   03/06/2024 11:14 AM 6.9 4.3 - 5.6 % Final     Comment:     Location:[facility], [address], [place], [address]  Point of care (POC) Hemoglobin A1c (HGBA1C) testing is intended to assess  glucose control and provide a management tool for patients known to have  diabetes and their healthcare providers.  Target HGBA1C  levels may depend on  specific clinical circumstances.  POC HGBA1C is not intended for use as a  diagnostic or screening test; laboratory-based testing should be used for  diagnostic purposes.  The following information is supplemental and may not  be applicable to specific diabetes management situations:  The POC device   provides a normal range of 4.2% to 6.5% for the HGBA1C POC test.  However, the American Diabetes Association  guidelines indicate that  patients with HGBA1C in the range of 5.7% to 6.4% are at increased risk for  development of diabetes and that intervention by lifestyle modification may  be beneficial.  A HGBA1C level greater than or equal to 6.5% is considered  diagnostic of diabetes, pending confirmatory testing.  Use of HGBA1C testing  to evaluate glucose control may not be appropriate for patients with  hemoglobin variants or other conditions (e.g. anemia) that alter red blood  cell lifespan.   10/17/2023 12:09 PM 6.2 4.3 - 5.6 % Final     Comment:     American Diabetes Association guidelines indicate that patients with HgbA1c in the range 5.7-6.4% are at increased risk for development of diabetes, and intervention by lifestyle modification may be beneficial. HgbA1c greater or equal to 6.5% is considered diagnostic of diabetes.      Last I/O:  I/O last 3 completed shifts:  In: 830 (3.7 mL/kg) [P.O.:830]  Out: 2650 (11.7 mL/kg) [Urine:2650 (0.3 mL/kg/hr)]  Weight: 226.4 kg     Past Cardiology Tests (Last 3 Years):  EKG:  ECG 12 lead 12/15/2024    Echo:  Transthoracic Echo (TTE) Complete 10/29/2024  CONCLUSIONS:   1. Left ventricular ejection fraction is normal, calculated by Beckford's biplane at 63%.   2. Left ventricular diastolic filling was indeterminate.   3. Unable to determine right ventricular systolic function.   4. Compared with the prior exam from 11/28/2023 both studies were technically very difficult but both showed normal LV systolic function without regional wall  motion abnormalities. The remaining information gained was quite limited.    Ejection Fractions:  EF   Date/Time Value Ref Range Status   10/29/2024 12:42 PM 63 %      Cath:  No results found for this or any previous visit from the past 1095 days.    Stress Test:  No results found for this or any previous visit from the past 1095 days.    Cardiac Imaging:  No results found for this or any previous visit from the past 1095 days.    Inpatient Medications:  Scheduled medications   Medication Dose Route Frequency    amLODIPine  10 mg oral Daily    aspirin  81 mg oral Daily    atorvastatin  20 mg oral Nightly    dapagliflozin propanediol  10 mg oral Daily    enoxaparin  60 mg subcutaneous q12h NADER    furosemide  80 mg intravenous BID    gabapentin  100 mg oral TID    insulin lispro  0-10 Units subcutaneous TID AC    losartan  50 mg oral Daily    metoprolol  5 mg intravenous Once    nitroglycerin  0.8 mg sublingual Once    oxygen   inhalation q12h    sennosides-docusate sodium  2 tablet oral BID    spironolactone  25 mg oral Daily     PRN medications   Medication    acetaminophen    albuterol    dextrose    dextrose    glucagon    glucagon    ipratropium-albuteroL    LORazepam    melatonin    metoprolol    metoprolol    metoprolol    metoprolol    metoprolol tartrate     Continuous Medications   Medication Dose Last Rate     Physical Exam:  GEN: obese, well-developed, NAD.  CV: RRR, no m/r/g. JVP unable to evaluate.  LUNGS: decreased breath sounds throughout, no w/r/c.  ABD: Soft, distended, NT.  SKIN: Warm, well perfused. No skin rashes or abnormal lesions. LE edema: b/l pitting up to thighs.   MSK: No deformities.  EXT: No clubbing, cyanosis  NEURO:  No focal deficits.     Assessment/Plan   Eugene Hutson is a 49 y.o. male with a PMHx of HFpEF (EF 63% 10/29/24), HTN, HLD, T2DM (last HgA1c 8.7% 9/26/24) COPD (wears 4LNC at baseline), YANI on CPAP, obesity who was admitted on 12/15/2024 with concern of CHF exacerbation  "after missing several weeks of diuretics at home. Heart failure was consulted regarding CHF management.      Acute decompensated HF   HFpEF   Patient presented with worsening SOB, LE edema, \"shakes.\" Admit BNP 21 though this can be falsely lower due to obesity, trop 7. Last TTE on 10/29/24 shows LVEF 63%, RV not well visualized. Etiology of heart failure exacerbation is most likely secondary to medication non adherence.   -Patient with a lot of co morbidities without prior ischemic evaluation. Pending CT angio coronary to further evaluate.   - Diuresis: continue aggressive diuresis with IV lasix 80mg BID. Please obtain strict I&Os.  - GDMT              - SGLT-2i: C/w dapagliflozin 10mg daily.               - MRA: C/w spironolactone 25mg daily.  - Strict I/Os, Daily Na < 2g daily, fluid restriction 2 L daily     For any questions or concerns, feel free to reach out via GiftMe chat or page at [phone]. This plan was discussed with Dr. Barrios, HF attending.      Sally Rashid, DO  HF Fellow  "

## 2024-12-17 NOTE — PROGRESS NOTES
Eugene Hutson is a 49 y.o. male on day 1 of admission presenting with Acute on chronic diastolic CHF (congestive heart failure).      Subjective   No event over night.     Reports no chest pain.   Still reporting SOB and leg edema.        Objective     Last Recorded Vitals  /90 (BP Location: Right arm, Patient Position: Sitting)   Pulse 83   Temp 36.5 °C (97.7 °F) (Temporal)   Resp 16   Wt (!) 226 kg (497 lb 8 oz)   SpO2 94%   Intake/Output last 3 Shifts:    Intake/Output Summary (Last 24 hours) at 12/17/2024 1253  Last data filed at 12/17/2024 1200  Gross per 24 hour   Intake 550 ml   Output 4150 ml   Net -3600 ml       Admission Weight  Weight: (!) 220 kg (485 lb 0.2 oz) (12/15/24 2205)    Daily Weight  12/17/24 : (!) 226 kg (497 lb 8 oz)    Image Results  ECG 12 lead  Undetermined rhythm  Rightward axis  ST elevation, consider inferior injury or acute infarct  ** ** ACUTE MI / STEMI ** **  Abnormal ECG  When compared with ECG of 27-OCT-2024 14:52,  Significant changes have occurred  See ED provider note for full interpretation and clinical correlation  Confirmed by Remigio Elam ([id number]) on 12/15/2024 11:14:02 PM  ECG 12 lead  Normal sinus rhythm  Normal ECG  When compared with ECG of 15-DEC-2024 22:05,  Previous ECG has undetermined rhythm, needs review  Questionable change in QRS axis  ST no longer elevated in Inferior leads  ST no longer depressed in Anterolateral leads  T wave inversion no longer evident in Lateral leads  See ED provider note for full interpretation and clinical correlation  Confirmed by Remigio Elam ([id number]) on 12/15/2024 11:13:55 PM  XR abdomen 1 view  Narrative: Interpreted By:  Demarcus Box and Sheng Max   STUDY:  XR ABDOMEN 1 VIEW;  12/15/2024 10:39 pm      INDICATION:  Signs/Symptoms:KUB for stool burden.          COMPARISON:  Chest radiographs 12/15/2024, 10/27/2024      ACCESSION NUMBER(S):  [id number]      ORDERING CLINICIAN:  EDGAR QUICK      FINDINGS:  Moderate  colonic stool burden with nonobstructive bowel gas pattern.  Limited evaluation of pneumoperitoneum on supine imaging, however no  gross evidence of free air is noted.      Visualized lungs are clear.      Osseous structures demonstrate no acute bony changes.      Impression: 1. Moderate colonic stool burden with nonobstructive bowel gas  pattern.          I personally reviewed the images/study and I agree with the findings  as stated by Dr. Franko Geronimo. This study was interpreted at [facility], [place], [place].      MACRO:  None      Signed by: Demarcus Box 12/15/2024 10:52 PM  Dictation workstation:   [id number]  XR chest 2 views  Narrative: Interpreted By:  Demarcus Box,   STUDY:  XR CHEST 2 VIEWS;  12/15/2024 10:39 pm      INDICATION:  Signs/Symptoms:Chest Pain.      COMPARISON:  None.      ACCESSION NUMBER(S):  [id number]      ORDERING CLINICIAN:  BRENDA CHRISTIAN      FINDINGS:  Cardiomediastinal silhouette and pulmonary vasculature are within  normal limits. No consolidation, effusion or pneumothorax.      Impression: No acute cardiopulmonary process.      MACRO:  None      Signed by: Demarcus Box 12/15/2024 10:40 PM  Dictation workstation:   [id number]      Physical Exam  Constitutional:       Appearance: He is obese.      Comments: Looks comfortable on supplemental oxygen    HENT:      Head: Normocephalic.      Nose: Nose normal.   Eyes:      Extraocular Movements: Extraocular movements intact.      Pupils: Pupils are equal, round, and reactive to light.   Cardiovascular:      Rate and Rhythm: Normal rate and regular rhythm.      Heart sounds: Normal heart sounds. No murmur heard.  Pulmonary:      Effort: No respiratory distress.      Breath sounds: Normal breath sounds. No wheezing.   Abdominal:      General: There is no distension.      Palpations: Abdomen is soft.      Tenderness: There is no abdominal tenderness.   Musculoskeletal:         General: No swelling.  Normal range of motion.      Cervical back: Normal range of motion.      Right lower leg: Edema present.      Left lower leg: Edema present.   Skin:     General: Skin is warm.   Neurological:      General: No focal deficit present.      Mental Status: He is alert and oriented to person, place, and time. Mental status is at baseline.   Psychiatric:         Mood and Affect: Mood normal.         Relevant Results  Scheduled medications  amLODIPine, 10 mg, oral, Daily  aspirin, 81 mg, oral, Daily  atorvastatin, 20 mg, oral, Nightly  dapagliflozin propanediol, 10 mg, oral, Daily  enoxaparin, 60 mg, subcutaneous, q12h NADER  furosemide, 80 mg, intravenous, BID  gabapentin, 300 mg, oral, TID  insulin lispro, 0-10 Units, subcutaneous, TID AC  losartan, 50 mg, oral, Daily  metoprolol, 5 mg, intravenous, Once  nitroglycerin, 0.8 mg, sublingual, Once  oxygen, , inhalation, q12h  sennosides-docusate sodium, 2 tablet, oral, BID  spironolactone, 25 mg, oral, Daily      Continuous medications     PRN medications  PRN medications: acetaminophen, albuterol, dextrose, dextrose, glucagon, glucagon, ipratropium-albuteroL, LORazepam, melatonin, metoprolol, metoprolol, metoprolol, metoprolol, metoprolol tartrate    Results for orders placed or performed during the hospital encounter of 12/15/24 (from the past 24 hours)   POCT GLUCOSE   Result Value Ref Range    POCT Glucose 312 (H) 74 - 99 mg/dL   CBC   Result Value Ref Range    WBC 6.0 4.4 - 11.3 x10*3/uL    nRBC 0.0 0.0 - 0.0 /100 WBCs    RBC 4.38 (L) 4.50 - 5.90 x10*6/uL    Hemoglobin 13.6 13.5 - 17.5 g/dL    Hematocrit 44.1 41.0 - 52.0 %     (H) 80 - 100 fL    MCH 31.1 26.0 - 34.0 pg    MCHC 30.8 (L) 32.0 - 36.0 g/dL    RDW 12.0 11.5 - 14.5 %    Platelets 203 150 - 450 x10*3/uL   Magnesium   Result Value Ref Range    Magnesium 1.93 1.60 - 2.40 mg/dL   Renal Function Panel   Result Value Ref Range    Glucose 250 (H) 74 - 99 mg/dL    Sodium 139 136 - 145 mmol/L    Potassium 3.9 3.5  - 5.3 mmol/L    Chloride 88 (L) 98 - 107 mmol/L    Bicarbonate 44 (HH) 21 - 32 mmol/L    Anion Gap 11 10 - 20 mmol/L    Urea Nitrogen 17 6 - 23 mg/dL    Creatinine 1.10 0.50 - 1.30 mg/dL    eGFR 82 >60 mL/min/1.73m*2    Calcium 8.9 8.6 - 10.6 mg/dL    Phosphorus 3.9 2.5 - 4.9 mg/dL    Albumin 3.6 3.4 - 5.0 g/dL   POCT GLUCOSE   Result Value Ref Range    POCT Glucose 292 (H) 74 - 99 mg/dL   POCT GLUCOSE   Result Value Ref Range    POCT Glucose 211 (H) 74 - 99 mg/dL   POCT GLUCOSE   Result Value Ref Range    POCT Glucose 212 (H) 74 - 99 mg/dL       Assessment/Plan       Eugene Hutson is a 49 y.o. male with a PMH of morbid obesity BMI 60, HFpEF (EF 63% 10/29/24), HTN, HLD, T2DM (last HgA1c 8.7% 9/26/24) COPD (wears 4LNC at baseline), and YANI who is presented to [facility] ED for further evaluation of SOB and worsening BLE edema due to acute decompensated heart failure. Last hospitalization per chart review was 10/27/24-10/30/24 due to acute on chronic diastolic heart failure after IV diuresis,  on Lasix 40 mg PO BID.       #Acute on chronic diastolic heart failure  - BNP 21 on admit   - Echocardiogram (10/29/24): Left ventricular ejection fraction is normal, calculated by Beckford's biplane at 63%.Left ventricular diastolic filling was indeterminate.  Unable to determine right ventricular systolic function.Compared with the prior exam from 11/28/2023 both studies were technically very difficult but both showed normal LV systolic function without regional wall motion abnormalities. The remaining information gained was quite limited.  - Chest X-ray: No acute cardiopulmonary process.   - Respiratory Regimen: Duonebs Q4hr PRN, Incentive spirometry, Supplemental oxygen PRN   - Continue Lasix 80mg IV BID  - Monitor renal function and electrolytes closely while on aggressive IV diuretic therapy   - Daily Weights (standing), Strict I/Os (record results under I&O each shift)  -HF team consulted, recs appreciated,   Started on  Spironolactone 25 mg and Farxiga 10 mg daily.   - RFP and Mg2+ level ordered for AM lab draw  - 2g Na diet/ 2L fluid restriction  Nuclear stress test tomorrow  NPO after midnight,      #COPD (wears 4LNC at baseline)  #YANI  - c/w Albuterol MDI PRN and Duoneb q4h PRN   - no recent PFTs found in medical record  - maintain Pox>92%, wean as tolerated back to baseline O2  - CPAP ordered (auto-titrating)     #T2DM (last HgA1c 8.7% 9/26/24) c/b neuropathy  - accucheck ACHS  - home regimen: trulicity 1.5 mg and Metformin 1000 mg PO BID  - holding home dose of Metformin during admit will resume on discharge  - Lispro SSI (0-10 units) ordered   - accucheck ACHS  - c/w Gabapentin 100 mg PO TID     #HTN  - last /91  - continue to monitor BP  - c/w home dose of Norvasc 10 mg PO every day and Losartan 50 mg PO every day     #HLD  - c/w Lipitor 20 mg PO at bedtime    Prophylaxis: S/c Lovenox.      Dispo: Pending improvement in fluid status.     Ace Lynn MD

## 2024-12-18 LAB
ALBUMIN SERPL BCP-MCNC: 3.7 G/DL (ref 3.4–5)
ANION GAP SERPL CALC-SCNC: 10 MMOL/L (ref 10–20)
BUN SERPL-MCNC: 16 MG/DL (ref 6–23)
CALCIUM SERPL-MCNC: 9 MG/DL (ref 8.6–10.6)
CHLORIDE SERPL-SCNC: 89 MMOL/L (ref 98–107)
CO2 SERPL-SCNC: 45 MMOL/L (ref 21–32)
CREAT SERPL-MCNC: 1.09 MG/DL (ref 0.5–1.3)
EGFRCR SERPLBLD CKD-EPI 2021: 83 ML/MIN/1.73M*2
ERYTHROCYTE [DISTWIDTH] IN BLOOD BY AUTOMATED COUNT: 12 % (ref 11.5–14.5)
GLUCOSE BLD MANUAL STRIP-MCNC: 207 MG/DL (ref 74–99)
GLUCOSE BLD MANUAL STRIP-MCNC: 212 MG/DL (ref 74–99)
GLUCOSE BLD MANUAL STRIP-MCNC: 260 MG/DL (ref 74–99)
GLUCOSE BLD MANUAL STRIP-MCNC: 271 MG/DL (ref 74–99)
GLUCOSE SERPL-MCNC: 188 MG/DL (ref 74–99)
HCT VFR BLD AUTO: 46.3 % (ref 41–52)
HGB BLD-MCNC: 14.5 G/DL (ref 13.5–17.5)
MAGNESIUM SERPL-MCNC: 2.1 MG/DL (ref 1.6–2.4)
MCH RBC QN AUTO: 31 PG (ref 26–34)
MCHC RBC AUTO-ENTMCNC: 31.3 G/DL (ref 32–36)
MCV RBC AUTO: 99 FL (ref 80–100)
NRBC BLD-RTO: 0 /100 WBCS (ref 0–0)
PHOSPHATE SERPL-MCNC: 4.7 MG/DL (ref 2.5–4.9)
PLATELET # BLD AUTO: 210 X10*3/UL (ref 150–450)
POTASSIUM SERPL-SCNC: 3.7 MMOL/L (ref 3.5–5.3)
RBC # BLD AUTO: 4.67 X10*6/UL (ref 4.5–5.9)
SODIUM SERPL-SCNC: 140 MMOL/L (ref 136–145)
WBC # BLD AUTO: 5.9 X10*3/UL (ref 4.4–11.3)

## 2024-12-18 PROCEDURE — 83735 ASSAY OF MAGNESIUM: CPT | Performed by: INTERNAL MEDICINE

## 2024-12-18 PROCEDURE — 2500000001 HC RX 250 WO HCPCS SELF ADMINISTERED DRUGS (ALT 637 FOR MEDICARE OP): Performed by: NURSE PRACTITIONER

## 2024-12-18 PROCEDURE — 2500000002 HC RX 250 W HCPCS SELF ADMINISTERED DRUGS (ALT 637 FOR MEDICARE OP, ALT 636 FOR OP/ED): Performed by: INTERNAL MEDICINE

## 2024-12-18 PROCEDURE — 2500000001 HC RX 250 WO HCPCS SELF ADMINISTERED DRUGS (ALT 637 FOR MEDICARE OP): Performed by: INTERNAL MEDICINE

## 2024-12-18 PROCEDURE — 82947 ASSAY GLUCOSE BLOOD QUANT: CPT

## 2024-12-18 PROCEDURE — 2500000002 HC RX 250 W HCPCS SELF ADMINISTERED DRUGS (ALT 637 FOR MEDICARE OP, ALT 636 FOR OP/ED): Performed by: NURSE PRACTITIONER

## 2024-12-18 PROCEDURE — 99232 SBSQ HOSP IP/OBS MODERATE 35: CPT | Performed by: INTERNAL MEDICINE

## 2024-12-18 PROCEDURE — 80069 RENAL FUNCTION PANEL: CPT | Performed by: INTERNAL MEDICINE

## 2024-12-18 PROCEDURE — 85027 COMPLETE CBC AUTOMATED: CPT | Performed by: INTERNAL MEDICINE

## 2024-12-18 PROCEDURE — 2500000004 HC RX 250 GENERAL PHARMACY W/ HCPCS (ALT 636 FOR OP/ED): Performed by: NURSE PRACTITIONER

## 2024-12-18 PROCEDURE — 1200000002 HC GENERAL ROOM WITH TELEMETRY DAILY

## 2024-12-18 PROCEDURE — 2500000004 HC RX 250 GENERAL PHARMACY W/ HCPCS (ALT 636 FOR OP/ED): Performed by: INTERNAL MEDICINE

## 2024-12-18 PROCEDURE — 2500000005 HC RX 250 GENERAL PHARMACY W/O HCPCS: Performed by: NURSE PRACTITIONER

## 2024-12-18 PROCEDURE — 36415 COLL VENOUS BLD VENIPUNCTURE: CPT | Performed by: INTERNAL MEDICINE

## 2024-12-18 RX ORDER — ATROPINE SULFATE 0.4 MG/ML
.25-2 INJECTION, SOLUTION ENDOTRACHEAL; INTRAMEDULLARY; INTRAMUSCULAR; INTRAVENOUS; SUBCUTANEOUS
Status: CANCELLED | OUTPATIENT
Start: 2024-12-18

## 2024-12-18 RX ORDER — POTASSIUM CHLORIDE 20 MEQ/1
40 TABLET, EXTENDED RELEASE ORAL DAILY
Status: DISCONTINUED | OUTPATIENT
Start: 2024-12-18 | End: 2024-12-20 | Stop reason: HOSPADM

## 2024-12-18 RX ORDER — DOBUTAMINE HYDROCHLORIDE 100 MG/100ML
5-40 INJECTION INTRAVENOUS CONTINUOUS
Status: CANCELLED | OUTPATIENT
Start: 2024-12-18

## 2024-12-18 RX ORDER — ACETAZOLAMIDE 250 MG/1
250 TABLET ORAL 2 TIMES DAILY
Status: DISPENSED | OUTPATIENT
Start: 2024-12-18 | End: 2024-12-19

## 2024-12-18 ASSESSMENT — PAIN SCALES - WONG BAKER
WONGBAKER_NUMERICALRESPONSE: NO HURT

## 2024-12-18 ASSESSMENT — PAIN SCALES - PAIN ASSESSMENT IN ADVANCED DEMENTIA (PAINAD)
FACIALEXPRESSION: SMILING OR INEXPRESSIVE
TOTALSCORE: 0
BODYLANGUAGE: RELAXED
CONSOLABILITY: NO NEED TO CONSOLE
BREATHING: NORMAL

## 2024-12-18 ASSESSMENT — PAIN SCALES - GENERAL
PAINLEVEL_OUTOF10: 0 - NO PAIN

## 2024-12-18 NOTE — CONSULTS
Heart Failure Nurse Navigator    The role of the HF nurse navigator is to (1) characterize risk profiles of patients with heart failure transitioning from [id number] after hospitalization, (2) recommend interventions to improve care and reduce risks of worse post-hospitalization outcomes. Potential recommendations may touch base on patient/family education, additional consults that may bring value, and appointment scheduling.    History    I met with Eugene Hutson at the bedside, and my impressions include: 49 y.o. male with a PMH of morbid obesity BMI 60, HFpEF (EF 63% 10/29/24), HTN, HLD, T2DM (last HgA1c 8.7% 9/26/24) COPD (wears 4LNC at baseline), and YANI who is presented to [facility] ED for further evaluation of SOB and worsening BLE edema due to acute decompensated heart failure.   We discussed heart failure in detail with his wife present on the phone via video call. Patient is determined to improve medication adherence.       Patients Cardiologist(s): None    Patients Primary Care Provider: [facility] provider    1. Medical Domain  What is the patient's most recent LVEF? 63%  HFrEF (LVEF <= 40%) Quadruple therapy recommended  HFmrEF (LVEF 41-49%) Quadruple therapy recommended  HFpEF (LVEF >= 50%) Minimum recommendations: SGLT2i and MRA  Is the patient on OP GDMT for their condition?   ARNI/ACEI/ARB: Yes Losartan 50 mg daily  BB: N/A   MRA: No   SGLT2i: No   Is the patient prescribed a diuretic? Yes  Furosemide 40 mg BID  Does this patient have an implanted device (ie cardioMEMS, ICD, CRT-D)?  Device type: NA  Could this patient have advanced heart failure (Stage D heart failure)?: No     REFERENCE: Potential markers of advanced HF   Inotrope (dobutamine or milrinone) used during this admission?   LVEF<=25%?   >=2 hospitalizations for ADHF in the last year?   Severe symptoms of HF (fatigue, dyspnea, confusion, edema) despite medical therapy?   Downtitration of GDMT as compared to home  "medications?   Discontinuation of GDMT because of hypotension or renal intolerance?   Recurrent arrhythmias (AF, VT with ICD shocks)?   Cardiac cachexia (i.e., unintentional weight loss due to HF)?   High-risk biomarker profile (e.g., hyponatremia [Na<135], very elevated BNP, worsening kidney function)   Escalating doses of diuretics or persistent edema despite escalation     2. Mind and Emotion  Does this patient have possible cognitive impairment?: No (The Mini-Cog score 0/5)  Ask the patient to memorize these 3 words: banana, sunrise, chair  Ask the patient to draw a clock with hands pointing at \"20 minutes after 8\"  Ask the patient to recall the 3 words  Score: Add number of words recalled + clock drawing (0 points for any errors, 2 points if correct)  Interpretation: A score of 0-2 suggests cognitive impairment is present, a score of 3-5 suggests cognitive impairment is absent  Does this patient have major depression?: No (PH-Q2 score 0/6)  Over the last 2 weeks: Little interest or pleasure in doing things? (Not at all +0, Several days +1, More than half the days +2, Nearly every day +3)  Over the last 2 weeks: Feeling down, depressed or hopeless? (Not at all +0, Several days +1, More than half the days +2, Nearly every day +3)  Score: Add points  Interpretation: A score of 3 or more suggests that major depression is likely.     3. Physical Function  Could this patient be frail?: No   Defined by presence of all of these: slowness, weakness, shrinking, inactivity, exhaustion  Is this patient at risk for falling?: No   Defined by having experienced a fall in the last 12 months.    4. Social Determinants of Health  Transportation deficits?: No   Lack of insurance?: No   Poor financial resources?: No   Living conditions (homelessness, unstable home)?: No   Poor family/social support?: No   Poor personal care?: No   Food insecurity?: No   Lack of HCPOA?: No    I provided the following heart failure education:  - " "Living With Heart Failure book provided.  - HF signs and symptoms, heart failure zones and when to call cardiologist.   - Controlling Heart Failure at Home: medication adherence, following up with cardiologist at least once yearly, staying healthy and active, limiting sodium and fluid intake as directed by cardiologist.  - Daily Weight Education    Assessment  Currently without cardiologist. Patient prefers to schedule at [facility].   HF team consulted and is providing GDMT recommendations.   HF medications discussed in detail with patient and wife.   Patient stated he was previously evaluated for weight loss surgery but was denied. Encouraged to revisit medical/surgical conversation with PCP as weight loss can help improve heart health.   Diet thoroughly discussed.  CardioMEMS HF System contraindicated given BMI >35 and chest circumference >165cm.  Patient denies alcohol use, tobacco use and illicit drug use.    IP Medications:  ARNi/ACEi/ARB: Losartan 50 mg daily  BB:  None  MRA: spironolactone 25 mg daily  SGLT2i: dapaglifozin 10 mg daily  Diuretic: furosemide 80mg BID IVP    Recommendations/ Plan  Discuss weight loss options with PCP.  Schedule cardiology follow-up at [facility]. Provided written reminder on my business card stapled to Living with Heart Failure book.       *Discharge Appointment Follow-up Plan:  Patient prefers to schedule cardiology follow-up at [facility]. Patient aware that he must contact F for scheduling.    Meredith \"Manny\" Venkat GARCIAN, RN  Heart Failure Clinical Nurse Navigator  [phone]           "

## 2024-12-18 NOTE — PROGRESS NOTES
Eugene Hutson is a 49 y.o. male on day 2 of admission presenting with Acute on chronic diastolic CHF (congestive heart failure).      Subjective   No event over night.     Reports no chest pain.   Sob and leg edema are improving.        Objective     Last Recorded Vitals  /67 (BP Location: Right arm, Patient Position: Sitting)   Pulse 81   Temp 36 °C (96.8 °F) (Temporal)   Resp 17   Wt (!) 226 kg (497 lb 8 oz)   SpO2 95%   Intake/Output last 3 Shifts:    Intake/Output Summary (Last 24 hours) at 12/18/2024 0743  Last data filed at 12/18/2024 0439  Gross per 24 hour   Intake 1210 ml   Output 6195 ml   Net -4985 ml       Admission Weight  Weight: (!) 220 kg (485 lb 0.2 oz) (12/15/24 2205)    Daily Weight  12/17/24 : (!) 226 kg (497 lb 8 oz)    Image Results  XR wrist left 3+ views  Narrative: Interpreted By:  Rosa Prescott,   STUDY:  Left wrist, 4 views.      INDICATION:  Signs/Symptoms:fall on floor, left wrist pain, ? fracture.      COMPARISON:  None.      ACCESSION NUMBER(S):  [id number]      ORDERING CLINICIAN:  JERONIMO RODRIGUEZ      FINDINGS:  No acute fracture or malalignment.  No significant degenerative changes.  Borderline widening of the scapholunate ligament interval.  Soft tissues are within normal limits.      Impression: 1. No acute fracture or malalignment.  2. Borderline widening of scapholunate ligament interval which may  represent age-indeterminate scapholunate ligament tear.      MACRO:  None.      Signed by: Rosa Prescott 12/17/2024 3:54 PM  Dictation workstation:   [id number]      Physical Exam  Constitutional:       Appearance: He is obese.      Comments: Looks comfortable on supplemental oxygen    HENT:      Head: Normocephalic.      Nose: Nose normal.   Eyes:      Extraocular Movements: Extraocular movements intact.      Pupils: Pupils are equal, round, and reactive to light.   Cardiovascular:      Rate and Rhythm: Normal rate and regular rhythm.      Heart sounds: Normal  heart sounds. No murmur heard.  Pulmonary:      Effort: No respiratory distress.      Breath sounds: Normal breath sounds. No wheezing.   Abdominal:      General: There is no distension.      Palpations: Abdomen is soft.      Tenderness: There is no abdominal tenderness.   Musculoskeletal:         General: No swelling. Normal range of motion.      Cervical back: Normal range of motion.      Right lower leg: Edema present.      Left lower leg: Edema present.   Skin:     General: Skin is warm.   Neurological:      General: No focal deficit present.      Mental Status: He is alert and oriented to person, place, and time. Mental status is at baseline.   Psychiatric:         Mood and Affect: Mood normal.         Relevant Results  Scheduled medications  amLODIPine, 10 mg, oral, Daily  aspirin, 81 mg, oral, Daily  atorvastatin, 20 mg, oral, Nightly  dapagliflozin propanediol, 10 mg, oral, Daily  enoxaparin, 60 mg, subcutaneous, q12h NADER  furosemide, 80 mg, intravenous, BID  gabapentin, 300 mg, oral, TID  insulin lispro, 0-10 Units, subcutaneous, TID AC  losartan, 50 mg, oral, Daily  metoprolol, 5 mg, intravenous, Once  nitroglycerin, 0.8 mg, sublingual, Once  oxygen, , inhalation, q12h  sennosides-docusate sodium, 2 tablet, oral, BID  spironolactone, 25 mg, oral, Daily      Continuous medications     PRN medications  PRN medications: acetaminophen, albuterol, dextrose, dextrose, glucagon, glucagon, ipratropium-albuteroL, melatonin    Results for orders placed or performed during the hospital encounter of 12/15/24 (from the past 24 hours)   POCT GLUCOSE   Result Value Ref Range    POCT Glucose 292 (H) 74 - 99 mg/dL   POCT GLUCOSE   Result Value Ref Range    POCT Glucose 211 (H) 74 - 99 mg/dL   POCT GLUCOSE   Result Value Ref Range    POCT Glucose 212 (H) 74 - 99 mg/dL   POCT GLUCOSE   Result Value Ref Range    POCT Glucose 315 (H) 74 - 99 mg/dL       Assessment/Plan       Eugene Hutson is a 49 y.o. male with a PMH of  morbid obesity BMI 60, HFpEF (EF 63% 10/29/24), HTN, HLD, T2DM (last HgA1c 8.7% 9/26/24) COPD (wears 4LNC at baseline), and YANI who is presented to [facility] ED for further evaluation of SOB and worsening BLE edema due to acute decompensated heart failure. Last hospitalization per chart review was 10/27/24-10/30/24 due to acute on chronic diastolic heart failure after IV diuresis,  on Lasix 40 mg PO BID.       #Acute on chronic diastolic heart failure  - BNP 21 on admit   - Echocardiogram (10/29/24): Left ventricular ejection fraction is normal, calculated by Beckford's biplane at 63%.Left ventricular diastolic filling was indeterminate.  Unable to determine right ventricular systolic function.Compared with the prior exam from 11/28/2023 both studies were technically very difficult but both showed normal LV systolic function without regional wall motion abnormalities. The remaining information gained was quite limited.  - Chest X-ray: No acute cardiopulmonary process.   - Respiratory Regimen: Duonebs Q4hr PRN, Incentive spirometry, Supplemental oxygen PRN   - Continue Lasix 80mg IV BID  - Monitor renal function and electrolytes closely while on aggressive IV diuretic therapy   - Daily Weights (standing), Strict I/Os (record results under I&O each shift)  -HF team consulted, recs appreciated,   Continuee on Spironolactone 25 mg and Farxiga 10 mg daily.   Lasix 80 mg IV BID, Add Diamox 250 mg BID x 2 doses,   - RFP and Mg2+ level ordered for AM lab draw  - 2g Na diet/ 2L fluid restriction  Nuclear stress test unable to do today due to body habitus,   NPO after midnight, for dobutamine stress ECHO,      #COPD (wears 4LNC at baseline)  #YANI  - c/w Albuterol MDI PRN and Duoneb q4h PRN   - no recent PFTs found in medical record  - maintain Pox>92%, wean as tolerated back to baseline O2  - CPAP ordered (auto-titrating)     #T2DM (last HgA1c 8.7% 9/26/24) c/b neuropathy  - accucheck ACHS  - home regimen: trulicity 1.5 mg and  Metformin 1000 mg PO BID  - holding home dose of Metformin during admit will resume on discharge  - Lispro SSI (0-10 units) ordered   - accucheck ACHS  - c/w Gabapentin 100 mg PO TID     #HTN  - last /91  - continue to monitor BP  - c/w home dose of Norvasc 10 mg PO every day and Losartan 50 mg PO every day     #HLD  - c/w Lipitor 20 mg PO at bedtime    Prophylaxis: S/c Lovenox.      Dispo: Pending improvement in fluid status and stress ECHO.     Ace Lynn MD

## 2024-12-18 NOTE — CARE PLAN
The patient's goals for the shift include To be able to have the CPAP tonight. and not to have shortness of breath.    The clinical goals for the shift include Patient will have no shortness of breath the whole shift.      Problem: Fall/Injury  Goal: Not fall by end of shift  Outcome: Progressing

## 2024-12-18 NOTE — CARE PLAN
Problem: Heart Failure  Goal: Improved gas exchange this shift  Outcome: Progressing  Flowsheets (Taken 12/18/2024 0429)  Improved gas exchange this shift: Position to promote circulation/maximize ventilation  Goal: Reduction in peripheral edema within 24 hours  Outcome: Progressing  Flowsheets (Taken 12/18/2024 0429)  Reduction in peripheral edema within 24 hours:   SCDs/elevate extremities   Monitor edema/skin/mucous membrane integrity  Goal: Report improvement of dyspnea/breathlessness this shift  Outcome: Progressing  Flowsheets (Taken 12/18/2024 0429)  Report improvement of dyspnea/breathlessness this shift:   Ambulate/OOB 3 times daily   Facilitate activity/mobility per patient tolerance/advance   Encourage activity/mobility/ROM   Incentive spirometry/deep breathing /HOB elevated elevated  Goal: Weight from fluid excess reduced over 2-3 days, then stabilize  Outcome: Progressing  Flowsheets (Taken 12/18/2024 0429)  Weight from fluid excess reduced over 2-3 days, then stabilize:   Monitor bowel elimination   Monitor serum electrolytes/replace per order   Monitor intake/output and daily weight   Med administration/monitor effect  Goal: Increase self care and/or family involvement in 24 hours  Outcome: Progressing  Flowsheets (Taken 12/18/2024 0429)  Increase self care and/or family involvement in 24 hours:   Assess for signs/symptoms of depression   Recognize current coping skills and assist to develop new coping skills   Organize tasks/allow time for rest   Therapy evaluation and treatment   The patient's goals for the shift include To be able to have the CPAP tonight. and not to have shortness of breath.    The clinical goals for the shift include Patient will have no shortness of breath the whole shift.

## 2024-12-19 ENCOUNTER — APPOINTMENT (OUTPATIENT)
Dept: CARDIOLOGY | Facility: HOSPITAL | Age: 49
End: 2024-12-19
Payer: MEDICARE

## 2024-12-19 DIAGNOSIS — I50.9 HEART FAILURE, UNSPECIFIED HF CHRONICITY, UNSPECIFIED HEART FAILURE TYPE: Primary | ICD-10-CM

## 2024-12-19 LAB
ALBUMIN SERPL BCP-MCNC: 3.8 G/DL (ref 3.4–5)
ANION GAP SERPL CALC-SCNC: 13 MMOL/L (ref 10–20)
BUN SERPL-MCNC: 25 MG/DL (ref 6–23)
CALCIUM SERPL-MCNC: 9.2 MG/DL (ref 8.6–10.6)
CHLORIDE SERPL-SCNC: 88 MMOL/L (ref 98–107)
CO2 SERPL-SCNC: 39 MMOL/L (ref 21–32)
CREAT SERPL-MCNC: 1.31 MG/DL (ref 0.5–1.3)
EGFRCR SERPLBLD CKD-EPI 2021: 67 ML/MIN/1.73M*2
ERYTHROCYTE [DISTWIDTH] IN BLOOD BY AUTOMATED COUNT: 12 % (ref 11.5–14.5)
GLUCOSE BLD MANUAL STRIP-MCNC: 195 MG/DL (ref 74–99)
GLUCOSE BLD MANUAL STRIP-MCNC: 227 MG/DL (ref 74–99)
GLUCOSE BLD MANUAL STRIP-MCNC: 289 MG/DL (ref 74–99)
GLUCOSE BLD MANUAL STRIP-MCNC: 294 MG/DL (ref 74–99)
GLUCOSE SERPL-MCNC: 195 MG/DL (ref 74–99)
HCT VFR BLD AUTO: 45.9 % (ref 41–52)
HGB BLD-MCNC: 14.8 G/DL (ref 13.5–17.5)
MAGNESIUM SERPL-MCNC: 2.15 MG/DL (ref 1.6–2.4)
MCH RBC QN AUTO: 31 PG (ref 26–34)
MCHC RBC AUTO-ENTMCNC: 32.2 G/DL (ref 32–36)
MCV RBC AUTO: 96 FL (ref 80–100)
NRBC BLD-RTO: 0 /100 WBCS (ref 0–0)
PHOSPHATE SERPL-MCNC: 6 MG/DL (ref 2.5–4.9)
PLATELET # BLD AUTO: 219 X10*3/UL (ref 150–450)
POTASSIUM SERPL-SCNC: 3.3 MMOL/L (ref 3.5–5.3)
RBC # BLD AUTO: 4.78 X10*6/UL (ref 4.5–5.9)
SODIUM SERPL-SCNC: 137 MMOL/L (ref 136–145)
WBC # BLD AUTO: 6.3 X10*3/UL (ref 4.4–11.3)

## 2024-12-19 PROCEDURE — 2500000001 HC RX 250 WO HCPCS SELF ADMINISTERED DRUGS (ALT 637 FOR MEDICARE OP): Performed by: INTERNAL MEDICINE

## 2024-12-19 PROCEDURE — 36415 COLL VENOUS BLD VENIPUNCTURE: CPT | Performed by: INTERNAL MEDICINE

## 2024-12-19 PROCEDURE — 80069 RENAL FUNCTION PANEL: CPT | Performed by: INTERNAL MEDICINE

## 2024-12-19 PROCEDURE — 2500000002 HC RX 250 W HCPCS SELF ADMINISTERED DRUGS (ALT 637 FOR MEDICARE OP, ALT 636 FOR OP/ED): Performed by: STUDENT IN AN ORGANIZED HEALTH CARE EDUCATION/TRAINING PROGRAM

## 2024-12-19 PROCEDURE — 1200000002 HC GENERAL ROOM WITH TELEMETRY DAILY

## 2024-12-19 PROCEDURE — 99232 SBSQ HOSP IP/OBS MODERATE 35: CPT | Performed by: INTERNAL MEDICINE

## 2024-12-19 PROCEDURE — 82947 ASSAY GLUCOSE BLOOD QUANT: CPT

## 2024-12-19 PROCEDURE — 93350 STRESS TTE ONLY: CPT | Performed by: STUDENT IN AN ORGANIZED HEALTH CARE EDUCATION/TRAINING PROGRAM

## 2024-12-19 PROCEDURE — 93016 CV STRESS TEST SUPVJ ONLY: CPT | Performed by: STUDENT IN AN ORGANIZED HEALTH CARE EDUCATION/TRAINING PROGRAM

## 2024-12-19 PROCEDURE — 2500000002 HC RX 250 W HCPCS SELF ADMINISTERED DRUGS (ALT 637 FOR MEDICARE OP, ALT 636 FOR OP/ED): Performed by: NURSE PRACTITIONER

## 2024-12-19 PROCEDURE — 83735 ASSAY OF MAGNESIUM: CPT | Performed by: INTERNAL MEDICINE

## 2024-12-19 PROCEDURE — 2500000002 HC RX 250 W HCPCS SELF ADMINISTERED DRUGS (ALT 637 FOR MEDICARE OP, ALT 636 FOR OP/ED): Performed by: INTERNAL MEDICINE

## 2024-12-19 PROCEDURE — 2500000005 HC RX 250 GENERAL PHARMACY W/O HCPCS: Performed by: NURSE PRACTITIONER

## 2024-12-19 PROCEDURE — 93017 CV STRESS TEST TRACING ONLY: CPT

## 2024-12-19 PROCEDURE — 93018 CV STRESS TEST I&R ONLY: CPT | Performed by: STUDENT IN AN ORGANIZED HEALTH CARE EDUCATION/TRAINING PROGRAM

## 2024-12-19 PROCEDURE — 2500000001 HC RX 250 WO HCPCS SELF ADMINISTERED DRUGS (ALT 637 FOR MEDICARE OP): Performed by: NURSE PRACTITIONER

## 2024-12-19 PROCEDURE — 85027 COMPLETE CBC AUTOMATED: CPT | Performed by: INTERNAL MEDICINE

## 2024-12-19 PROCEDURE — 2500000004 HC RX 250 GENERAL PHARMACY W/ HCPCS (ALT 636 FOR OP/ED): Performed by: NURSE PRACTITIONER

## 2024-12-19 RX ORDER — POTASSIUM CHLORIDE 20 MEQ/1
40 TABLET, EXTENDED RELEASE ORAL ONCE
Status: COMPLETED | OUTPATIENT
Start: 2024-12-19 | End: 2024-12-19

## 2024-12-19 RX ORDER — INSULIN LISPRO 100 [IU]/ML
3 INJECTION, SOLUTION INTRAVENOUS; SUBCUTANEOUS ONCE
Status: COMPLETED | OUTPATIENT
Start: 2024-12-19 | End: 2024-12-19

## 2024-12-19 ASSESSMENT — PAIN SCALES - GENERAL
PAINLEVEL_OUTOF10: 0 - NO PAIN
PAINLEVEL_OUTOF10: 0 - NO PAIN

## 2024-12-19 NOTE — PROGRESS NOTES
Eugene Hutson is a 49 y.o. male on day 3 of admission presenting with Acute on chronic diastolic CHF (congestive heart failure).      Subjective   No event over night.     Reports no chest pain.   Sob and leg edema are improving.        Objective     Last Recorded Vitals  /76 (BP Location: Right arm, Patient Position: Sitting)   Pulse 96   Temp 36.3 °C (97.3 °F) (Temporal)   Resp 16   Wt (!) 219 kg (483 lb)   SpO2 90%   Intake/Output last 3 Shifts:    Intake/Output Summary (Last 24 hours) at 12/19/2024 1640  Last data filed at 12/19/2024 0900  Gross per 24 hour   Intake 530 ml   Output 3350 ml   Net -2820 ml       Admission Weight  Weight: (!) 220 kg (485 lb 0.2 oz) (12/15/24 2205)    Daily Weight  12/19/24 : (!) 219 kg (483 lb)    Image Results  XR wrist left 3+ views  Narrative: Interpreted By:  Rosa Prescott,   STUDY:  Left wrist, 4 views.      INDICATION:  Signs/Symptoms:fall on floor, left wrist pain, ? fracture.      COMPARISON:  None.      ACCESSION NUMBER(S):  [id number]      ORDERING CLINICIAN:  JERONIMO RODRIGUEZ      FINDINGS:  No acute fracture or malalignment.  No significant degenerative changes.  Borderline widening of the scapholunate ligament interval.  Soft tissues are within normal limits.      Impression: 1. No acute fracture or malalignment.  2. Borderline widening of scapholunate ligament interval which may  represent age-indeterminate scapholunate ligament tear.      MACRO:  None.      Signed by: Rosa Prescott 12/17/2024 3:54 PM  Dictation workstation:   [id number]      Physical Exam  Constitutional:       Appearance: He is obese.      Comments: Looks comfortable on supplemental oxygen    HENT:      Head: Normocephalic.      Nose: Nose normal.   Eyes:      Extraocular Movements: Extraocular movements intact.      Pupils: Pupils are equal, round, and reactive to light.   Cardiovascular:      Rate and Rhythm: Normal rate and regular rhythm.      Heart sounds: Normal heart  sounds. No murmur heard.  Pulmonary:      Effort: No respiratory distress.      Breath sounds: Normal breath sounds. No wheezing.   Abdominal:      General: There is no distension.      Palpations: Abdomen is soft.      Tenderness: There is no abdominal tenderness.   Musculoskeletal:         General: No swelling. Normal range of motion.      Cervical back: Normal range of motion.      Right lower leg: Edema present.      Left lower leg: Edema present.   Skin:     General: Skin is warm.   Neurological:      General: No focal deficit present.      Mental Status: He is alert and oriented to person, place, and time. Mental status is at baseline.   Psychiatric:         Mood and Affect: Mood normal.         Relevant Results  Scheduled medications  acetaZOLAMIDE, 250 mg, oral, BID  amLODIPine, 10 mg, oral, Daily  aspirin, 81 mg, oral, Daily  atorvastatin, 20 mg, oral, Nightly  dapagliflozin propanediol, 10 mg, oral, Daily  enoxaparin, 60 mg, subcutaneous, q12h NADER  furosemide, 80 mg, intravenous, BID  gabapentin, 300 mg, oral, TID  insulin lispro, 0-10 Units, subcutaneous, TID AC  losartan, 50 mg, oral, Daily  oxygen, , inhalation, q12h  potassium chloride CR, 40 mEq, oral, Daily  sennosides-docusate sodium, 2 tablet, oral, BID  spironolactone, 25 mg, oral, Daily      Continuous medications     PRN medications  PRN medications: acetaminophen, albuterol, dextrose, dextrose, glucagon, glucagon, ipratropium-albuteroL, melatonin    Results for orders placed or performed during the hospital encounter of 12/15/24 (from the past 24 hours)   POCT GLUCOSE   Result Value Ref Range    POCT Glucose 271 (H) 74 - 99 mg/dL   POCT GLUCOSE   Result Value Ref Range    POCT Glucose 260 (H) 74 - 99 mg/dL   CBC   Result Value Ref Range    WBC 6.3 4.4 - 11.3 x10*3/uL    nRBC 0.0 0.0 - 0.0 /100 WBCs    RBC 4.78 4.50 - 5.90 x10*6/uL    Hemoglobin 14.8 13.5 - 17.5 g/dL    Hematocrit 45.9 41.0 - 52.0 %    MCV 96 80 - 100 fL    MCH 31.0 26.0 - 34.0  pg    MCHC 32.2 32.0 - 36.0 g/dL    RDW 12.0 11.5 - 14.5 %    Platelets 219 150 - 450 x10*3/uL   Magnesium   Result Value Ref Range    Magnesium 2.15 1.60 - 2.40 mg/dL   Renal Function Panel   Result Value Ref Range    Glucose 195 (H) 74 - 99 mg/dL    Sodium 137 136 - 145 mmol/L    Potassium 3.3 (L) 3.5 - 5.3 mmol/L    Chloride 88 (L) 98 - 107 mmol/L    Bicarbonate 39 (H) 21 - 32 mmol/L    Anion Gap 13 10 - 20 mmol/L    Urea Nitrogen 25 (H) 6 - 23 mg/dL    Creatinine 1.31 (H) 0.50 - 1.30 mg/dL    eGFR 67 >60 mL/min/1.73m*2    Calcium 9.2 8.6 - 10.6 mg/dL    Phosphorus 6.0 (H) 2.5 - 4.9 mg/dL    Albumin 3.8 3.4 - 5.0 g/dL   POCT GLUCOSE   Result Value Ref Range    POCT Glucose 227 (H) 74 - 99 mg/dL   POCT GLUCOSE   Result Value Ref Range    POCT Glucose 195 (H) 74 - 99 mg/dL   Echocardiogram Stress Test   Result Value Ref Range    BSA 3.43 m2       Assessment/Plan       Eugene Hutson is a 49 y.o. male with a PMH of morbid obesity BMI 60, HFpEF (EF 63% 10/29/24), HTN, HLD, T2DM (last HgA1c 8.7% 9/26/24) COPD (wears 4LNC at baseline), and YANI who is presented to [facility] ED for further evaluation of SOB and worsening BLE edema due to acute decompensated heart failure. Last hospitalization per chart review was 10/27/24-10/30/24 due to acute on chronic diastolic heart failure after IV diuresis,  on Lasix 40 mg PO BID.       #Acute on chronic diastolic heart failure  - BNP 21 on admit   - Echocardiogram (10/29/24): Left ventricular ejection fraction is normal, calculated by Beckford's biplane at 63%.Left ventricular diastolic filling was indeterminate.  Unable to determine right ventricular systolic function.Compared with the prior exam from 11/28/2023 both studies were technically very difficult but both showed normal LV systolic function without regional wall motion abnormalities. The remaining information gained was quite limited.  - Chest X-ray: No acute cardiopulmonary process.   - Respiratory Regimen: Duonebs Q4hr  PRN, Incentive spirometry, Supplemental oxygen PRN   - Continue Lasix 80mg IV BID  - Monitor renal function and electrolytes closely while on aggressive IV diuretic therapy   - Daily Weights (standing), Strict I/Os (record results under I&O each shift)  -HF team consulted, recs appreciated,   Continuee on Spironolactone 25 mg and Farxiga 10 mg daily.   Lasix 80 mg IV BID, Add Diamox 250 mg BID x 2 doses,   - RFP and Mg2+ level ordered for AM lab draw  - 2g Na diet/ 2L fluid restriction  Nuclear stress test unable to do today due to body habitus,   He had dobutamine stress ECHO, follow up results     #COPD (wears 4LNC at baseline)  #YANI  - c/w Albuterol MDI PRN and Duoneb q4h PRN   - no recent PFTs found in medical record  - maintain Pox>92%, wean as tolerated back to baseline O2  - CPAP ordered (auto-titrating)     #T2DM (last HgA1c 8.7% 9/26/24) c/b neuropathy  - accucheck ACHS  - home regimen: trulicity 1.5 mg and Metformin 1000 mg PO BID  - holding home dose of Metformin during admit will resume on discharge  - Lispro SSI (0-10 units) ordered   - accucheck ACHS  - c/w Gabapentin 100 mg PO TID     #HTN  - last /91  - continue to monitor BP  - c/w home dose of Norvasc 10 mg PO every day and Losartan 50 mg PO every day     #HLD  - c/w Lipitor 20 mg PO at bedtime    Prophylaxis: S/c Lovenox.      Dispo: Pending improvement in fluid status.     Ace Lynn MD

## 2024-12-19 NOTE — CARE PLAN
Problem: Fall/Injury  Goal: Not fall by end of shift  Outcome: Progressing  Goal: Be free from injury by end of the shift  Outcome: Progressing     Problem: Heart Failure  Goal: Improved gas exchange this shift  Outcome: Progressing  Goal: Improved urinary output this shift  Outcome: Progressing     Problem: Pain  Goal: Takes deep breaths with improved pain control throughout the shift  Outcome: Progressing   The patient's goals for the shift include To be able to have the CPAP tonight. and not to have shortness of breath.    The clinical goals for the shift include Pt will have no SOB and increased urine output throughout this shift.

## 2024-12-19 NOTE — PROGRESS NOTES
12/19/24 1219 Transitional Care Coordinator Notes:    Transitional Care Coordination Progress Note:  Patient discussed during interdisciplinary rounds.   Team members present: MD and TCC  Plan per Medical/Surgical team: Patient will be scheduled for a nuclear stress test today. Anticipating discharge in 1-2 days.  Payor: [employer]  Discharge disposition: home  Potential Barriers: none  ADOD: 1-2 days                     Assessment/Plan   Assessment & Plan  Acute on chronic diastolic CHF (congestive heart failure)               Shreya Monet RN

## 2024-12-19 NOTE — CARE PLAN
The patient's goals for the shift include To be able to have the CPAP tonight. and not to have shortness of breath.    The clinical goals for the shift include patient will have no sob this entire shift    Over the shift, the patient did not make progress toward the following goals. Barriers to progression include CHF/fluid overload. Recommendations to address these barriers include lasix.

## 2024-12-19 NOTE — NURSING NOTE
This nurse gave pt education for an elevated BS, but pt was continuing to eat snacks. Pt stated he was hungry and would try to eat just one late night snack (turkey sandwich), but was requesting cookies. Pt stopped eating and drinking at midnight due to a stress test due this morning. Call light within reach.

## 2024-12-20 VITALS
WEIGHT: 315 LBS | RESPIRATION RATE: 18 BRPM | HEIGHT: 76 IN | OXYGEN SATURATION: 91 % | DIASTOLIC BLOOD PRESSURE: 67 MMHG | HEART RATE: 84 BPM | BODY MASS INDEX: 38.36 KG/M2 | SYSTOLIC BLOOD PRESSURE: 113 MMHG | TEMPERATURE: 97.3 F

## 2024-12-20 LAB
ALBUMIN SERPL BCP-MCNC: 3.5 G/DL (ref 3.4–5)
ANION GAP SERPL CALC-SCNC: 13 MMOL/L (ref 10–20)
BUN SERPL-MCNC: 26 MG/DL (ref 6–23)
CALCIUM SERPL-MCNC: 9 MG/DL (ref 8.6–10.6)
CHLORIDE SERPL-SCNC: 92 MMOL/L (ref 98–107)
CO2 SERPL-SCNC: 36 MMOL/L (ref 21–32)
CREAT SERPL-MCNC: 1.23 MG/DL (ref 0.5–1.3)
EGFRCR SERPLBLD CKD-EPI 2021: 72 ML/MIN/1.73M*2
ERYTHROCYTE [DISTWIDTH] IN BLOOD BY AUTOMATED COUNT: 12.2 % (ref 11.5–14.5)
GLUCOSE BLD MANUAL STRIP-MCNC: 323 MG/DL (ref 74–99)
GLUCOSE BLD MANUAL STRIP-MCNC: 344 MG/DL (ref 74–99)
GLUCOSE SERPL-MCNC: 321 MG/DL (ref 74–99)
HCT VFR BLD AUTO: 46.8 % (ref 41–52)
HGB BLD-MCNC: 14.6 G/DL (ref 13.5–17.5)
MAGNESIUM SERPL-MCNC: 2.23 MG/DL (ref 1.6–2.4)
MCH RBC QN AUTO: 30.4 PG (ref 26–34)
MCHC RBC AUTO-ENTMCNC: 31.2 G/DL (ref 32–36)
MCV RBC AUTO: 97 FL (ref 80–100)
NRBC BLD-RTO: 0 /100 WBCS (ref 0–0)
PHOSPHATE SERPL-MCNC: 4.4 MG/DL (ref 2.5–4.9)
PLATELET # BLD AUTO: 203 X10*3/UL (ref 150–450)
POTASSIUM SERPL-SCNC: 3.7 MMOL/L (ref 3.5–5.3)
RBC # BLD AUTO: 4.81 X10*6/UL (ref 4.5–5.9)
SODIUM SERPL-SCNC: 137 MMOL/L (ref 136–145)
WBC # BLD AUTO: 5.8 X10*3/UL (ref 4.4–11.3)

## 2024-12-20 PROCEDURE — 99239 HOSP IP/OBS DSCHRG MGMT >30: CPT | Performed by: INTERNAL MEDICINE

## 2024-12-20 PROCEDURE — 80069 RENAL FUNCTION PANEL: CPT | Performed by: INTERNAL MEDICINE

## 2024-12-20 PROCEDURE — 82947 ASSAY GLUCOSE BLOOD QUANT: CPT

## 2024-12-20 PROCEDURE — 94660 CPAP INITIATION&MGMT: CPT

## 2024-12-20 PROCEDURE — 2500000004 HC RX 250 GENERAL PHARMACY W/ HCPCS (ALT 636 FOR OP/ED): Performed by: NURSE PRACTITIONER

## 2024-12-20 PROCEDURE — 2500000002 HC RX 250 W HCPCS SELF ADMINISTERED DRUGS (ALT 637 FOR MEDICARE OP, ALT 636 FOR OP/ED): Performed by: INTERNAL MEDICINE

## 2024-12-20 PROCEDURE — 83735 ASSAY OF MAGNESIUM: CPT | Performed by: INTERNAL MEDICINE

## 2024-12-20 PROCEDURE — 85027 COMPLETE CBC AUTOMATED: CPT | Performed by: INTERNAL MEDICINE

## 2024-12-20 PROCEDURE — 36415 COLL VENOUS BLD VENIPUNCTURE: CPT | Performed by: INTERNAL MEDICINE

## 2024-12-20 PROCEDURE — 2500000001 HC RX 250 WO HCPCS SELF ADMINISTERED DRUGS (ALT 637 FOR MEDICARE OP): Performed by: INTERNAL MEDICINE

## 2024-12-20 PROCEDURE — 2500000001 HC RX 250 WO HCPCS SELF ADMINISTERED DRUGS (ALT 637 FOR MEDICARE OP): Performed by: NURSE PRACTITIONER

## 2024-12-20 PROCEDURE — 2500000002 HC RX 250 W HCPCS SELF ADMINISTERED DRUGS (ALT 637 FOR MEDICARE OP, ALT 636 FOR OP/ED): Performed by: NURSE PRACTITIONER

## 2024-12-20 RX ORDER — ATORVASTATIN CALCIUM 20 MG/1
20 TABLET, FILM COATED ORAL DAILY
Qty: 30 TABLET | Refills: 2 | Status: SHIPPED | OUTPATIENT
Start: 2024-12-20 | End: 2025-01-19

## 2024-12-20 RX ORDER — DAPAGLIFLOZIN 10 MG/1
10 TABLET, FILM COATED ORAL DAILY
Qty: 30 TABLET | Refills: 2 | Status: SHIPPED | OUTPATIENT
Start: 2024-12-20 | End: 2025-01-19

## 2024-12-20 RX ORDER — GABAPENTIN 100 MG/1
200 CAPSULE ORAL 2 TIMES DAILY
Qty: 120 CAPSULE | Refills: 2 | Status: SHIPPED | OUTPATIENT
Start: 2024-12-20 | End: 2025-01-19

## 2024-12-20 RX ORDER — TORSEMIDE 20 MG/1
20 TABLET ORAL 2 TIMES DAILY
Qty: 60 TABLET | Refills: 2 | Status: SHIPPED | OUTPATIENT
Start: 2024-12-20 | End: 2025-01-19

## 2024-12-20 RX ORDER — POTASSIUM CHLORIDE 20 MEQ/1
40 TABLET, EXTENDED RELEASE ORAL ONCE
Status: COMPLETED | OUTPATIENT
Start: 2024-12-20 | End: 2024-12-20

## 2024-12-20 RX ORDER — AMOXICILLIN 250 MG
2 CAPSULE ORAL DAILY
Qty: 60 TABLET | Refills: 2 | Status: SHIPPED | OUTPATIENT
Start: 2024-12-20 | End: 2025-01-19

## 2024-12-20 RX ORDER — SPIRONOLACTONE 25 MG/1
25 TABLET ORAL DAILY
Qty: 30 TABLET | Refills: 2 | Status: SHIPPED | OUTPATIENT
Start: 2024-12-21 | End: 2025-01-20

## 2024-12-20 RX ORDER — METFORMIN HYDROCHLORIDE 1000 MG/1
1000 TABLET ORAL
Qty: 60 TABLET | Refills: 2 | Status: SHIPPED | OUTPATIENT
Start: 2024-12-20 | End: 2025-01-19

## 2024-12-20 RX ORDER — ASPIRIN 81 MG/1
81 TABLET ORAL
Qty: 30 TABLET | Refills: 0 | Status: SHIPPED | OUTPATIENT
Start: 2024-12-20 | End: 2025-01-19

## 2024-12-20 RX ORDER — DULAGLUTIDE 1.5 MG/.5ML
1.5 INJECTION, SOLUTION SUBCUTANEOUS WEEKLY
Qty: 4 EACH | Refills: 2 | Status: SHIPPED | OUTPATIENT
Start: 2024-12-20 | End: 2025-01-19

## 2024-12-20 RX ORDER — LOSARTAN POTASSIUM 25 MG/1
25 TABLET ORAL DAILY
Qty: 30 TABLET | Refills: 2 | Status: SHIPPED | OUTPATIENT
Start: 2024-12-20 | End: 2025-01-19

## 2024-12-20 RX ORDER — ALBUTEROL SULFATE 90 UG/1
2 INHALANT RESPIRATORY (INHALATION) EVERY 4 HOURS PRN
Qty: 18 G | Refills: 2 | Status: SHIPPED | OUTPATIENT
Start: 2024-12-20

## 2024-12-20 ASSESSMENT — COGNITIVE AND FUNCTIONAL STATUS - GENERAL: MOBILITY SCORE: 24

## 2024-12-20 ASSESSMENT — PAIN SCALES - GENERAL: PAINLEVEL_OUTOF10: 0 - NO PAIN

## 2024-12-20 NOTE — DISCHARGE SUMMARY
Discharge Diagnosis  Acute on chronic diastolic CHF (congestive heart failure)    Issues Requiring Follow-Up  PCP follow up for health maintenance.     Discharge Meds     Medication List      START taking these medications     dapagliflozin propanediol 10 mg; Commonly known as: Farxiga; Take 1   tablet (10 mg) by mouth once daily.   spironolactone 25 mg tablet; Commonly known as: Aldactone; Take 1 tablet   (25 mg) by mouth once daily.; Start taking on: December 21, 2024   torsemide 20 mg tablet; Commonly known as: Demadex; Take 1 tablet (20   mg) by mouth 2 times a day.     CHANGE how you take these medications     gabapentin 100 mg capsule; Commonly known as: Neurontin; Take 2 capsules   (200 mg) by mouth 2 times a day.; What changed: how much to take, when to   take this   losartan 25 mg tablet; Commonly known as: Cozaar; Take 1 tablet (25 mg)   by mouth once daily.; What changed: medication strength, how much to take     CONTINUE taking these medications     albuterol 90 mcg/actuation inhaler; Inhale 2 puffs every 4 hours if   needed for wheezing or shortness of breath.   aspirin 81 mg EC tablet; Take 1 tablet (81 mg) by mouth once daily.   atorvastatin 20 mg tablet; Commonly known as: Lipitor; Take 1 tablet (20   mg) by mouth once daily.   diclofenac sodium 1 % gel; Commonly known as: Voltaren   metFORMIN 1,000 mg tablet; Commonly known as: Glucophage; Take 1 tablet   (1,000 mg) by mouth 2 times daily (morning and late afternoon).   sennosides-docusate sodium 8.6-50 mg tablet; Commonly known as:   Rosalba-Colace; Take 2 tablets by mouth once daily.   Trulicity 1.5 mg/0.5 mL pen injector injection; Generic drug:   dulaglutide; Inject 1.5 mg under the skin 1 (one) time per week. On Monday     STOP taking these medications     amLODIPine 10 mg tablet; Commonly known as: Norvasc   furosemide 40 mg tablet; Commonly known as: Lasix   oxygen gas therapy; Commonly known as: O2       Test Results Pending At  Discharge  Pending Labs       No current pending labs.            Hospital Course    Eugene Hutson is a 49 y.o. male with a PMH of morbid obesity BMI 60, HFpEF (EF 63% 10/29/24), HTN, HLD, T2DM (last HgA1c 8.7% 9/26/24) and YANI who was presented to [facility] ED for further evaluation of SOB and worsening BLE edema due to acute decompensated diastolic heart failure. Last hospitalization per chart review was 10/27/24-10/30/24 due to acute on chronic diastolic heart failure. During the stay he was treated with iv lasix 80 mg BID with diuresis.   His breathing was improved. He had a dobutamine stress ECHO with sub optimal HR of 96 with no ischemia at that HR.   HF recommended adding Spironolactone 25 mg daily and Farxiga 10 mg daily to his home regimen. Advised to take torsemide 20 mg BID instead of oral lasix.   Advised follow up with PCP for Monitoring of electrolytes while on diuretics and follow up with Cardiologist at [facility].     Issues addressed during the stay:      #Acute on chronic diastolic heart failure  - BNP 21 on admit   - Echocardiogram (10/29/24): Left ventricular ejection fraction is normal, calculated by Beckford's biplane at 63%.Left ventricular diastolic filling was indeterminate.  Unable to determine right ventricular systolic function.Compared with the prior exam from 11/28/2023 both studies were technically very difficult but both showed normal LV systolic function without regional wall motion abnormalities. The remaining information gained was quite limited.  - Chest X-ray: No acute cardiopulmonary process.   Rx with Iv Lasix 80mg IV BID  -HF team consulted, recs appreciated,   Continuee on Spironolactone 25 mg and Farxiga 10 mg daily.   Dobutamine stress ECHO on 12/19 was sub optimal study.   Advised Torsemide 20 mg BID,   Advised follow up with Cardiology at [facility].   Advised to monitor electrolytes with PCP. ( He reported next appointment on Tuesday 24th with PCP)  Discharge day: O2 sats 94% on RA, and  weight 480 lbs.      #H/o YANI  - no recent PFTs found in medical record  Continue CPAP at home.      #T2DM (last HgA1c 8.7% 9/26/24) c/b neuropathy  Continue home regimen: trulicity 1.5 mg and Metformin 1000 mg PO BID  Advised Gabapentin 200 mg BID.      #H/o HTN  Amlodipine discontinued due to low BP.   Advised to reduce dose of Losartan to 25 mg daily.   On Farxiga which can lower BP,   Monitor BP with PCP.      #HLD  - c/w Lipitor 20 mg PO at bedtime    Discharged home in a stable condition.  Discharge day management time > 30 minutes.          Pertinent Physical Exam At Time of Discharge:  Vitals:    12/20/24 1219   BP: 113/67   Pulse: 84   Resp: 18   Temp: 36.3 °C (97.3 °F)   SpO2: 91%       Physical Exam  Constitutional:       Appearance: Normal appearance. He is obese.   HENT:      Head: Normocephalic.      Nose: Nose normal.   Eyes:      Extraocular Movements: Extraocular movements intact.      Pupils: Pupils are equal, round, and reactive to light.   Cardiovascular:      Rate and Rhythm: Normal rate and regular rhythm.      Heart sounds: Normal heart sounds. No murmur heard.  Pulmonary:      Effort: Pulmonary effort is normal. No respiratory distress.      Breath sounds: Normal breath sounds. No wheezing.      Comments: Resting oxygen sats 94% on RA, walking no drop in sats.   Abdominal:      General: Bowel sounds are normal. There is no distension.      Palpations: Abdomen is soft.      Tenderness: There is no abdominal tenderness.   Musculoskeletal:         General: Normal range of motion.      Cervical back: Normal range of motion.   Skin:     General: Skin is warm.   Neurological:      General: No focal deficit present.      Mental Status: He is alert and oriented to person, place, and time.   Psychiatric:         Mood and Affect: Mood normal.         Outpatient Follow-Up  No future appointments.      Ace Lynn MD

## 2024-12-20 NOTE — NURSING NOTE
Patient discharged to home today. He verbalized an understanding of the AVS instructions after review with this nurse. His IV was removed with tip intact. His belongings were packed. His prescriptions were sent to [facility] pharmacy. His wife transported him home.

## 2024-12-20 NOTE — CARE PLAN
The patient's goals for the shift include To be able to have the CPAP tonight. and not to have shortness of breath.    The clinical goals for the shift include patient will have decrease edema and sob this shift      Problem: Fall/Injury  Goal: Not fall by end of shift  Outcome: Progressing  Goal: Be free from injury by end of the shift  Outcome: Progressing  Goal: Verbalize understanding of personal risk factors for fall in the hospital  Outcome: Progressing  Goal: Verbalize understanding of risk factor reduction measures to prevent injury from fall in the home  Outcome: Progressing     Problem: Heart Failure  Goal: Improved gas exchange this shift  Outcome: Progressing  Goal: Improved urinary output this shift  Outcome: Progressing     Problem: Pain  Goal: Takes deep breaths with improved pain control throughout the shift  Outcome: Progressing

## 2024-12-20 NOTE — CARE PLAN
The patient's goals for the shift include To be able to have the CPAP tonight. and not to have shortness of breath.    The clinical goals for the shift include Pt will remain safe and free from injury during this shift    Over the shift, the patient did not make progress toward the following goals. Barriers to progression include   Problem: Fall/Injury  Goal: Not fall by end of shift  Outcome: Progressing  Goal: Be free from injury by end of the shift  Outcome: Progressing  Goal: Verbalize understanding of personal risk factors for fall in the hospital  Outcome: Progressing  Goal: Verbalize understanding of risk factor reduction measures to prevent injury from fall in the home  Outcome: Progressing

## 2024-12-20 NOTE — SIGNIFICANT EVENT
Rapid Response Nurse Note: RADAR alert: 6    Pager time: 22  Arrival time: 25  Event end time: 27  Location: [place] [address]  [x] Triage by phone or secure messaging    Rapid response initiated by:  [] Rapid response RN [] Family [] Nursing Supervisor [] Physician   [x] RADAR auto page [] Sepsis auto-page [] RN [] RT   [] NP/PA [] Other:     Primary reason for call:   [] BAT [] New CPAP/BiPAP [] Bleeding [] Change in mental status   [] Chest pain [] Code blue [] FiO2 >/= 50% [] HR </= 40 bpm   [] HR >/= 130 bpm [] Hyperglycemia [] Hypoglycemia [x] RADAR    [] RR </= 8 bpm [] RR >/= 30 bpm [] SBP </= 90 mmHg [] SpO2 < 90%   [] Seizure [] Sepsis [] Shortness of breath  [] Staff concern: see comments     Initial VS and/or RADAR VS: T n/a °C; HR 92; RR 12; BP 92/61; SPO2 95% on supplemental oxygen.    Interventions:  [x] None [] ABG/VBG [] Assist w/ICU transfer [] BAT paged    [] Bag mask [] Blood [] Cardioversion [] Code Blue   [] Code blue for intubation [] Code status changed [] Chest x-ray [] EKG   [] IV fluid/bolus [] KUB x-ray [] Labs/cultures [] Medication   [] Nebulizer treatment [] NIPPV (CPAP/BiPAP) [] Oxygen [] Oral airway   [] Peripheral IV [] Palliative care consult [] CT/MRI [] Sepsis protocol    [] Suctioned [] Other:     Outcome:  [] Coded and  [] Code blue for intubation [] Coded and transferred to ICU []  on division   [x] Remained on division (no change) [] Remained on division + additional monitoring [] Remained in ED [] Transferred to ED   [] Transferred to ICU [] Transferred to inpatient status [] Transferred for interventions (procedure) [] Transferred to ICU stepdown    [] Transferred to surgery [] Transferred to telemetry [] Sepsis protocol [] STEMI protocol   [] Stroke protocol [x] Bedside nurse instructed to page rapid response for any concerns or acute change in condition/VS     Additional Comments: Reviewed above VS with bedside RN via phone.  Vital signs within patient's current  trends.  Staff to page rapid response for any concerns or acute change in condition or VS.

## 2024-12-20 NOTE — PROGRESS NOTES
12/20/24 1220   Discharge Planning   Home or Post Acute Services None   Expected Discharge Disposition Home   Does the patient need discharge transport arranged? No       Per MD, patient will discharge to home today. No discharge needs stated. Spouse will provide transportation home.    Shreya Monet RN, BSN  Transitional Care Coordinator